# Patient Record
Sex: MALE | Race: WHITE | Employment: OTHER | URBAN - METROPOLITAN AREA
[De-identification: names, ages, dates, MRNs, and addresses within clinical notes are randomized per-mention and may not be internally consistent; named-entity substitution may affect disease eponyms.]

---

## 2017-02-07 ENCOUNTER — OFFICE VISIT (OUTPATIENT)
Dept: AUDIOLOGY | Facility: CLINIC | Age: 67
End: 2017-02-07

## 2017-02-07 DIAGNOSIS — H90.3 SENSORINEURAL HEARING LOSS, BILATERAL: Primary | ICD-10-CM

## 2017-02-07 PROCEDURE — 92593 HEARING AID CHECK, BOTH EARS: CPT | Performed by: AUDIOLOGIST

## 2017-02-07 NOTE — PROGRESS NOTES
HEARING AID FOLLOW-UP    Stu Vasquez  1/9/1950  [id number]        Hearing Aid Information    Hearing Aid Information       Right    Left   Make: Phonak Make: Phonak   Model: Fabiana V70P Model:  Fabiana V 70 P   Serial Number: [id number] Serial Number:

## 2017-05-03 ENCOUNTER — OFFICE VISIT (OUTPATIENT)
Dept: INTERNAL MEDICINE CLINIC | Facility: CLINIC | Age: 67
End: 2017-05-03

## 2017-05-03 VITALS
WEIGHT: 170 LBS | DIASTOLIC BLOOD PRESSURE: 88 MMHG | BODY MASS INDEX: 24 KG/M2 | HEART RATE: 63 BPM | TEMPERATURE: 98 F | SYSTOLIC BLOOD PRESSURE: 138 MMHG | OXYGEN SATURATION: 97 %

## 2017-05-03 DIAGNOSIS — E55.9 VITAMIN D DEFICIENCY: ICD-10-CM

## 2017-05-03 DIAGNOSIS — Z00.00 PHYSICAL EXAM: Primary | ICD-10-CM

## 2017-05-03 DIAGNOSIS — I10 ESSENTIAL HYPERTENSION: ICD-10-CM

## 2017-05-03 PROCEDURE — 85025 COMPLETE CBC W/AUTO DIFF WBC: CPT | Performed by: FAMILY MEDICINE

## 2017-05-03 PROCEDURE — G0438 PPPS, INITIAL VISIT: HCPCS | Performed by: FAMILY MEDICINE

## 2017-05-03 PROCEDURE — 84443 ASSAY THYROID STIM HORMONE: CPT | Performed by: FAMILY MEDICINE

## 2017-05-03 PROCEDURE — 82306 VITAMIN D 25 HYDROXY: CPT | Performed by: FAMILY MEDICINE

## 2017-05-03 PROCEDURE — 80053 COMPREHEN METABOLIC PANEL: CPT | Performed by: FAMILY MEDICINE

## 2017-05-03 PROCEDURE — 80061 LIPID PANEL: CPT | Performed by: FAMILY MEDICINE

## 2017-05-03 PROCEDURE — 82607 VITAMIN B-12: CPT | Performed by: FAMILY MEDICINE

## 2017-05-03 PROCEDURE — G0009 ADMIN PNEUMOCOCCAL VACCINE: HCPCS | Performed by: FAMILY MEDICINE

## 2017-05-03 PROCEDURE — 90670 PCV13 VACCINE IM: CPT | Performed by: FAMILY MEDICINE

## 2017-05-03 RX ORDER — LISINOPRIL 40 MG/1
40 TABLET ORAL DAILY
Qty: 90 TABLET | Refills: 2 | Status: SHIPPED | OUTPATIENT
Start: 2017-05-03 | End: 2017-12-05

## 2017-05-03 RX ORDER — CHLORTHALIDONE 25 MG/1
12.5 TABLET ORAL DAILY
Qty: 90 TABLET | Refills: 2 | Status: SHIPPED | OUTPATIENT
Start: 2017-05-03 | End: 2017-12-05

## 2017-05-03 NOTE — PROGRESS NOTES
HPI:   Gage Serrano is a 79year old male who presents for a MA Supervisit.     Past pcp was Dr Deisy Petres    Patient Active Problem List:     BPH (benign prostatic hyperplasia)     Nocturia     Colon cancer screening     Skin cancer    HTN- well contro you have 3 or more medical conditions?: 0-No    Have you fallen in the last 12 months?: 0-No    Do you accidently lose urine?: 0-No    Do you have difficulty seeing?: 0-No    Do you have any difficulty walking or getting up?: 0-No    Do you have any trippi UNITS Oral Tab Take  by mouth. Disp:  Rfl:    Vitamin B-12 (VITAMIN B12) 1000 MCG Oral Tab Take  by mouth. Disp:  Rfl:    lisinopril (PRINIVIL,ZESTRIL) 40 MG Oral Tab Take  by mouth.  Disp:  Rfl:    Magnesium Citrate Does not apply Powder MAGNESIUM CITRATE developed, well nourished, in no apparent distress  SKIN: no rashes, no suspicious lesions  HEENT: atraumatic, normocephalic, ears and throat are clear  Hearing assessed via: Questionnaire   Has hearing aids- Dr Deborah Soto  EYES: PERRLA, EOMI, conjunctiva are c Occult Blood Annually No results found for: FOB No flowsheet data found.     Glaucoma Screening      Ophthalmology Visit Annually Pt goes    Prostate Cancer Screening      PSA  Annually PSA due on 12/08/2018  Update Health Maintenance if applicable   Immuni Panel [E]; Future  - TSH W Reflex To Free T4 [E]; Future  - Vitamin D, 25-Hydroxy [E]; Future  - Vitamin B12 [E];  Future  - CBC W Differential W Platelet [E]  - Comp Metabolic Panel (14) [E]  - Lipid Panel [E]  - TSH W Reflex To Free T4 [E]  - Vitamin D, 2

## 2017-05-15 ENCOUNTER — NURSE ONLY (OUTPATIENT)
Dept: INTERNAL MEDICINE CLINIC | Facility: CLINIC | Age: 67
End: 2017-05-15

## 2017-05-15 DIAGNOSIS — Z12.5 PROSTATE CANCER SCREENING: ICD-10-CM

## 2017-05-15 DIAGNOSIS — E87.6 HYPOKALEMIA: ICD-10-CM

## 2017-05-15 DIAGNOSIS — R35.1 NOCTURIA: ICD-10-CM

## 2017-05-15 LAB
BILIRUB UR QL: NEGATIVE
CLARITY UR: CLEAR
COLOR UR: YELLOW
GLUCOSE UR-MCNC: NEGATIVE MG/DL
HGB UR QL STRIP.AUTO: NEGATIVE
KETONES UR-MCNC: NEGATIVE MG/DL
LEUKOCYTE ESTERASE UR QL STRIP.AUTO: NEGATIVE
NITRITE UR QL STRIP.AUTO: NEGATIVE
PH UR: 7 [PH] (ref 5–8)
POTASSIUM SERPL-SCNC: 3.7 MMOL/L (ref 3.3–5.1)
PROT UR-MCNC: NEGATIVE MG/DL
PSA SERPL-MCNC: 2.9 NG/ML (ref 0–4)
SP GR UR STRIP: 1.01 (ref 1–1.03)
UROBILINOGEN UR STRIP-ACNC: <2
VIT C UR-MCNC: NEGATIVE MG/DL

## 2017-05-15 PROCEDURE — 84132 ASSAY OF SERUM POTASSIUM: CPT | Performed by: FAMILY MEDICINE

## 2017-05-15 PROCEDURE — 81003 URINALYSIS AUTO W/O SCOPE: CPT | Performed by: UROLOGY

## 2017-05-17 ENCOUNTER — LAB REQUISITION (OUTPATIENT)
Dept: LAB | Facility: HOSPITAL | Age: 67
End: 2017-05-17
Payer: MEDICARE

## 2017-05-17 DIAGNOSIS — D48.5 NEOPLASM OF UNCERTAIN BEHAVIOR OF SKIN: ICD-10-CM

## 2017-05-17 DIAGNOSIS — R23.9 SKIN CHANGE: ICD-10-CM

## 2017-05-17 PROCEDURE — 88305 TISSUE EXAM BY PATHOLOGIST: CPT | Performed by: PLASTIC SURGERY

## 2017-11-02 ENCOUNTER — OFFICE VISIT (OUTPATIENT)
Dept: AUDIOLOGY | Facility: CLINIC | Age: 67
End: 2017-11-02

## 2017-11-02 DIAGNOSIS — H90.3 SENSORINEURAL HEARING LOSS, BILATERAL: Primary | ICD-10-CM

## 2017-11-02 PROCEDURE — 92593 HEARING AID CHECK, BOTH EARS: CPT | Performed by: AUDIOLOGIST

## 2017-11-02 PROCEDURE — V5266 BATTERY FOR HEARING DEVICE: HCPCS | Performed by: AUDIOLOGIST

## 2017-11-02 NOTE — PROGRESS NOTES
HEARING AID FOLLOW-UP    Bard Norton  1/9/1950  [id number]    Patient is here for annual hearing aid check.     He wears [employer]ista V70P hearing aids coupled to a slim tube and dome on the left and coupled to a custom earmold (helix lock) on the

## 2017-12-05 ENCOUNTER — OFFICE VISIT (OUTPATIENT)
Dept: INTERNAL MEDICINE CLINIC | Facility: CLINIC | Age: 67
End: 2017-12-05

## 2017-12-05 VITALS
HEART RATE: 80 BPM | SYSTOLIC BLOOD PRESSURE: 138 MMHG | DIASTOLIC BLOOD PRESSURE: 72 MMHG | TEMPERATURE: 98 F | BODY MASS INDEX: 24.21 KG/M2 | OXYGEN SATURATION: 98 % | WEIGHT: 171 LBS | HEIGHT: 70.5 IN

## 2017-12-05 DIAGNOSIS — Z00.00 HEALTHCARE MAINTENANCE: Primary | ICD-10-CM

## 2017-12-05 DIAGNOSIS — I10 ESSENTIAL HYPERTENSION: ICD-10-CM

## 2017-12-05 DIAGNOSIS — Z87.891 FORMER SMOKER: ICD-10-CM

## 2017-12-05 DIAGNOSIS — G89.29 CHRONIC BILATERAL LOW BACK PAIN WITHOUT SCIATICA: ICD-10-CM

## 2017-12-05 DIAGNOSIS — M54.50 CHRONIC BILATERAL LOW BACK PAIN WITHOUT SCIATICA: ICD-10-CM

## 2017-12-05 PROCEDURE — 99213 OFFICE O/P EST LOW 20 MIN: CPT | Performed by: FAMILY MEDICINE

## 2017-12-05 RX ORDER — KETOCONAZOLE 20 MG/ML
SHAMPOO TOPICAL
Refills: 4 | COMMUNITY
Start: 2017-11-06 | End: 2021-10-25

## 2017-12-05 RX ORDER — CHLORTHALIDONE 25 MG/1
12.5 TABLET ORAL DAILY
Qty: 90 TABLET | Refills: 2 | Status: SHIPPED | OUTPATIENT
Start: 2017-12-05 | End: 2018-12-18

## 2017-12-05 RX ORDER — LISINOPRIL 40 MG/1
40 TABLET ORAL DAILY
Qty: 90 TABLET | Refills: 2 | Status: SHIPPED | OUTPATIENT
Start: 2017-12-05 | End: 2018-12-26

## 2017-12-05 NOTE — PROGRESS NOTES
HPI:    Patient ID: Bulmaro Matthews is a 79year old male her for HTN f/u. HPI:  -doing well since the last visit here.   -compliant with hypertensive medication. No side effects of medication.   -does not monitor home BP  -No chest pain. No dyspnea. Omega-3 Fatty Acids (FISH OIL) 500 MG Oral Cap Take by mouth. Disp:  Rfl:    Glucosamine-Chondroitin 500-400 MG Oral Cap Take by mouth. Disp:  Rfl:    Cinnamon 500 MG Oral Tab Take by mouth.  Disp:  Rfl:    Lactobacillus (PROBIOTIC ACIDOPHILUS OR) Take by bilateral low back pain without sciatica  -gradually self-improving  -no signs of bone/disc etiology  -advised to restart home PT exercises which help significantly in the past  -NSAIDs 400-600mg BID x7d  -apply a heating pad to affected area to help relie

## 2017-12-20 ENCOUNTER — TELEPHONE (OUTPATIENT)
Dept: SURGERY | Facility: CLINIC | Age: 67
End: 2017-12-20

## 2017-12-20 ENCOUNTER — APPOINTMENT (OUTPATIENT)
Dept: LAB | Facility: HOSPITAL | Age: 67
End: 2017-12-20
Attending: UROLOGY
Payer: MEDICARE

## 2017-12-20 DIAGNOSIS — R35.1 NOCTURIA: ICD-10-CM

## 2017-12-20 DIAGNOSIS — Z12.5 SPECIAL SCREENING FOR MALIGNANT NEOPLASM OF PROSTATE: ICD-10-CM

## 2017-12-20 DIAGNOSIS — Z12.5 SPECIAL SCREENING FOR MALIGNANT NEOPLASM OF PROSTATE: Primary | ICD-10-CM

## 2017-12-20 PROCEDURE — 81003 URINALYSIS AUTO W/O SCOPE: CPT

## 2017-12-20 PROCEDURE — 36415 COLL VENOUS BLD VENIPUNCTURE: CPT

## 2017-12-20 NOTE — TELEPHONE ENCOUNTER
Spoke with Nia Hall who called asking for orders to be placed for pt. I reviewed RIVERA's LOV note of 12/21/16 and his AVS gives instructions for pt to return in 1 yr and complete a PSA screen and a UA. I told Nia Hall that I will place those orders now.

## 2017-12-22 ENCOUNTER — OFFICE VISIT (OUTPATIENT)
Dept: SURGERY | Facility: CLINIC | Age: 67
End: 2017-12-22

## 2017-12-22 VITALS
HEIGHT: 70.5 IN | DIASTOLIC BLOOD PRESSURE: 82 MMHG | HEART RATE: 72 BPM | WEIGHT: 170 LBS | TEMPERATURE: 98 F | BODY MASS INDEX: 24.07 KG/M2 | SYSTOLIC BLOOD PRESSURE: 120 MMHG | RESPIRATION RATE: 16 BRPM

## 2017-12-22 DIAGNOSIS — Z12.5 PROSTATE CANCER SCREENING: ICD-10-CM

## 2017-12-22 DIAGNOSIS — N40.1 BENIGN NON-NODULAR PROSTATIC HYPERPLASIA WITH LOWER URINARY TRACT SYMPTOMS: Primary | ICD-10-CM

## 2017-12-22 DIAGNOSIS — R35.1 NOCTURIA: ICD-10-CM

## 2017-12-22 PROCEDURE — G0463 HOSPITAL OUTPT CLINIC VISIT: HCPCS | Performed by: UROLOGY

## 2017-12-22 PROCEDURE — 99214 OFFICE O/P EST MOD 30 MIN: CPT | Performed by: UROLOGY

## 2017-12-22 NOTE — PATIENT INSTRUCTIONS
1.    In 3 months. Please notify us if urination problem were to noticeably worsen before then--let nurses know under such circumstances. BPH (Enlarged Prostate)  The prostate is a gland at the base of the bladder.  As some men get older, the prostate may screening  BPH does not increase the risk of prostate cancer. But because prostate cancer is a common cancer in men, screening is sometimes recommended. This may help detect the cancer in its early stages when treatment is most effective.  Factors that can

## 2017-12-22 NOTE — PROGRESS NOTES
HPI:    Patient ID: Johnna Chavarria is a 79year old male. HPI     1.  Voiding Dysfunction  Patient has current AUA score of 14, moderate voiding dysfunction category, worse compared to previous score of 10, moderate voiding dysfunction category, on 12 change. Neurological: Negative for speech difficulty. Psychiatric/Behavioral: The patient is not nervous/anxious.         HISTORY:  Past Medical History:   Diagnosis Date   • Benign prostatic hypertrophy    • Diverticulosis of large intestine    • Lump Allergies:  Merthiolate Glyceri*    Rash   PHYSICAL EXAM:   Physical Exam   Constitutional: He is oriented to person, place, and time. He appears well-developed and well-nourished. No distress. HENT:   Head: Normocephalic and atraumatic.    Eyes: EOM undecided as to when he would like to have iot performed (because of family situation) and would like to follow up in 3 months for re-evaluation.    (R35.1) Nocturia  Patient has current AUA score of 14, moderate voiding dysfunction category.  Patient feels

## 2018-01-03 ENCOUNTER — AUDIOLOGY DOCUMENTATION (OUTPATIENT)
Dept: AUDIOLOGY | Facility: CLINIC | Age: 68
End: 2018-01-03

## 2018-01-03 NOTE — TELEPHONE ENCOUNTER
Patient presented at       Hearing Aid Information       Right    Left   Make: Phonak Make: Phonak   Model: Bolero V70P Model:  Bolero V 70 P   Serial Number: [id number] Serial Number: [id number]   Date of Purchase: 10/04/16 Date of Purchase: 10/04/

## 2018-01-17 ENCOUNTER — HOSPITAL ENCOUNTER (OUTPATIENT)
Dept: ULTRASOUND IMAGING | Facility: HOSPITAL | Age: 68
Discharge: HOME | End: 2018-01-17
Attending: FAMILY MEDICINE
Payer: MEDICARE

## 2018-01-17 DIAGNOSIS — Z87.891 FORMER SMOKER: ICD-10-CM

## 2018-01-17 PROCEDURE — 76706 US ABDL AORTA SCREEN AAA: CPT | Performed by: FAMILY MEDICINE

## 2018-02-09 ENCOUNTER — TELEPHONE (OUTPATIENT)
Dept: SURGERY | Facility: CLINIC | Age: 68
End: 2018-02-09

## 2018-02-09 NOTE — TELEPHONE ENCOUNTER
YUK gave written instructions to move this pt to 8am on tues 2/13. I called pt and asked if he could come in at 8am and he agreed and I changed the appt on the schd.

## 2018-02-13 ENCOUNTER — OFFICE VISIT (OUTPATIENT)
Dept: SURGERY | Facility: CLINIC | Age: 68
End: 2018-02-13

## 2018-02-13 VITALS
HEART RATE: 61 BPM | BODY MASS INDEX: 23.62 KG/M2 | SYSTOLIC BLOOD PRESSURE: 136 MMHG | WEIGHT: 165 LBS | HEIGHT: 70 IN | TEMPERATURE: 98 F | RESPIRATION RATE: 16 BRPM | DIASTOLIC BLOOD PRESSURE: 80 MMHG

## 2018-02-13 DIAGNOSIS — R35.1 NOCTURIA: ICD-10-CM

## 2018-02-13 DIAGNOSIS — N40.1 BENIGN NON-NODULAR PROSTATIC HYPERPLASIA WITH LOWER URINARY TRACT SYMPTOMS: Primary | ICD-10-CM

## 2018-02-13 DIAGNOSIS — Z12.5 PROSTATE CANCER SCREENING: ICD-10-CM

## 2018-02-13 PROCEDURE — G0463 HOSPITAL OUTPT CLINIC VISIT: HCPCS | Performed by: UROLOGY

## 2018-02-13 PROCEDURE — 99213 OFFICE O/P EST LOW 20 MIN: CPT | Performed by: UROLOGY

## 2018-02-13 RX ORDER — DIAZEPAM 10 MG/1
TABLET ORAL
Qty: 1 TABLET | Refills: 0 | Status: SHIPPED | OUTPATIENT
Start: 2018-02-13 | End: 2018-03-27 | Stop reason: ALTCHOICE

## 2018-02-13 NOTE — PROGRESS NOTES
HPI:    Patient ID: Pj Boyd is a 76year old male. HPI    1.  Voiding Dysfunction  Patient has current AUA score of 11, moderate voiding dysfunction category, better compared to previous score of 14, moderate voiding dysfunction category, on 12 Gastrointestinal: Negative for abdominal pain and constipation. Genitourinary: Negative for dysuria, flank pain and hematuria (gross).         Patient complains of sensation of not emptying bladder, urinary frequency less then 2 hours, intermittent stre Take by mouth. Disp:  Rfl:    Lactobacillus (PROBIOTIC ACIDOPHILUS OR) Take by mouth. Disp:  Rfl:    aspirin (ASPIR-81) 81 MG Oral Tab EC Take  by mouth. Disp:  Rfl:    Cholecalciferol (D 5000) 5000 UNITS Oral Tab Take  by mouth.  Disp:  Rfl:    Vitamin B-1 hyperplasia with lower urinary tract symptoms  (primary encounter diagnosis)  Prostate exam not performed today, however on 12/22/2017 per chart review, Prostate 50 g; 4+ enlarged, base able to be palpated, no palpable nodules or indurations.  I fully expla anatomy of the prostate and also of the bladder in preparation for likely greenlight laser ablation of the prostate. 3.  Please stop fish oil and baby aspirin (81 mg) at least 7 days before procedure    4.   Diazepam/Valium 10 mg tablet upon arrival at o

## 2018-02-13 NOTE — PATIENT INSTRUCTIONS
1.  Transrectal ultrasound of the prostate--no biopsy; my nurses will help/advise you in scheduling and at the hospital; purposes to measure more precisely volume of the enlarged prostate.       2.  Cystoscopy, possible biopsy--primary is to investigate int

## 2018-02-16 ENCOUNTER — TELEPHONE (OUTPATIENT)
Dept: SURGERY | Facility: CLINIC | Age: 68
End: 2018-02-16

## 2018-02-21 ENCOUNTER — HOSPITAL ENCOUNTER (OUTPATIENT)
Dept: ULTRASOUND IMAGING | Facility: HOSPITAL | Age: 68
Discharge: HOME | End: 2018-02-21
Attending: UROLOGY
Payer: MEDICARE

## 2018-02-21 DIAGNOSIS — R35.1 NOCTURIA: ICD-10-CM

## 2018-02-21 DIAGNOSIS — N40.1 BENIGN NON-NODULAR PROSTATIC HYPERPLASIA WITH LOWER URINARY TRACT SYMPTOMS: ICD-10-CM

## 2018-02-21 PROCEDURE — 76872 US TRANSRECTAL: CPT | Performed by: UROLOGY

## 2018-03-05 NOTE — TELEPHONE ENCOUNTER
Referral not needed Outpatient scheduling was calling to have the DX to be changed to get test covered by insurance.  Printed order and given to [employer]

## 2018-03-19 ENCOUNTER — OFFICE VISIT (OUTPATIENT)
Dept: SURGERY | Facility: CLINIC | Age: 68
End: 2018-03-19

## 2018-03-19 VITALS
DIASTOLIC BLOOD PRESSURE: 78 MMHG | TEMPERATURE: 98 F | WEIGHT: 165 LBS | HEART RATE: 72 BPM | RESPIRATION RATE: 16 BRPM | HEIGHT: 70.5 IN | BODY MASS INDEX: 23.36 KG/M2 | SYSTOLIC BLOOD PRESSURE: 122 MMHG

## 2018-03-19 DIAGNOSIS — R35.1 NOCTURIA: ICD-10-CM

## 2018-03-19 DIAGNOSIS — N40.1 BENIGN NON-NODULAR PROSTATIC HYPERPLASIA WITH LOWER URINARY TRACT SYMPTOMS: Primary | ICD-10-CM

## 2018-03-19 DIAGNOSIS — N21.0 BLADDER STONES: ICD-10-CM

## 2018-03-19 PROCEDURE — 99213 OFFICE O/P EST LOW 20 MIN: CPT | Performed by: UROLOGY

## 2018-03-19 PROCEDURE — 52000 CYSTOURETHROSCOPY: CPT | Performed by: UROLOGY

## 2018-03-19 NOTE — PROGRESS NOTES
PREOPERATIVE DIAGNOSIS:     BPH     POSTOP DIAGNOSIS:               The same    PROCEDURE:              Cystoscopy              ANESTHESIA:     2% Xylocaine jelly local; Diazepam/Valium 10 mg;  also see above    FINDINGS:  Strictures of  the urethra:   Min Patient has current AUA score of 11, moderate voiding dysfunction category, better compared to previous score of 14, moderate voiding dysfunction category, on 12/22/2017 per chart review.  Patient complains of sensation of not emptying bladder, urinary freq 12/20/2017 PSA = 3.1; UA = Microscopic not indicated   05/15/2017 PSA = 2.9; UA = Microscopic not indicated   12/8/2016 PSA = 2.6; UA RBC < 1  12/17/2015 PSA = 2.8  12/8/2014 PSA = 2.5  12/5/2013 PSA = 2.3    Imaging  02/21/2018 US Prostate = 6.74 x 4.69 x Prostate exam not performed today, however on 12/22/2017 per chart review, Prostate 50 g; 4+ enlarged, base able to be palpated, no palpable nodules or indurations.  On today's cystoscopy the length of the prostatic urethra was 3.5 cm; the intravesical medi 1.   If your urine is bloody, please drink enough liquids to dilute out the blood. If the urine doesn't show any signs of blood, you can drink your usual amount of liquids.  Once there are no signs of blood, you can likely restart daily aspirin or other ant

## 2018-03-19 NOTE — PATIENT INSTRUCTIONS
1.   If your urine is bloody, please drink enough liquids to dilute out the blood. If the urine doesn't show any signs of blood, you can drink your usual amount of liquids.  Once there are no signs of blood, you can likely restart daily aspirin or other ant If symptoms are mild, no treatment may be needed right now. If symptoms are more severe, treatment is likely needed. The goal of treatment is to improve urine flow and reduce symptoms. Treatments can include medicine and procedures.  Your healthcare provide · Increasing pressure or pain in your bladder (lower abdomen)  · Blood in the urine  · Increasing low back pain, not related to injury  · Symptoms of urinary infection (increased urge to urinate, burning when passing urine, foul-smelling urine)  Date Last · Cystoscopy. This test uses a flexible tube with a camera (called a scope). The scope is passed up the urethra to look inside your urinary tract. · Urine flow study. This test uses a special device to see how fast urine leaves your body.   · Prostate ultr BPH and prostate cancer share some symptoms. That’s why it’s important to talk with your provider about your symptoms. Men with BPH aren’t more likely to develop this cancer. But they may have higher levels of the prostate-specific antigen (PSA).  A higher

## 2018-03-20 ENCOUNTER — TELEPHONE (OUTPATIENT)
Dept: SURGERY | Facility: CLINIC | Age: 68
End: 2018-03-20

## 2018-03-20 NOTE — TELEPHONE ENCOUNTER
Patient seen in office, scheduled for cysto, green light laser, Tuesday 04/03/18 @ 9:30, [facility]/outpatient, went over labs/pre-op with patient verbalized understanding

## 2018-03-26 ENCOUNTER — OFFICE VISIT (OUTPATIENT)
Dept: INTERNAL MEDICINE CLINIC | Facility: CLINIC | Age: 68
End: 2018-03-26

## 2018-03-26 VITALS
HEART RATE: 74 BPM | DIASTOLIC BLOOD PRESSURE: 84 MMHG | SYSTOLIC BLOOD PRESSURE: 136 MMHG | HEIGHT: 70.5 IN | TEMPERATURE: 98 F | OXYGEN SATURATION: 98 % | WEIGHT: 170 LBS | BODY MASS INDEX: 24.07 KG/M2

## 2018-03-26 DIAGNOSIS — H60.501 ACUTE OTITIS EXTERNA OF RIGHT EAR, UNSPECIFIED TYPE: Primary | ICD-10-CM

## 2018-03-26 PROCEDURE — 99213 OFFICE O/P EST LOW 20 MIN: CPT | Performed by: FAMILY MEDICINE

## 2018-03-26 RX ORDER — NEOMYCIN SULFATE, POLYMYXIN B SULFATE AND HYDROCORTISONE 10; 3.5; 1 MG/ML; MG/ML; [USP'U]/ML
SUSPENSION/ DROPS AURICULAR (OTIC)
Qty: 1 BOTTLE | Refills: 0 | Status: SHIPPED | OUTPATIENT
Start: 2018-03-26 | End: 2018-04-18 | Stop reason: ALTCHOICE

## 2018-03-26 NOTE — PATIENT INSTRUCTIONS
External Ear Infection (Adult)    External otitis (also called “swimmer’s ear”) is an infection in the ear canal. It is often caused by bacteria or fungus. It can occur a few days after water gets trapped in the ear canal (from swimming or bathing).  It c Call your healthcare provider right away if any of these occur:  · Ear pain becomes worse or doesn’t improve after 3 days of treatment  · Redness or swelling of the outer ear occurs or gets worse  · Headache  · Painful or stiff neck  · Drowsiness or confus

## 2018-03-26 NOTE — PROGRESS NOTES
HPI:     Mumtaz Sahni is a 76year old male who presents with a chief complaint of right ear pain. Onset of symptoms was gradual starting 1 week ago.    Fevers:no  History of frequent ear infections: no    R ear pain   Had a little URI first  No fev right ear  Dispense: 1 Bottle; Refill: 0      There are no diagnoses linked to this encounter. Pt to call if any questions or concerns.   Follow up if not improving

## 2018-04-03 ENCOUNTER — ANESTHESIA EVENT (OUTPATIENT)
Dept: SURGERY | Facility: HOSPITAL | Age: 68
End: 2018-04-03
Payer: MEDICARE

## 2018-04-03 ENCOUNTER — ANESTHESIA (OUTPATIENT)
Dept: SURGERY | Facility: HOSPITAL | Age: 68
End: 2018-04-03
Payer: MEDICARE

## 2018-04-03 ENCOUNTER — SURGERY (OUTPATIENT)
Age: 68
End: 2018-04-03

## 2018-04-03 ENCOUNTER — HOSPITAL ENCOUNTER (OUTPATIENT)
Facility: HOSPITAL | Age: 68
Setting detail: HOSPITAL OUTPATIENT SURGERY
Discharge: HOME | End: 2018-04-03
Attending: UROLOGY | Admitting: UROLOGY
Payer: MEDICARE

## 2018-04-03 ENCOUNTER — TELEPHONE (OUTPATIENT)
Dept: SURGERY | Facility: CLINIC | Age: 68
End: 2018-04-03

## 2018-04-03 VITALS
OXYGEN SATURATION: 100 % | TEMPERATURE: 98 F | HEART RATE: 64 BPM | HEIGHT: 70.5 IN | BODY MASS INDEX: 23.36 KG/M2 | DIASTOLIC BLOOD PRESSURE: 70 MMHG | WEIGHT: 165 LBS | RESPIRATION RATE: 12 BRPM | SYSTOLIC BLOOD PRESSURE: 124 MMHG

## 2018-04-03 DIAGNOSIS — N35.912 BULBOUS URETHRAL STRICTURE: ICD-10-CM

## 2018-04-03 DIAGNOSIS — N40.1 BENIGN NON-NODULAR PROSTATIC HYPERPLASIA WITH LOWER URINARY TRACT SYMPTOMS: Primary | ICD-10-CM

## 2018-04-03 PROCEDURE — 0V508ZZ DESTRUCTION OF PROSTATE, VIA NATURAL OR ARTIFICIAL OPENING ENDOSCOPIC: ICD-10-PCS | Performed by: UROLOGY

## 2018-04-03 PROCEDURE — 52648 LASER SURGERY OF PROSTATE: CPT | Performed by: UROLOGY

## 2018-04-03 PROCEDURE — 0T7D8ZZ DILATION OF URETHRA, VIA NATURAL OR ARTIFICIAL OPENING ENDOSCOPIC: ICD-10-PCS | Performed by: UROLOGY

## 2018-04-03 RX ORDER — HALOPERIDOL 5 MG/ML
0.25 INJECTION INTRAMUSCULAR ONCE AS NEEDED
Status: DISCONTINUED | OUTPATIENT
Start: 2018-04-03 | End: 2018-04-03

## 2018-04-03 RX ORDER — NALOXONE HYDROCHLORIDE 0.4 MG/ML
80 INJECTION, SOLUTION INTRAMUSCULAR; INTRAVENOUS; SUBCUTANEOUS AS NEEDED
Status: DISCONTINUED | OUTPATIENT
Start: 2018-04-03 | End: 2018-04-03

## 2018-04-03 RX ORDER — HYDROMORPHONE HYDROCHLORIDE 1 MG/ML
0.4 INJECTION, SOLUTION INTRAMUSCULAR; INTRAVENOUS; SUBCUTANEOUS EVERY 5 MIN PRN
Status: DISCONTINUED | OUTPATIENT
Start: 2018-04-03 | End: 2018-04-03

## 2018-04-03 RX ORDER — CIPROFLOXACIN 500 MG/1
500 TABLET, FILM COATED ORAL 2 TIMES DAILY
Qty: 10 TABLET | Refills: 0 | Status: SHIPPED | OUTPATIENT
Start: 2018-04-03 | End: 2018-04-08

## 2018-04-03 RX ORDER — ACETAMINOPHEN 500 MG
1000 TABLET ORAL ONCE
Status: COMPLETED | OUTPATIENT
Start: 2018-04-03 | End: 2018-04-03

## 2018-04-03 RX ORDER — HYDROMORPHONE HYDROCHLORIDE 1 MG/ML
0.2 INJECTION, SOLUTION INTRAMUSCULAR; INTRAVENOUS; SUBCUTANEOUS EVERY 5 MIN PRN
Status: DISCONTINUED | OUTPATIENT
Start: 2018-04-03 | End: 2018-04-03

## 2018-04-03 RX ORDER — MORPHINE SULFATE 2 MG/ML
2 INJECTION, SOLUTION INTRAMUSCULAR; INTRAVENOUS EVERY 10 MIN PRN
Status: DISCONTINUED | OUTPATIENT
Start: 2018-04-03 | End: 2018-04-03

## 2018-04-03 RX ORDER — DEXAMETHASONE SODIUM PHOSPHATE 4 MG/ML
VIAL (ML) INJECTION AS NEEDED
Status: DISCONTINUED | OUTPATIENT
Start: 2018-04-03 | End: 2018-04-03 | Stop reason: SURG

## 2018-04-03 RX ORDER — SODIUM CHLORIDE, SODIUM LACTATE, POTASSIUM CHLORIDE, CALCIUM CHLORIDE 600; 310; 30; 20 MG/100ML; MG/100ML; MG/100ML; MG/100ML
INJECTION, SOLUTION INTRAVENOUS CONTINUOUS
Status: DISCONTINUED | OUTPATIENT
Start: 2018-04-03 | End: 2018-04-03

## 2018-04-03 RX ORDER — LIDOCAINE HYDROCHLORIDE 20 MG/ML
JELLY TOPICAL AS NEEDED
Status: DISCONTINUED | OUTPATIENT
Start: 2018-04-03 | End: 2018-04-03 | Stop reason: HOSPADM

## 2018-04-03 RX ORDER — FAMOTIDINE 20 MG/1
20 TABLET ORAL ONCE
Status: DISCONTINUED | OUTPATIENT
Start: 2018-04-03 | End: 2018-04-03 | Stop reason: HOSPADM

## 2018-04-03 RX ORDER — MIDAZOLAM HYDROCHLORIDE 1 MG/ML
INJECTION INTRAMUSCULAR; INTRAVENOUS AS NEEDED
Status: DISCONTINUED | OUTPATIENT
Start: 2018-04-03 | End: 2018-04-03 | Stop reason: SURG

## 2018-04-03 RX ORDER — CIPROFLOXACIN 500 MG/1
500 TABLET, FILM COATED ORAL 2 TIMES DAILY
Qty: 10 TABLET | Refills: 0 | Status: ON HOLD | OUTPATIENT
Start: 2018-04-03 | End: 2018-04-08

## 2018-04-03 RX ORDER — ONDANSETRON 2 MG/ML
INJECTION INTRAMUSCULAR; INTRAVENOUS AS NEEDED
Status: DISCONTINUED | OUTPATIENT
Start: 2018-04-03 | End: 2018-04-03 | Stop reason: SURG

## 2018-04-03 RX ORDER — HYDROCODONE BITARTRATE AND ACETAMINOPHEN 5; 325 MG/1; MG/1
1 TABLET ORAL AS NEEDED
Status: DISCONTINUED | OUTPATIENT
Start: 2018-04-03 | End: 2018-04-03

## 2018-04-03 RX ORDER — HYDROCODONE BITARTRATE AND ACETAMINOPHEN 5; 325 MG/1; MG/1
2 TABLET ORAL AS NEEDED
Status: DISCONTINUED | OUTPATIENT
Start: 2018-04-03 | End: 2018-04-03

## 2018-04-03 RX ORDER — MORPHINE SULFATE 4 MG/ML
4 INJECTION, SOLUTION INTRAMUSCULAR; INTRAVENOUS EVERY 10 MIN PRN
Status: DISCONTINUED | OUTPATIENT
Start: 2018-04-03 | End: 2018-04-03

## 2018-04-03 RX ORDER — LIDOCAINE HYDROCHLORIDE 10 MG/ML
INJECTION, SOLUTION EPIDURAL; INFILTRATION; INTRACAUDAL; PERINEURAL AS NEEDED
Status: DISCONTINUED | OUTPATIENT
Start: 2018-04-03 | End: 2018-04-03 | Stop reason: SURG

## 2018-04-03 RX ORDER — MORPHINE SULFATE 10 MG/ML
6 INJECTION, SOLUTION INTRAMUSCULAR; INTRAVENOUS EVERY 10 MIN PRN
Status: DISCONTINUED | OUTPATIENT
Start: 2018-04-03 | End: 2018-04-03

## 2018-04-03 RX ORDER — LEVOFLOXACIN 500 MG/1
500 TABLET, FILM COATED ORAL ONCE
Status: COMPLETED | OUTPATIENT
Start: 2018-04-03 | End: 2018-04-03

## 2018-04-03 RX ORDER — HYDROCODONE BITARTRATE AND ACETAMINOPHEN 5; 325 MG/1; MG/1
1 TABLET ORAL EVERY 4 HOURS PRN
Status: CANCELLED | OUTPATIENT
Start: 2018-04-03

## 2018-04-03 RX ORDER — METOCLOPRAMIDE 10 MG/1
10 TABLET ORAL ONCE
Status: DISCONTINUED | OUTPATIENT
Start: 2018-04-03 | End: 2018-04-03 | Stop reason: HOSPADM

## 2018-04-03 RX ORDER — ACETAMINOPHEN 325 MG/1
650 TABLET ORAL EVERY 4 HOURS PRN
Status: CANCELLED | OUTPATIENT
Start: 2018-04-03

## 2018-04-03 RX ORDER — ONDANSETRON 2 MG/ML
4 INJECTION INTRAMUSCULAR; INTRAVENOUS ONCE AS NEEDED
Status: DISCONTINUED | OUTPATIENT
Start: 2018-04-03 | End: 2018-04-03

## 2018-04-03 RX ORDER — HYDROCODONE BITARTRATE AND ACETAMINOPHEN 5; 325 MG/1; MG/1
2 TABLET ORAL EVERY 4 HOURS PRN
Status: CANCELLED | OUTPATIENT
Start: 2018-04-03

## 2018-04-03 RX ORDER — HYDROMORPHONE HYDROCHLORIDE 1 MG/ML
0.6 INJECTION, SOLUTION INTRAMUSCULAR; INTRAVENOUS; SUBCUTANEOUS EVERY 5 MIN PRN
Status: DISCONTINUED | OUTPATIENT
Start: 2018-04-03 | End: 2018-04-03

## 2018-04-03 RX ORDER — HYDROCODONE BITARTRATE AND ACETAMINOPHEN 5; 325 MG/1; MG/1
1 TABLET ORAL EVERY 6 HOURS PRN
Qty: 10 TABLET | Refills: 0 | Status: SHIPPED | OUTPATIENT
Start: 2018-04-03 | End: 2018-04-13

## 2018-04-03 RX ADMIN — DEXAMETHASONE SODIUM PHOSPHATE 4 MG: 4 MG/ML VIAL (ML) INJECTION at 09:50:00

## 2018-04-03 RX ADMIN — SODIUM CHLORIDE, SODIUM LACTATE, POTASSIUM CHLORIDE, CALCIUM CHLORIDE: 600; 310; 30; 20 INJECTION, SOLUTION INTRAVENOUS at 11:53:00

## 2018-04-03 RX ADMIN — ONDANSETRON 4 MG: 2 INJECTION INTRAMUSCULAR; INTRAVENOUS at 11:44:00

## 2018-04-03 RX ADMIN — MIDAZOLAM HYDROCHLORIDE 2 MG: 1 INJECTION INTRAMUSCULAR; INTRAVENOUS at 09:36:00

## 2018-04-03 RX ADMIN — LIDOCAINE HYDROCHLORIDE 50 MG: 10 INJECTION, SOLUTION EPIDURAL; INFILTRATION; INTRACAUDAL; PERINEURAL at 09:41:00

## 2018-04-03 NOTE — TELEPHONE ENCOUNTER
Dear Dr. Haley Miner,      4/3/18 Tuesday cystoscopy with greenlight laser ablation of very large BPH; EM, same-day surgery, general anesthesia. Case went well. Very large BPH; by ultrasound, prostate 5-6 times larger than normal.    Patient went home with Robles catheter. He will call my nurses in the morning; if urine color acceptable, he will come to office to have Robles catheter removed.     Many thanks,    Klaudia Felton M.D.  Kaylee Florez )

## 2018-04-03 NOTE — H&P
Elzbieta Chavez MD   UROLOGY      []Manual[]Template  []Copied    PREOPERATIVE HISTORY AND PHYSICAL        1.  Voiding Dysfunction--Per Chart review 02/13/2018   Patient has current AUA score of 11, moderate voiding dysfunction category, better compared 12/22/2017 office visit with me; Prostate 50 g; 4+ enlarged, base able to be palpated, no palpable nodules or indurations; has stated great interest in green light but is still undecided as to when he would like to have it performed (because of family situ Other findings                 :tiny 1-2mm bladder stones; small clot; no tumors or CIS of the bladder     INDICATIONS:    02/21/2018 US Prostate = size of 110.32 ml        DESCRIPTION OF PROCEDURE :  The patient was asked to void and empty the bladder and Prostate exam not performed today, however on 12/22/2017 per chart review, Prostate 50 g; 4+ enlarged, base able to be palpated, no palpable nodules or indurations.  On today's cystoscopy the length of the prostatic urethra was 3.5 cm; the intravesical medi 1.   If your urine is bloody, please drink enough liquids to dilute out the blood. If the urine doesn't show any signs of blood, you can drink your usual amount of liquids.  Once there are no signs of blood, you can likely restart daily aspirin or other ant

## 2018-04-03 NOTE — TELEPHONE ENCOUNTER
Nurses, please call patient 8/815 a.m. in the morning; if color of urine is acceptable, have him come to the office to have Sahni catheter removed; balloon has 30 cc.    4/3/18 Tuesday cystoscopy with greenlight laser ablation of very large BPH; patient went home with Sahni catheter. The following her discharge instructions given to the patient =    1. home with leg bag during the day, and large Sahni bag at night;   switch from one bag  to the other, using clean technique, washing hands, using alcohol wipes on all connector pieces. 2. secure   leg bag to thigh in such a fashion,  so that there is plenty of slack in the sahni tube,  and so that it is not pulling on the penis and the prostate when you walk. 3. Hydrocodone narcotic 5 mg/325 mg acetaminophen 1 tablet every 4-6 hours for pain; this medication can constipate rapidly so if you take it, take some over-the-counter milk of magnesia with it, either 1 teaspoon or 1 tablespoon if urine is bloody    4. For mild or mild to medium pain, take Tylenol or acetaminophen    5. Antibiotic -----on 4/4/18, start ciprofloxacin 500 mg twice daily for 5 days. 6. push enough liquids including eating soups, stews, anything with liquids to increase urine output; if color of urine is not bloody, you can drink normal amounts of liquids. 7. Call office nurses  in the morning after 8 AM and report on color of urine; if color of urine is good, nurses will have you come in to the office to have Sahni catheter removed     8. A F T E R   the Sahni catheter is removed by nurses, you will likely have some burning pain with urination. If it is slight and relatively tolerable, observe; if it is mild, consider taking over-the-counter Tylenol. If it is mild to moderate or worse,you can take either over-the-counter \"Azo\" (phenazopyridine) 95 mg tablet 3 times a day--this can be but without a prescription.   Prescription phenazopyridine/Pyridium is available at a 200 mg dose 3 times a day but that requires a prescription but it is the same medication. These medications will turn your urine a bright orange red and will permanently stain white underwear    9. avoid aspirin or NSAIDs (medications such as Advil, Motrin, Aleve, ibuprofen) for 10 days after procedure or longer if urine is bloody or pink ; if you  were instructed by your other physicians to take a daily aspirin or some type of blood thinning medication, check with Dr. Miriam Casiano  as to when you should restart it. 10.   No heavy lifting or strenuous physical activity for 2.5--3 weeks. 11.  Continue your tamsulosin (or alfuzosin--medication extremely similar to tamsulosin) for another 10-21 days, depending on how much more you have left at home, and you do not have to take it anymore afterwards     12. Please avoid constipation, which leads to straining and forcing of bowel movements--this can cause bleeding or make it worse. To prevent this, I recommend taking over-the-counter generic sugar-free Metamucil, one large tablespoon and 10-12 ounces of cold water every morning upon awakening and also an hour before dinner; do not take at bedtime because that can lead to waking up more often at night to urinate      13. WHEN YOU SEE THE NURSE THE DAY AFTER SURGERY, PLEASE SCHEDULE OFFICE APPOINTMENT TO SEE DR. QUINTERO APPROXIMATELY 4 WEEKS AFTER THE GREENLIGHT LASER SURGERY TO REEVALUATE YOUR POSTOPERATIVE HEALING. 14.  Although your urination problem should improve every month, it can take about 6 months until your bladder readjusts to the surgery and before it becomes less overactive.     Many thanks, Dr. Jovanny Nieves

## 2018-04-03 NOTE — INTERVAL H&P NOTE
Pre-op Diagnosis: Benign non-nodular prostatic hyperplasia with lower urinary tract symptoms [N40.1]    The above referenced H&P was reviewed by Debbie Pro MD on 4/3/2018, the patient was examined and no significant changes have occurred in the rosalva

## 2018-04-03 NOTE — OPERATIVE REPORT
[facility]    PATIENT'S NAME: Aurelio Yoli   ATTENDING PHYSICIAN: Jani Rey MD   OPERATING PHYSICIAN: Jani Rey MD   PATIENT ACCOUNT#:   [id number]    LOCATION:  [facility] PACU [address] [place] [address]  MEDICAL RECORD #:   [id number] GreenLight MoXy fiber. Ablation of BPH tissue was started at the bladder neck at a power setting of 80 joules. Obstructing BPH tissue was ablated at the bladder neck circumferentially.   As I moved away from the bladder neck, the power was increased to 14

## 2018-04-03 NOTE — BRIEF OP NOTE
[facility] POST ANESTHESIA CARE UNIT  Brief Op Note       Patients Name: Mumtaz Sahni  Attending Physician: Angeli Hubbard MD  Operating Physician: Edita Smyth MD  CSN: [id number]     Location:  OR  MRN: [id number]    Date of Birth: [birth year]

## 2018-04-03 NOTE — ANESTHESIA POSTPROCEDURE EVALUATION
Patient: Pj Boyd    Procedure Summary     Date:  04/03/18 Room / Location:  [facility] OR  / [facility] OR    Anesthesia Start:  2624 Anesthesia Stop:      Procedures:       CYSTOSCOPY (N/A )      GREEN LIGHT LASER OF THE PROSTATE (N/A ) Diagnosis:

## 2018-04-03 NOTE — ANESTHESIA PREPROCEDURE EVALUATION
Anesthesia PreOp Note    HPI:     Issa Tom is a 76year old male who presents for preoperative consultation requested by: Leila Perez MD    Date of Surgery: 4/3/2018    Procedure(s):  CYSTOSCOPY  GREEN LIGHT LASER OF THE PROSTATE  Indicatio Lactobacillus (PROBIOTIC ACIDOPHILUS OR) Take by mouth daily. Disp:  Rfl:  3/24/2018   aspirin (ASPIR-81) 81 MG Oral Tab EC Take 81 mg by mouth daily.    Disp:  Rfl:  3/24/2018   Cholecalciferol (D 5000) 5000 UNITS Oral Tab Take 2 tablets by mouth daily pulse is 64.  His respiration is 18 and oxygen saturation is 97%.    03/27/18  1656 04/03/18  0758   BP:  119/73   BP Location:  Right arm   Pulse:  64   Resp:  18   Temp:  97.9 °F (36.6 °C)   TempSrc:  Oral   SpO2:  97%   Weight: 74.8 kg (165 lb) 74.8 kg (

## 2018-04-04 ENCOUNTER — NURSE ONLY (OUTPATIENT)
Dept: SURGERY | Facility: CLINIC | Age: 68
End: 2018-04-04

## 2018-04-04 ENCOUNTER — TELEPHONE (OUTPATIENT)
Dept: SURGERY | Facility: CLINIC | Age: 68
End: 2018-04-04

## 2018-04-04 DIAGNOSIS — R33.9 URINARY RETENTION: Primary | ICD-10-CM

## 2018-04-04 PROCEDURE — 51700 IRRIGATION OF BLADDER: CPT | Performed by: UROLOGY

## 2018-04-04 NOTE — PROGRESS NOTES
I called pt into the exam room and told him that I was asked to complete a voiding trial decath because he informed this morning that the urine color was a light tea color. I introduced my self and verified pt's name and .  I assisted the pt onto the exa

## 2018-04-04 NOTE — TELEPHONE ENCOUNTER
Spoke to patient. Patient states urine in sahni catheter bag is \"tea\" colored, clear, no clots. Instructed patient to present to office for Nurse Visit this morning. Patient agreed, states can come in 30 min.

## 2018-04-04 NOTE — TELEPHONE ENCOUNTER
Spoke with pt and determined that when he lifted his leg up to empty the leg bag because he cant bend over to detach the leg strap he noted that about 5-10 drops of blood came out from the end of the penis at the site where the cath enters.  States that [employer]

## 2018-04-04 NOTE — TELEPHONE ENCOUNTER
I spoke with PVK to inform of pt's concerns about the bleeding and he gave verbal orders to tell pt not to worry that the bleeding is common and expected and it may be coming from below the level of the prostate or may have been caused by a bladder spasm i

## 2018-04-04 NOTE — TELEPHONE ENCOUNTER
Pt called stating pt had surgery 4-3-18. Pt has question on recovery. Call transferred to the nurse.

## 2018-04-05 ENCOUNTER — NURSE ONLY (OUTPATIENT)
Dept: SURGERY | Facility: CLINIC | Age: 68
End: 2018-04-05

## 2018-04-05 ENCOUNTER — TELEPHONE (OUTPATIENT)
Dept: SURGERY | Facility: CLINIC | Age: 68
End: 2018-04-05

## 2018-04-05 ENCOUNTER — APPOINTMENT (OUTPATIENT)
Dept: CT IMAGING | Facility: HOSPITAL | Age: 68
DRG: 641 | End: 2018-04-05
Attending: EMERGENCY MEDICINE
Payer: MEDICARE

## 2018-04-05 ENCOUNTER — HOSPITAL ENCOUNTER (INPATIENT)
Facility: HOSPITAL | Age: 68
LOS: 3 days | Discharge: HOME | DRG: 641 | End: 2018-04-08
Attending: EMERGENCY MEDICINE | Admitting: HOSPITALIST
Payer: MEDICARE

## 2018-04-05 ENCOUNTER — APPOINTMENT (OUTPATIENT)
Dept: ULTRASOUND IMAGING | Facility: HOSPITAL | Age: 68
DRG: 641 | End: 2018-04-05
Attending: HOSPITALIST
Payer: MEDICARE

## 2018-04-05 DIAGNOSIS — D72.829 LEUKOCYTOSIS, UNSPECIFIED TYPE: ICD-10-CM

## 2018-04-05 DIAGNOSIS — R55 SYNCOPE AND COLLAPSE: ICD-10-CM

## 2018-04-05 DIAGNOSIS — E87.1 HYPONATREMIA: Primary | ICD-10-CM

## 2018-04-05 DIAGNOSIS — R33.9 URINARY RETENTION: Primary | ICD-10-CM

## 2018-04-05 PROCEDURE — G0463 HOSPITAL OUTPT CLINIC VISIT: HCPCS | Performed by: UROLOGY

## 2018-04-05 PROCEDURE — 99223 1ST HOSP IP/OBS HIGH 75: CPT | Performed by: HOSPITALIST

## 2018-04-05 PROCEDURE — 72125 CT NECK SPINE W/O DYE: CPT | Performed by: EMERGENCY MEDICINE

## 2018-04-05 PROCEDURE — 51700 IRRIGATION OF BLADDER: CPT | Performed by: UROLOGY

## 2018-04-05 PROCEDURE — 70450 CT HEAD/BRAIN W/O DYE: CPT | Performed by: EMERGENCY MEDICINE

## 2018-04-05 PROCEDURE — 93880 EXTRACRANIAL BILAT STUDY: CPT | Performed by: HOSPITALIST

## 2018-04-05 RX ORDER — ONDANSETRON 2 MG/ML
4 INJECTION INTRAMUSCULAR; INTRAVENOUS EVERY 6 HOURS PRN
Status: DISCONTINUED | OUTPATIENT
Start: 2018-04-05 | End: 2018-04-08

## 2018-04-05 RX ORDER — ACETAMINOPHEN 325 MG/1
650 TABLET ORAL EVERY 6 HOURS PRN
Status: DISCONTINUED | OUTPATIENT
Start: 2018-04-05 | End: 2018-04-08

## 2018-04-05 RX ORDER — SODIUM CHLORIDE 0.9 % (FLUSH) 0.9 %
3 SYRINGE (ML) INJECTION AS NEEDED
Status: DISCONTINUED | OUTPATIENT
Start: 2018-04-05 | End: 2018-04-08

## 2018-04-05 RX ORDER — SODIUM CHLORIDE 9 MG/ML
INJECTION, SOLUTION INTRAVENOUS CONTINUOUS
Status: DISCONTINUED | OUTPATIENT
Start: 2018-04-05 | End: 2018-04-08

## 2018-04-05 RX ORDER — LISINOPRIL 40 MG/1
40 TABLET ORAL DAILY
Status: DISCONTINUED | OUTPATIENT
Start: 2018-04-06 | End: 2018-04-06

## 2018-04-05 RX ORDER — HYDROCODONE BITARTRATE AND ACETAMINOPHEN 5; 325 MG/1; MG/1
1 TABLET ORAL EVERY 6 HOURS PRN
Status: DISCONTINUED | OUTPATIENT
Start: 2018-04-05 | End: 2018-04-08

## 2018-04-05 RX ORDER — CIPROFLOXACIN 500 MG/1
500 TABLET, FILM COATED ORAL 2 TIMES DAILY
Status: DISCONTINUED | OUTPATIENT
Start: 2018-04-05 | End: 2018-04-08

## 2018-04-05 NOTE — ED PROVIDER NOTES
Patient Seen in: [facility] AND [facility] Emergency Department    History   Patient presents with:  Syncope (cardiovascular, neurologic)    Stated Complaint: syncope     HPI    69-year-old male with history of BPH, hypertension, status post urinary catheter Rem HENT: Negative for congestion, ear pain and sore throat. Eyes: Negative for pain, discharge and redness. Respiratory: Negative for cough, shortness of breath and wheezing. Cardiovascular: Negative for chest pain.    Gastrointestinal: Negative for in all extremities, normal finger to nose b/l, normal gait, no facial asymmetry, normal speech     Skin: Skin is warm and dry. No rash noted. He is not diaphoretic. Psychiatric: He has a normal mood and affect. Nursing note and vitals reviewed. Lymphocyte % 9 %   Monocyte % 7 %   Eosinophil % 0 %   Basophil % 0 %   Neutrophil Absolute 14.6 (H) 1.8 - 7.7 K/UL   Lymphocyte Absolute 1.6 1.0 - 4.0 K/UL   Monocyte Absolute 1.2 (H) 0.0 - 1.0 K/UL   Eosinophil Absolute 0.0 0.0 - 0.7 K/UL   Basophil Ab pertinent discharge summaries, testing, and procedures, and reviewed those reports. Complicating Factors: The patient already has has BPH (benign prostatic hyperplasia);  Nocturia; Colon cancer screening; Skin cancer; BPH with obstruction/lower urinary t

## 2018-04-05 NOTE — TELEPHONE ENCOUNTER
Spoke with pt's spouse and determined that pt is urinating well but has been c/o nausea since coming home from our office this AM. States that he has not vomited.  Then she informed that he went to the [place] earlier and fainted while on the toilet and fell and

## 2018-04-05 NOTE — TELEPHONE ENCOUNTER
Pt phones with update. States his urine is clear yellow.  Placed on nurse visit schedule for this am.

## 2018-04-05 NOTE — H&P
[facility]    PATIENT'S NAME: Brian Mariano   ATTENDING PHYSICIAN: Fang Sanchez MD   PATIENT ACCOUNT#:   [de-identified]    LOCATION:  [place] [address]  MEDICAL RECORD #:   [id number]       YOB: 1950  ADMISSION DATE:       04/0 tobacco.  Occasional alcohol. No drug use. Lives with his family. Usually independent with basic activities of daily living.      REVIEW OF SYSTEMS:  The patient said that, after his GreenLight prostate vaporization procedure 2 days ago, he has been garfield echocardiogram with Doppler and carotid ultrasound. Fall precautions. Urinalysis and culture, if necessary. Further recommendations to follow.     Dictated By Dorita Olea MD  d: 04/05/2018 16:17:58  t: 04/05/2018 16:41:58  Job [id number]  FB/C0

## 2018-04-05 NOTE — PROGRESS NOTES
Pt presents for nurse visit for voiding trial decath. Pt brought back into exam room, whereby he was properly identified by spelling of last name and  Current sahni catheter draining clear yellow urine into leg bag.  Sahni catheter balloon deflated of

## 2018-04-05 NOTE — ED INITIAL ASSESSMENT (HPI)
Patient presents after having a witnessed syncopal event x2 at home. Patient hit his head and complaining of neck pain.

## 2018-04-05 NOTE — TELEPHONE ENCOUNTER
Pt was seen today for RN visit, pts spouse states pt is urinating fine, pt has urinated twice and each time the pt feels very nauseous and falls. Pts spouse states after putting pt in a sitting position twice he has fainted. VENANCIO unable to reach RN.  Pls sheela

## 2018-04-06 ENCOUNTER — APPOINTMENT (OUTPATIENT)
Dept: CV DIAGNOSTICS | Facility: HOSPITAL | Age: 68
DRG: 641 | End: 2018-04-06
Attending: HOSPITALIST
Payer: MEDICARE

## 2018-04-06 PROCEDURE — 99232 SBSQ HOSP IP/OBS MODERATE 35: CPT | Performed by: HOSPITALIST

## 2018-04-06 PROCEDURE — 93306 TTE W/DOPPLER COMPLETE: CPT | Performed by: HOSPITALIST

## 2018-04-06 RX ORDER — POTASSIUM CHLORIDE 20 MEQ/1
40 TABLET, EXTENDED RELEASE ORAL EVERY 4 HOURS
Status: COMPLETED | OUTPATIENT
Start: 2018-04-06 | End: 2018-04-06

## 2018-04-06 RX ORDER — PHENAZOPYRIDINE HYDROCHLORIDE 100 MG/1
100 TABLET, FILM COATED ORAL 3 TIMES DAILY PRN
Status: DISCONTINUED | OUTPATIENT
Start: 2018-04-06 | End: 2018-04-08

## 2018-04-06 RX ORDER — LISINOPRIL 40 MG/1
40 TABLET ORAL NIGHTLY
Status: DISCONTINUED | OUTPATIENT
Start: 2018-04-06 | End: 2018-04-08

## 2018-04-06 NOTE — PROGRESS NOTES
[facility]    Progress Note    Gabriela Burgos Patient Status:  Inpatient    1950 MRN [id number]   Location [facility] 3W/SW Attending Armani Esquivel MD   Hosp Day # 1 PCP Kami Negrete MD       Subjective:   Caren Mahoney free water intake  Cont 0.9 NS at current rate  Monitor BMP    Hypokalemia  Improved  Monitor     BPH  s/p GreenLight laser vaporization of prostate  Cont abx  Cont pain contorl  Cont PRN pyridium for dysuria    DVT PPx: SCDs  Full Code        Results: by MD Michael Morales.  Jennifer Moore MD  4/6/2018

## 2018-04-06 NOTE — PLAN OF CARE
Problem: GASTROINTESTINAL - ADULT  Goal: Minimal or absence of nausea and vomiting  INTERVENTIONS:  - Maintain adequate hydration with IV or PO as ordered and tolerated  - Nasogastric tube to low intermittent suction as ordered  - Evaluate effectiveness of frequently for physical needs  - Identify cognitive and physical deficits and behaviors that affect risk of falls.   - [place] fall precautions as indicated by assessment.  - Educate pt/family on patient safety including physical limitations  - Instruct p

## 2018-04-06 NOTE — PLAN OF CARE
GASTROINTESTINAL - ADULT    • Minimal or absence of nausea and vomiting Progressing        GENITOURINARY - ADULT    • Absence of urinary retention Progressing        SAFETY ADULT - FALL    • Free from fall injury Progressing          Impaired Cognition

## 2018-04-06 NOTE — PHYSICAL THERAPY NOTE
PHYSICAL THERAPY Quick EVALUATION - INPATIENT     Room Number: 332/332-A  Evaluation Date: 4/6/2018  Type of Evaluation: Initial   Physician Order: PT Eval and Treat    Presenting Problem:  (Hyponatremia, Syncope, collapse, fall)  Reason for Therapy: [facility] services. Please re-order if a new functional limitation presents during this admission.     PHYSICAL THERAPY MEDICAL/SOCIAL HISTORY     HISTORY OF PRESENT ILLNESS per MD notes in EMR:  The patient is a 78-year-old  male who had laser Green drives and still goes for [employer] classes    SUBJECTIVE  Pt stated \"I feel ok\"    PHYSICAL THERAPY EXAMINATION     OBJECTIVE     Fall Risk: Standard fall risk    WEIGHT BEARING RESTRICTION                PAIN ASSESSMENT  Ratin  Location: denies pain and stair trng. Patient End of Session: In bed; With [facility] staff;Needs met;Call light within reach;RN aware of session/findings; All patient questions and concerns addressed; Alarm set    Patient was able to achieve the following . ..    Patient able to transfe

## 2018-04-07 PROCEDURE — 99233 SBSQ HOSP IP/OBS HIGH 50: CPT | Performed by: HOSPITALIST

## 2018-04-07 RX ORDER — POTASSIUM CHLORIDE 20 MEQ/1
40 TABLET, EXTENDED RELEASE ORAL ONCE
Status: COMPLETED | OUTPATIENT
Start: 2018-04-07 | End: 2018-04-07

## 2018-04-07 RX ORDER — ARIPIPRAZOLE 15 MG/1
40 TABLET ORAL ONCE
Status: DISCONTINUED | OUTPATIENT
Start: 2018-04-07 | End: 2018-04-08

## 2018-04-07 RX ORDER — 0.9 % SODIUM CHLORIDE 0.9 %
VIAL (ML) INJECTION
Status: COMPLETED
Start: 2018-04-07 | End: 2018-04-07

## 2018-04-07 RX ORDER — ARIPIPRAZOLE 15 MG/1
40 TABLET ORAL ONCE
Status: COMPLETED | OUTPATIENT
Start: 2018-04-07 | End: 2018-04-07

## 2018-04-07 RX ORDER — ARIPIPRAZOLE 15 MG/1
20 TABLET ORAL DAILY
Status: DISCONTINUED | OUTPATIENT
Start: 2018-04-07 | End: 2018-04-07

## 2018-04-07 RX ORDER — ARIPIPRAZOLE 15 MG/1
40 TABLET ORAL EVERY 4 HOURS
Status: DISCONTINUED | OUTPATIENT
Start: 2018-04-07 | End: 2018-04-07

## 2018-04-07 NOTE — PLAN OF CARE
GASTROINTESTINAL - ADULT    • Minimal or absence of nausea and vomiting Progressing        GENITOURINARY - ADULT    • Absence of urinary retention Progressing        Impaired Cognition    • Patient will exhibit improved attention, thought processing and/or

## 2018-04-07 NOTE — PROGRESS NOTES
[facility]    Progress Note    Rachel Brumfield Patient Status:  Inpatient    1950 MRN [id number]   Location [facility] 3W/SW Attending Faviola Carranza MD   Hosp Day # 2 PCP Boy Muniz MD       Subjective:   Ilene Mejia tablet, Oral, Q6H PRN    Assessment and Plan:     Hyponatremia  IMPROVING  134 this AM  hypoosmolar, m/l dilutional due to increased free water intake  Will reduce 0.9 NS rate  Monitor BMP    Hypokalemia  worsening  To receive PO and IV replacement  Mag ok on 4/05/2018 at 22:22     Approved by (CST): Steven Pickett MD on 4/05/2018 at 22:25          Ekg 12-lead    Result Date: 4/5/2018  ECG Report  Interpretation  -------------------------- Sinus Rhythm WITHIN NORMAL LIMITS No previous ECGs available Electr

## 2018-04-08 VITALS
HEIGHT: 73 IN | BODY MASS INDEX: 21.71 KG/M2 | WEIGHT: 163.81 LBS | OXYGEN SATURATION: 97 % | RESPIRATION RATE: 18 BRPM | SYSTOLIC BLOOD PRESSURE: 154 MMHG | HEART RATE: 65 BPM | TEMPERATURE: 98 F | DIASTOLIC BLOOD PRESSURE: 84 MMHG

## 2018-04-08 PROCEDURE — 99239 HOSP IP/OBS DSCHRG MGMT >30: CPT | Performed by: HOSPITALIST

## 2018-04-08 RX ORDER — ARIPIPRAZOLE 15 MG/1
40 TABLET ORAL EVERY 4 HOURS
Status: DISCONTINUED | OUTPATIENT
Start: 2018-04-08 | End: 2018-04-08

## 2018-04-08 RX ORDER — POTASSIUM CHLORIDE 20 MEQ/1
20 TABLET, EXTENDED RELEASE ORAL 2 TIMES DAILY
Qty: 6 TABLET | Refills: 0 | Status: SHIPPED | OUTPATIENT
Start: 2018-04-08 | End: 2018-04-11

## 2018-04-08 RX ORDER — PHENAZOPYRIDINE HYDROCHLORIDE 100 MG/1
100 TABLET, FILM COATED ORAL 3 TIMES DAILY PRN
Qty: 20 TABLET | Refills: 0 | Status: SHIPPED | OUTPATIENT
Start: 2018-04-08 | End: 2018-04-18 | Stop reason: ALTCHOICE

## 2018-04-08 RX ORDER — MAGNESIUM OXIDE 400 MG (241.3 MG MAGNESIUM) TABLET
400 TABLET ONCE
Status: COMPLETED | OUTPATIENT
Start: 2018-04-08 | End: 2018-04-08

## 2018-04-08 RX ORDER — POTASSIUM CHLORIDE 20 MEQ/1
40 TABLET, EXTENDED RELEASE ORAL ONCE
Status: COMPLETED | OUTPATIENT
Start: 2018-04-08 | End: 2018-04-08

## 2018-04-08 NOTE — PLAN OF CARE
Problem: GASTROINTESTINAL - ADULT  Goal: Minimal or absence of nausea and vomiting  INTERVENTIONS:  - Maintain adequate hydration with IV or PO as ordered and tolerated  - Nasogastric tube to low intermittent suction as ordered  - Evaluate effectiveness of fall injury  INTERVENTIONS:  - Assess pt frequently for physical needs  - Identify cognitive and physical deficits and behaviors that affect risk of falls.   - [place] fall precautions as indicated by assessment.  - Educate pt/family on patient safety inc

## 2018-04-08 NOTE — DISCHARGE SUMMARY
[facility]    Discharge Summary    Jarred Gimenez Patient Status:  Inpatient    1950 MRN [id number]   Location [facility] 3W/SW Attending Tawnya Reyna MD   Hosp Day # 3 PCP Alena Brandt MD     Date of Admissi noted.  Integument: No lesions. No erythema. Psychiatric: Appropriate mood and affect.       History of Present Illness:   Per Dr. Karla Hardy  The patient is a 43-year-old  male who had laser GreenLight vaporization of his prostate procedure done 2 (two) times daily.    Stop taking on:  4/11/2018  Quantity:  6 tablet  Refills:  0        CHANGE how you take these medications      Instructions Prescription details   Ciprofloxacin HCl 500 MG Tabs  Commonly known as:  CIPRO  What changed:  Another medicat Where to Get Your Medications      Please  your prescriptions at the location directed by your doctor or nurse    Bring a paper prescription for each of these medications  · Phenazopyridine HCl 100 MG Tabs  · Potassium Chloride ER 20 MEQ Tbcr

## 2018-04-09 ENCOUNTER — PATIENT OUTREACH (OUTPATIENT)
Dept: CASE MANAGEMENT | Age: 68
End: 2018-04-09

## 2018-04-09 ENCOUNTER — TELEPHONE (OUTPATIENT)
Dept: SURGERY | Facility: CLINIC | Age: 68
End: 2018-04-09

## 2018-04-09 DIAGNOSIS — N13.8 BPH WITH OBSTRUCTION/LOWER URINARY TRACT SYMPTOMS: ICD-10-CM

## 2018-04-09 DIAGNOSIS — R55 SYNCOPE AND COLLAPSE: ICD-10-CM

## 2018-04-09 DIAGNOSIS — E87.1 HYPONATREMIA: ICD-10-CM

## 2018-04-09 DIAGNOSIS — D72.829 LEUKOCYTOSIS, UNSPECIFIED TYPE: ICD-10-CM

## 2018-04-09 DIAGNOSIS — N40.1 BPH WITH OBSTRUCTION/LOWER URINARY TRACT SYMPTOMS: ICD-10-CM

## 2018-04-09 NOTE — TELEPHONE ENCOUNTER
Pt called stating pt had green light laser surgery on 4-3-18. Pt's electrolytes went out of balance. Pt was hospitalized on 4-5-18. Discharged 4-8-18. pvk visited pt in the hospital.  Discharging physician recommended pt to see pvk in 2 weeks.   Pt has

## 2018-04-09 NOTE — TELEPHONE ENCOUNTER
Phoned pt and spoke with him.  He states he is feeling much better and is very thankful to  for visiting him in the hospital. He also is suggesting that post op instructions should include guidelines on how much water to drink,  because he feels he \"

## 2018-04-10 ENCOUNTER — TELEPHONE (OUTPATIENT)
Dept: INTERNAL MEDICINE CLINIC | Facility: CLINIC | Age: 68
End: 2018-04-10

## 2018-04-10 NOTE — PROGRESS NOTES
Initial Post Discharge Follow Up   Discharge Date: 4/8/18  Contact Date: 4/9/2018    Consent Verification:  Assessment Completed With: Patient  HIPAA Verified?   Yes    Discharge Dx:    Syncope, hyponatremia, leukocytosis, hypokalemia  HX: BPH S/P Green 6 (six) hours as needed for Pain.  Disp: 10 tablet Rfl: 0   Neomycin-Polymyxin-HC [id number] Otic Suspension 3 drops tid to ear for 10 days- right ear Disp: 1 Bottle Rfl: 0   Ketoconazole 2 % External Shampoo PRANAV AA AND LEAVE ON 5 TO 10 MINUTES AND RINSE O PCP?  (DME, meds, disease concerns, Etc): Yes Patient requested that his preferred pharmacy be changed to the [facility] [address] on the corner of [facility]. Phone: [phone]. [facility] sent a message to PCP's office.     Ethan Stephenson questions or needs, he states he will. [x]  Discharge Summary, Discharge medications reviewed/discussed/and reconciled with patient, and orders reviewed and discussed. Any changes or updates to medications and or orders sent to PCP.

## 2018-04-10 NOTE — TELEPHONE ENCOUNTER
[facility] in [facility] is on the patients pharmacy list clicked the indicator for preferred. Patient may need to state his preference for medications regarding pharmacy when he request the refills from that pharmacy this reduces errors.  Patients should request t

## 2018-04-10 NOTE — TELEPHONE ENCOUNTER
While on TCM call with patient. Patient requested that his chart be updated with the correct pharmacy.  His preferred pharmacy for his insurance is [facility] on [address] [place], Phone [phone]    Triage: Please change this

## 2018-04-16 ENCOUNTER — APPOINTMENT (OUTPATIENT)
Dept: LAB | Facility: HOSPITAL | Age: 68
End: 2018-04-16
Attending: HOSPITALIST
Payer: MEDICARE

## 2018-04-16 DIAGNOSIS — E87.1 HYPONATREMIA: ICD-10-CM

## 2018-04-16 PROCEDURE — 36415 COLL VENOUS BLD VENIPUNCTURE: CPT

## 2018-04-16 PROCEDURE — 80048 BASIC METABOLIC PNL TOTAL CA: CPT

## 2018-04-18 ENCOUNTER — OFFICE VISIT (OUTPATIENT)
Dept: INTERNAL MEDICINE CLINIC | Facility: CLINIC | Age: 68
End: 2018-04-18

## 2018-04-18 VITALS
TEMPERATURE: 98 F | SYSTOLIC BLOOD PRESSURE: 130 MMHG | WEIGHT: 163 LBS | RESPIRATION RATE: 17 BRPM | HEIGHT: 70.5 IN | DIASTOLIC BLOOD PRESSURE: 82 MMHG | HEART RATE: 79 BPM | BODY MASS INDEX: 23.08 KG/M2 | OXYGEN SATURATION: 98 %

## 2018-04-18 DIAGNOSIS — E87.6 HYPOKALEMIA: Primary | ICD-10-CM

## 2018-04-18 DIAGNOSIS — N40.1 BPH WITH OBSTRUCTION/LOWER URINARY TRACT SYMPTOMS: ICD-10-CM

## 2018-04-18 DIAGNOSIS — N13.8 BPH WITH OBSTRUCTION/LOWER URINARY TRACT SYMPTOMS: ICD-10-CM

## 2018-04-18 DIAGNOSIS — R55 SYNCOPE, UNSPECIFIED SYNCOPE TYPE: ICD-10-CM

## 2018-04-18 PROCEDURE — 1111F DSCHRG MED/CURRENT MED MERGE: CPT | Performed by: FAMILY MEDICINE

## 2018-04-18 PROCEDURE — 99495 TRANSJ CARE MGMT MOD F2F 14D: CPT | Performed by: FAMILY MEDICINE

## 2018-04-18 RX ORDER — MAGNESIUM AMINO ACID CHELATE 27 MG
TABLET ORAL
COMMUNITY
Start: 2015-01-01

## 2018-04-18 RX ORDER — POTASSIUM CHLORIDE 1500 MG/1
20 TABLET, FILM COATED, EXTENDED RELEASE ORAL DAILY
Qty: 30 TABLET | Refills: 2 | Status: SHIPPED | OUTPATIENT
Start: 2018-04-18 | End: 2018-05-18

## 2018-04-18 NOTE — PROGRESS NOTES
HPI:    Rachel Brumfield is a 76year old male here today for hospital follow up.    He was discharged from Inpatient hospital, [facility]  to Home   Admission Date: 4/5/18   Discharge Date: 4/8/18  Hospital Discharge Diagnoses (since 3/19/2018)   N Meds:    Current Outpatient Prescriptions on File Prior to Visit:  Ketoconazole 2 % External Shampoo PRANAV AA AND LEAVE ON 5 TO 10 MINUTES AND RINSE ON CHEST QD UTD   chlorthalidone 25 MG Oral Tab Take 0.5 tablets (12.5 mg total) by mouth daily.    lisinopril with exertion  CARDIOVASCULAR: denies chest pain on exertion or palpitations  GI: denies abdominal pain, denies heartburn, denies diarrhea  MUSCULOSKELETAL: denies pain, normal range of motion of extremities  NEURO: denies headaches, denies dizziness, kaila with obstruction/lower urinary tract symptoms  Doing well  No pain  Has f/u appt with urology this week    3.  Syncope, unspecified syncope type  No further episodes since hospital discharge  likely vasovagal from pain  Pt also was hyponatremic from drinkin

## 2018-04-20 ENCOUNTER — OFFICE VISIT (OUTPATIENT)
Dept: SURGERY | Facility: CLINIC | Age: 68
End: 2018-04-20

## 2018-04-20 VITALS
SYSTOLIC BLOOD PRESSURE: 104 MMHG | WEIGHT: 165 LBS | HEIGHT: 70.5 IN | TEMPERATURE: 98 F | DIASTOLIC BLOOD PRESSURE: 70 MMHG | BODY MASS INDEX: 23.36 KG/M2

## 2018-04-20 DIAGNOSIS — R35.1 NOCTURIA: ICD-10-CM

## 2018-04-20 DIAGNOSIS — N40.1 BENIGN NON-NODULAR PROSTATIC HYPERPLASIA WITH LOWER URINARY TRACT SYMPTOMS: Primary | ICD-10-CM

## 2018-04-20 DIAGNOSIS — N21.0 BLADDER STONES: ICD-10-CM

## 2018-04-20 NOTE — PATIENT INSTRUCTIONS
1.  Please tried to take your vitamins and supplements in the morning, rather than in the evening so as to decrease the amount of liquids you are drinking in the evening. We are hoping to improve on your nocturia/ waking up at night to urinate.     2.  If

## 2018-04-20 NOTE — PROGRESS NOTES
HPI:    Patient ID: Alejandro Lerma is a 76year old male. HPI     1. Voiding Dysfunction  S/P Rex Boehringer laser ablation 4/3/18.  Patient has current AUA score of 9, moderate voiding dysfunction category, slightly improved compared to previous score o no nodules; chooses observation for BPH.   12/22/2017 office visit with me; Prostate 50 g; 4+ enlarged, base able to be palpated, no palpable nodules or indurations; has stated great interest in green light but is still undecided as to when he would like t OR)  Disp:  Rfl:    Vitamins-Lipotropics (BALANCED B-100 COMPLEX CR OR)  Disp:  Rfl:    Potassium Chloride ER 20 MEQ Oral Tab CR Take 20 mEq by mouth daily.  Disp: 30 tablet Rfl: 2   Ketoconazole 2 % External Shampoo PRANAV AA AND LEAVE ON 5 TO 10 MINUTES AND Mother       Social History: Smoking status: Former Smoker                                                              Packs/day: 1.00      Years: 10.00        Quit date: 3/27/1980  Smokeless tobacco: Never Used                      Alcohol use:  Yes indurations   Musculoskeletal: Normal range of motion. He exhibits no edema. Neurological: He is alert and oriented to person, place, and time. Skin: Skin is dry. Psychiatric: He has a normal mood and affect.  Thought content normal.   Nursing note an stop overtime; I did recommend he stop oil and  Aspirin and glucosamine–chondroitin until he does not see any visible blood for one week. Patient feels that his weak stream has improved slightly.  I explained that his symptoms should still improve slowly ov prepared under the direction and in the presence of Violet Telles MD.   Electronically Signed: Hortencia Montes, 4/20/2018, 9:53 AM.    IViolet MD,  personally performed the services described in this documentation.  All medical record en

## 2018-06-08 ENCOUNTER — APPOINTMENT (OUTPATIENT)
Dept: LAB | Facility: HOSPITAL | Age: 68
End: 2018-06-08
Attending: FAMILY MEDICINE
Payer: MEDICARE

## 2018-06-08 DIAGNOSIS — E87.6 HYPOKALEMIA: ICD-10-CM

## 2018-06-08 PROCEDURE — 36415 COLL VENOUS BLD VENIPUNCTURE: CPT

## 2018-06-08 PROCEDURE — 80048 BASIC METABOLIC PNL TOTAL CA: CPT

## 2018-06-28 ENCOUNTER — AUDIOLOGY DOCUMENTATION (OUTPATIENT)
Dept: AUDIOLOGY | Facility: CLINIC | Age: 68
End: 2018-06-28

## 2018-06-28 NOTE — TELEPHONE ENCOUNTER
Patient dropped off left hearing aid with complaint that it was not working. Found wax in tube and in dome. Replaced dome and listening check is good. Called for  at . Quoted $35. Charges entered.

## 2018-07-18 ENCOUNTER — OFFICE VISIT (OUTPATIENT)
Dept: INTERNAL MEDICINE CLINIC | Facility: CLINIC | Age: 68
End: 2018-07-18
Payer: MEDICARE

## 2018-07-18 VITALS
HEIGHT: 70.5 IN | RESPIRATION RATE: 12 BRPM | TEMPERATURE: 98 F | DIASTOLIC BLOOD PRESSURE: 64 MMHG | SYSTOLIC BLOOD PRESSURE: 112 MMHG | BODY MASS INDEX: 23.5 KG/M2 | OXYGEN SATURATION: 99 % | HEART RATE: 62 BPM | WEIGHT: 166 LBS

## 2018-07-18 DIAGNOSIS — N40.0 BENIGN PROSTATIC HYPERPLASIA WITHOUT LOWER URINARY TRACT SYMPTOMS: ICD-10-CM

## 2018-07-18 DIAGNOSIS — I10 ESSENTIAL HYPERTENSION: ICD-10-CM

## 2018-07-18 DIAGNOSIS — Z00.00 ENCOUNTER FOR MEDICARE ANNUAL WELLNESS EXAM: Primary | ICD-10-CM

## 2018-07-18 LAB
ALBUMIN SERPL BCP-MCNC: 3.9 G/DL (ref 3.5–4.8)
ALBUMIN/GLOB SERPL: 1.4 {RATIO} (ref 1–2)
ALP SERPL-CCNC: 64 U/L (ref 32–100)
ALT SERPL-CCNC: 18 U/L (ref 17–63)
ANION GAP SERPL CALC-SCNC: 8 MMOL/L (ref 0–18)
AST SERPL-CCNC: 26 U/L (ref 15–41)
BILIRUB SERPL-MCNC: 1 MG/DL (ref 0.3–1.2)
BUN SERPL-MCNC: 14 MG/DL (ref 8–20)
BUN/CREAT SERPL: 16.5 (ref 10–20)
CALCIUM SERPL-MCNC: 8.9 MG/DL (ref 8.5–10.5)
CHLORIDE SERPL-SCNC: 100 MMOL/L (ref 95–110)
CHOLEST SERPL-MCNC: 167 MG/DL (ref 110–200)
CO2 SERPL-SCNC: 26 MMOL/L (ref 22–32)
CREAT SERPL-MCNC: 0.85 MG/DL (ref 0.5–1.5)
GLOBULIN PLAS-MCNC: 2.7 G/DL (ref 2.5–3.7)
GLUCOSE SERPL-MCNC: 88 MG/DL (ref 70–99)
HDLC SERPL-MCNC: 56 MG/DL
LDLC SERPL CALC-MCNC: 92 MG/DL (ref 0–99)
NONHDLC SERPL-MCNC: 111 MG/DL
OSMOLALITY UR CALC.SUM OF ELEC: 278 MOSM/KG (ref 275–295)
PATIENT FASTING: YES
POTASSIUM SERPL-SCNC: 3.6 MMOL/L (ref 3.3–5.1)
PROT SERPL-MCNC: 6.6 G/DL (ref 5.9–8.4)
SODIUM SERPL-SCNC: 134 MMOL/L (ref 136–144)
TRIGL SERPL-MCNC: 94 MG/DL (ref 1–149)

## 2018-07-18 PROCEDURE — G0009 ADMIN PNEUMOCOCCAL VACCINE: HCPCS | Performed by: FAMILY MEDICINE

## 2018-07-18 PROCEDURE — 80053 COMPREHEN METABOLIC PANEL: CPT | Performed by: FAMILY MEDICINE

## 2018-07-18 PROCEDURE — 80061 LIPID PANEL: CPT | Performed by: FAMILY MEDICINE

## 2018-07-18 PROCEDURE — 90732 PPSV23 VACC 2 YRS+ SUBQ/IM: CPT | Performed by: FAMILY MEDICINE

## 2018-07-18 PROCEDURE — G0439 PPPS, SUBSEQ VISIT: HCPCS | Performed by: FAMILY MEDICINE

## 2018-07-18 PROCEDURE — 36415 COLL VENOUS BLD VENIPUNCTURE: CPT | Performed by: FAMILY MEDICINE

## 2018-07-18 NOTE — PROGRESS NOTES
HPI:   Juan Ron is a 76year old male who presents for a Medicare Annual Wellness visit.     Patient Active Problem List:     BPH (benign prostatic hyperplasia)     Nocturia     Colon cancer screening     Skin cancer     BPH with obstruction/lowe difficulty seeing?: 1-Yes    Do you have any difficulty walking or getting up?: 0-No    Do you have any tripping hazards?: 0-No    Are you on multiple medications?: 1-Yes    Does pain affect your day to day activities?: 0-No     Have you had any memory iss (40 mg total) by mouth daily. (Patient taking differently: Take 40 mg by mouth nightly.  ) Disp: 90 tablet Rfl: 2   Omega-3 Fatty Acids (FISH OIL) 500 MG Oral Cap Take by mouth daily.    Disp:  Rfl:    Glucosamine-Chondroitin 500-400 MG Oral Cap Take by marce vision  HEENT: denies nasal congestion, sinus pain or ST  LUNGS: denies shortness of breath with exertion  CARDIOVASCULAR: denies chest pain on exertion  GI: denies abdominal pain, denies heartburn  :  per night nocturia, no complaint of urinary incontin exam  Pneumonia shot   - COMP METABOLIC PANEL (14); Future  - LIPID PANEL; Future  - COMP METABOLIC PANEL (14)  - LIPID PANEL    2. Essential hypertension  Controlled, CPM    3.  Benign prostatic hyperplasia without lower urinary tract symptoms  Cont f/u wi Update Immunization Activity if applicable    Hepatitis B No orders found for this or any previous visit. Update Immunization Activity if applicable    Tetanus No orders found for this or any previous visit.  Update Immunization Activity if applicable

## 2018-07-18 NOTE — PROGRESS NOTES
LIPID and CMP labs drawn per Dr Conway April orders.  Pt tolerated lab draw well     PNUEMOVAX 23 0.5ml administered to RD IM, VIS given to pt, pt tolerated vaccines well

## 2018-07-18 NOTE — PATIENT INSTRUCTIONS
Prevention Guidelines, Men Ages 72 and Older  Screening tests and vaccines are an important part of managing your health. Health counseling is essential, too. Below are guidelines for these, for men ages 72 and older.  Talk with your healthcare provider t Prostate cancer All men in this age group, talk to healthcare provider about risks and benefits of digital rectal exam (TAYA) and prostate-specific antigen (PSA) screening1 At routine exams   Syphilis Men at increased risk for infection – talk with your hea Fall prevention (exercise, vitamin D supplements) All men in this age group At routine exams   Sexually transmitted infection Men at increased risk for infection – talk with your healthcare provider At routine exams   Use of daily aspirin Men ages 39 to 78

## 2018-08-13 ENCOUNTER — APPOINTMENT (OUTPATIENT)
Dept: LAB | Facility: HOSPITAL | Age: 68
End: 2018-08-13
Attending: FAMILY MEDICINE
Payer: MEDICARE

## 2018-08-13 DIAGNOSIS — R35.1 NOCTURIA: ICD-10-CM

## 2018-08-13 DIAGNOSIS — E87.1 HYPONATREMIA: ICD-10-CM

## 2018-08-13 LAB
ANION GAP SERPL CALC-SCNC: 8 MMOL/L (ref 0–18)
BILIRUB UR QL: NEGATIVE
BUN SERPL-MCNC: 16 MG/DL (ref 8–20)
BUN/CREAT SERPL: 17.8 (ref 10–20)
CALCIUM SERPL-MCNC: 8.9 MG/DL (ref 8.5–10.5)
CHLORIDE SERPL-SCNC: 98 MMOL/L (ref 95–110)
CLARITY UR: CLEAR
CO2 SERPL-SCNC: 29 MMOL/L (ref 22–32)
COLOR UR: YELLOW
CREAT SERPL-MCNC: 0.9 MG/DL (ref 0.5–1.5)
GLUCOSE SERPL-MCNC: 86 MG/DL (ref 70–99)
GLUCOSE UR-MCNC: NEGATIVE MG/DL
KETONES UR-MCNC: NEGATIVE MG/DL
NITRITE UR QL STRIP.AUTO: NEGATIVE
OSMOLALITY UR CALC.SUM OF ELEC: 280 MOSM/KG (ref 275–295)
PH UR: 7 [PH] (ref 5–8)
POTASSIUM SERPL-SCNC: 3.6 MMOL/L (ref 3.3–5.1)
PROT UR-MCNC: NEGATIVE MG/DL
RBC #/AREA URNS AUTO: 2 /HPF
SODIUM SERPL-SCNC: 135 MMOL/L (ref 136–144)
SP GR UR STRIP: 1.01 (ref 1–1.03)
UROBILINOGEN UR STRIP-ACNC: <2
VIT C UR-MCNC: NEGATIVE MG/DL
WBC #/AREA URNS AUTO: 9 /HPF

## 2018-08-13 PROCEDURE — 87086 URINE CULTURE/COLONY COUNT: CPT | Performed by: NURSE PRACTITIONER

## 2018-08-13 PROCEDURE — 80048 BASIC METABOLIC PNL TOTAL CA: CPT

## 2018-08-13 PROCEDURE — 81001 URINALYSIS AUTO W/SCOPE: CPT

## 2018-08-13 PROCEDURE — 36415 COLL VENOUS BLD VENIPUNCTURE: CPT

## 2018-08-14 DIAGNOSIS — R35.1 NOCTURIA: ICD-10-CM

## 2018-08-20 ENCOUNTER — OFFICE VISIT (OUTPATIENT)
Dept: SURGERY | Facility: CLINIC | Age: 68
End: 2018-08-20
Payer: MEDICARE

## 2018-08-20 VITALS
SYSTOLIC BLOOD PRESSURE: 118 MMHG | DIASTOLIC BLOOD PRESSURE: 80 MMHG | TEMPERATURE: 98 F | HEART RATE: 61 BPM | WEIGHT: 165 LBS | BODY MASS INDEX: 23.62 KG/M2 | HEIGHT: 70 IN | RESPIRATION RATE: 16 BRPM

## 2018-08-20 DIAGNOSIS — Z12.5 PROSTATE CANCER SCREENING: ICD-10-CM

## 2018-08-20 DIAGNOSIS — R35.1 NOCTURIA: ICD-10-CM

## 2018-08-20 DIAGNOSIS — N40.1 BENIGN NON-NODULAR PROSTATIC HYPERPLASIA WITH LOWER URINARY TRACT SYMPTOMS: Primary | ICD-10-CM

## 2018-08-20 PROCEDURE — 99213 OFFICE O/P EST LOW 20 MIN: CPT | Performed by: UROLOGY

## 2018-08-20 PROCEDURE — G0463 HOSPITAL OUTPT CLINIC VISIT: HCPCS | Performed by: UROLOGY

## 2018-08-20 NOTE — PATIENT INSTRUCTIONS
1.  Please notify my office nurses immediately if your stream were to become noticeably weaker--if that were to happen, we would then be suspicious of a possible stricture of the urethra. 2.  Visit with me beginning of January 2019.   Blood draw for PSA

## 2018-11-20 ENCOUNTER — OFFICE VISIT (OUTPATIENT)
Dept: AUDIOLOGY | Facility: CLINIC | Age: 68
End: 2018-11-20
Payer: MEDICARE

## 2018-11-20 DIAGNOSIS — H90.3 SENSORINEURAL HEARING LOSS, BILATERAL: Primary | ICD-10-CM

## 2018-11-20 PROCEDURE — V5266 BATTERY FOR HEARING DEVICE: HCPCS | Performed by: AUDIOLOGIST

## 2018-11-20 PROCEDURE — 92593 HEARING AID CHECK, BOTH EARS: CPT | Performed by: AUDIOLOGIST

## 2018-11-20 NOTE — PROGRESS NOTES
HEARING AID FOLLOW-UP    Johnna Chavarria  1/9/1950  [id number]    Patient is here for an annual  Hearing aid check.      Hearing aid information:   Ria Clark V70P  Right #[id number]  Coupled to custom earmold with standard tubing  Left # [id number] co

## 2018-12-18 DIAGNOSIS — I10 ESSENTIAL HYPERTENSION: ICD-10-CM

## 2018-12-18 RX ORDER — CHLORTHALIDONE 25 MG/1
12.5 TABLET ORAL DAILY
Qty: 90 TABLET | Refills: 2 | Status: SHIPPED | OUTPATIENT
Start: 2018-12-18 | End: 2020-03-03

## 2019-01-09 ENCOUNTER — OFFICE VISIT (OUTPATIENT)
Dept: INTERNAL MEDICINE CLINIC | Facility: CLINIC | Age: 69
End: 2019-01-09
Payer: MEDICARE

## 2019-01-09 VITALS
TEMPERATURE: 98 F | OXYGEN SATURATION: 99 % | BODY MASS INDEX: 24.05 KG/M2 | DIASTOLIC BLOOD PRESSURE: 84 MMHG | RESPIRATION RATE: 17 BRPM | WEIGHT: 168 LBS | HEIGHT: 70 IN | SYSTOLIC BLOOD PRESSURE: 126 MMHG | HEART RATE: 78 BPM

## 2019-01-09 DIAGNOSIS — J01.00 ACUTE NON-RECURRENT MAXILLARY SINUSITIS: ICD-10-CM

## 2019-01-09 DIAGNOSIS — J06.9 UPPER RESPIRATORY TRACT INFECTION, UNSPECIFIED TYPE: Primary | ICD-10-CM

## 2019-01-09 PROCEDURE — 99213 OFFICE O/P EST LOW 20 MIN: CPT | Performed by: FAMILY MEDICINE

## 2019-01-09 RX ORDER — AMOXICILLIN AND CLAVULANATE POTASSIUM 875; 125 MG/1; MG/1
1 TABLET, FILM COATED ORAL 2 TIMES DAILY
Qty: 20 TABLET | Refills: 0 | Status: SHIPPED | OUTPATIENT
Start: 2019-01-09 | End: 2019-01-19

## 2019-01-09 RX ORDER — FLUTICASONE PROPIONATE 50 MCG
2 SPRAY, SUSPENSION (ML) NASAL DAILY
Qty: 1 BOTTLE | Refills: 1 | Status: SHIPPED | OUTPATIENT
Start: 2019-01-09 | End: 2020-01-04

## 2019-01-09 NOTE — PROGRESS NOTES
HPI:   Karley Boo is a 71year old male who presents for sinus pain and congestion for    2  weeks.     No sore throat  Coughing +yellow mucus  Constant drainage  Sinus pressure  Tired, taking naps        Current Outpatient Medications:  lisinopril 4 History:   Procedure Laterality Date   • APPENDECTOMY     • COLONOSCOPY  3/03    [facility]   • COLONOSCOPY & POLYPECTOMY  11/2016    by Dr. Car Rich   • COLONOSCOPY, POSSIBLE BIOPSY, POSSIBLE POLYPECTOMY 64893 N/A 10/28/2016    Performed by Pam Balbuena MD at [facility] S[facility].   NEURO: no gross deficits  Extremities: no edema      ASSESSMENT AND PLAN:   Gildardo Goins is a 71year old male who presents with    1.  Upper respiratory tract infection, unspecified type  Cont muccinex  - Fluticasone Propionate 50 MCG/ACT Nasal

## 2019-01-16 ENCOUNTER — APPOINTMENT (OUTPATIENT)
Dept: LAB | Facility: HOSPITAL | Age: 69
End: 2019-01-16
Attending: UROLOGY
Payer: MEDICARE

## 2019-01-16 DIAGNOSIS — Z12.5 PROSTATE CANCER SCREENING: ICD-10-CM

## 2019-01-16 DIAGNOSIS — R35.1 NOCTURIA: ICD-10-CM

## 2019-01-16 LAB
BILIRUB UR QL: NEGATIVE
CLARITY UR: CLEAR
COLOR UR: YELLOW
COMPLEXED PSA SERPL-MCNC: 2.83 NG/ML (ref 0.01–4)
GLUCOSE UR-MCNC: NEGATIVE MG/DL
HGB UR QL STRIP.AUTO: NEGATIVE
KETONES UR-MCNC: NEGATIVE MG/DL
LEUKOCYTE ESTERASE UR QL STRIP.AUTO: NEGATIVE
NITRITE UR QL STRIP.AUTO: NEGATIVE
PH UR: 6 [PH] (ref 5–8)
PROT UR-MCNC: NEGATIVE MG/DL
PSA SERPL-MCNC: 2.7 NG/ML (ref 0–4)
SP GR UR STRIP: 1.01 (ref 1–1.03)
UROBILINOGEN UR STRIP-ACNC: <2
VIT C UR-MCNC: NEGATIVE MG/DL

## 2019-01-16 PROCEDURE — 36415 COLL VENOUS BLD VENIPUNCTURE: CPT

## 2019-01-16 PROCEDURE — 81003 URINALYSIS AUTO W/O SCOPE: CPT

## 2019-01-23 ENCOUNTER — OFFICE VISIT (OUTPATIENT)
Dept: SURGERY | Facility: CLINIC | Age: 69
End: 2019-01-23
Payer: MEDICARE

## 2019-01-23 VITALS
HEIGHT: 70 IN | DIASTOLIC BLOOD PRESSURE: 84 MMHG | HEART RATE: 66 BPM | BODY MASS INDEX: 24.05 KG/M2 | WEIGHT: 168 LBS | SYSTOLIC BLOOD PRESSURE: 133 MMHG

## 2019-01-23 DIAGNOSIS — N40.1 BENIGN NON-NODULAR PROSTATIC HYPERPLASIA WITH LOWER URINARY TRACT SYMPTOMS: Primary | ICD-10-CM

## 2019-01-23 DIAGNOSIS — Z12.5 PROSTATE CANCER SCREENING: ICD-10-CM

## 2019-01-23 DIAGNOSIS — R35.1 NOCTURIA: ICD-10-CM

## 2019-01-23 PROCEDURE — G0463 HOSPITAL OUTPT CLINIC VISIT: HCPCS | Performed by: UROLOGY

## 2019-01-23 PROCEDURE — 99213 OFFICE O/P EST LOW 20 MIN: CPT | Performed by: UROLOGY

## 2019-01-23 NOTE — PATIENT INSTRUCTIONS
Neva Neely M.D.      1.  Visit end of January or beginning of February 2020. Please get blood draw for PSA screening as well as urinalysis urine test before the visit.   The PSA blood test would have to be drawn on or af

## 2019-01-23 NOTE — PROGRESS NOTES
HPI:    Patient ID: Gabriela Burgos is a 71year old male.     HPI  Voiding Dysfunction  Patient has current AUA score of 5, mild voiding dysfunction category, worse compared to previous score of 4, mild voiding dysfunction category, on 08/20/2018 per Audrey Padilla with me; 1-2mm bladder stones--wants to observe; length of the prostatic urethra was 3.5 cm; the intravesical median lobe was  Mild without major clefting at the bladder neck and Moderate-large BPH obstruction--proceed with green light laser ablation  4/3/ AND LEAVE ON 5 TO 10 MINUTES AND RINSE ON CHEST QD UTD Disp:  Rfl: 4   Omega-3 Fatty Acids (FISH OIL) 500 MG Oral Cap Take by mouth daily. Disp:  Rfl:    Glucosamine-Chondroitin 500-400 MG Oral Cap Take by mouth daily.    Disp:  Rfl:    Cinnamon 500 MG Or Quit date: 3/27/1980        Years since quittin.8      Smokeless tobacco: Never Used    Alcohol use: Yes      Comment: Per NG: Drinks beer and wine, 4 -5 drinks weekly.     Drug use: No       Over the past month, how often have you had a sensation 01/23/19  1004   BP: 133/84   Pulse: 66   Weight: 168 lb (76.2 kg)   Height: 5' 10\" (1.778 m)         Body mass index is 24.11 kg/m².      LABORATORIES  1/16/19 PSA = 2.7; PSA Screen by [facility] = 2.83    8/13/18 creatinine = 0.9; GFR >60  8/13/1 medications. I explained to patient the guidelines of the American urological Association, which do not recommend routine, annual PSA screening in men after the age of 71.   The guidelines further explain  that if a 35-year-old male does not follow the guid documentation. All medical record entries made by the scribe were at my direction and in my presence.   I have reviewed the chart and discharge instructions (if applicable) and agree that the record reflects my personal performance and is accurate and compl

## 2019-03-25 ENCOUNTER — OFFICE VISIT (OUTPATIENT)
Dept: OTOLARYNGOLOGY | Facility: CLINIC | Age: 69
End: 2019-03-25
Payer: MEDICARE

## 2019-03-25 VITALS
DIASTOLIC BLOOD PRESSURE: 76 MMHG | HEIGHT: 70 IN | WEIGHT: 165 LBS | SYSTOLIC BLOOD PRESSURE: 128 MMHG | BODY MASS INDEX: 23.62 KG/M2 | TEMPERATURE: 98 F

## 2019-03-25 DIAGNOSIS — H61.23 BILATERAL IMPACTED CERUMEN: Primary | ICD-10-CM

## 2019-06-21 RX ORDER — POTASSIUM CHLORIDE 1500 MG/1
TABLET, EXTENDED RELEASE ORAL
Qty: 90 TABLET | Refills: 2 | Status: SHIPPED | OUTPATIENT
Start: 2019-06-21 | End: 2020-04-19

## 2019-09-17 DIAGNOSIS — I10 ESSENTIAL HYPERTENSION: ICD-10-CM

## 2019-09-17 RX ORDER — LISINOPRIL 40 MG/1
TABLET ORAL
Qty: 90 TABLET | Refills: 3 | Status: SHIPPED | OUTPATIENT
Start: 2019-09-17 | End: 2020-10-19

## 2020-01-21 ENCOUNTER — APPOINTMENT (OUTPATIENT)
Dept: LAB | Facility: HOSPITAL | Age: 70
End: 2020-01-21
Attending: UROLOGY
Payer: MEDICARE

## 2020-01-21 DIAGNOSIS — Z12.5 PROSTATE CANCER SCREENING: ICD-10-CM

## 2020-01-21 DIAGNOSIS — R35.1 NOCTURIA: ICD-10-CM

## 2020-01-21 LAB
BILIRUB UR QL: NEGATIVE
CLARITY UR: CLEAR
COLOR UR: YELLOW
COMPLEXED PSA SERPL-MCNC: 2.96 NG/ML (ref ?–4)
GLUCOSE UR-MCNC: NEGATIVE MG/DL
HGB UR QL STRIP.AUTO: NEGATIVE
KETONES UR-MCNC: NEGATIVE MG/DL
LEUKOCYTE ESTERASE UR QL STRIP.AUTO: NEGATIVE
NITRITE UR QL STRIP.AUTO: NEGATIVE
PH UR: 7 [PH] (ref 5–8)
PROT UR-MCNC: NEGATIVE MG/DL
SP GR UR STRIP: 1.01 (ref 1–1.03)
UROBILINOGEN UR STRIP-ACNC: <2

## 2020-01-21 PROCEDURE — 81003 URINALYSIS AUTO W/O SCOPE: CPT

## 2020-01-21 PROCEDURE — 36415 COLL VENOUS BLD VENIPUNCTURE: CPT

## 2020-01-23 ENCOUNTER — OFFICE VISIT (OUTPATIENT)
Dept: AUDIOLOGY | Facility: CLINIC | Age: 70
End: 2020-01-23
Payer: MEDICARE

## 2020-01-23 DIAGNOSIS — H90.3 SENSORINEURAL HEARING LOSS, BILATERAL: Primary | ICD-10-CM

## 2020-01-23 PROCEDURE — 92593 HEARING AID CHECK, BOTH EARS: CPT | Performed by: AUDIOLOGIST

## 2020-01-24 NOTE — PROGRESS NOTES
HEARING AID FOLLOW-UP    Bard Norton  1/9/1950  [id number]    Patient is here for annual hearing aid check.      Hearing aid information:   Shaq Ora V70P  Right #[id number]  Coupled to custom earmold with standard tubing  Left # [id number] couple

## 2020-01-29 ENCOUNTER — OFFICE VISIT (OUTPATIENT)
Dept: SURGERY | Facility: CLINIC | Age: 70
End: 2020-01-29
Payer: MEDICARE

## 2020-01-29 VITALS
WEIGHT: 165 LBS | BODY MASS INDEX: 23.62 KG/M2 | HEART RATE: 69 BPM | HEIGHT: 70 IN | SYSTOLIC BLOOD PRESSURE: 127 MMHG | DIASTOLIC BLOOD PRESSURE: 81 MMHG

## 2020-01-29 DIAGNOSIS — R35.1 NOCTURIA: ICD-10-CM

## 2020-01-29 DIAGNOSIS — N40.1 BENIGN NON-NODULAR PROSTATIC HYPERPLASIA WITH LOWER URINARY TRACT SYMPTOMS: Primary | ICD-10-CM

## 2020-01-29 DIAGNOSIS — Z12.5 PROSTATE CANCER SCREENING: ICD-10-CM

## 2020-01-29 PROCEDURE — 99213 OFFICE O/P EST LOW 20 MIN: CPT | Performed by: UROLOGY

## 2020-01-29 PROCEDURE — G0463 HOSPITAL OUTPT CLINIC VISIT: HCPCS | Performed by: UROLOGY

## 2020-01-29 NOTE — PATIENT INSTRUCTIONS
Norm Silva M.D.      1.  To try to decrease the number of times you wake up at night to urinate, please change your schedule for twice daily Metamucil; take a dose in the morning and then the second dose of the d

## 2020-01-29 NOTE — PROGRESS NOTES
HPI:    Patient ID: Jakob Fields is a 79year old male. HPI     Voiding Dysfunction  Chronic.  Patient has current AUA score of 7, mild voiding dysfunction category, worse compared to previous score of 5, mild voiding dysfunction category, on 01/23/ 3 months for re-evaluation.   02/13/2018 office visit with me; plans for US of the prostate and office cystoscopy  03/19/2018 office cystoscopy with me; 1-2mm bladder stones--wants to observe; length of the prostatic urethra was 3.5 cm; the intravesical med • Nutritional Supplements (JUICE PLUS FIBRE OR)      • Vitamins-Lipotropics (BALANCED B-100 COMPLEX CR OR)      • Ketoconazole 2 % External Shampoo PRANAV AA AND LEAVE ON 5 TO 10 MINUTES AND RINSE ON CHEST QD UTD  4   • Omega-3 Fatty Acids (FISH OIL) 500 MG Smoking status: Former Smoker        Packs/day: 1.00        Years: 10.00        Pack years: 10        Quit date: 3/27/1980        Years since quittin.8      Smokeless tobacco: Never Used    Alcohol use: Yes      Comment: Per NG: Drinks beer and wine, mood and affect. Thought content normal.   Nursing note reviewed. 01/29/20  0930   BP: 127/81   Pulse: 69   Weight: 165 lb (74.8 kg)   Height: 5' 10\" (1.778 m)       Body mass index is 23.68 kg/m².      LABORATORIES  01/21/2020 PSA = 2.96; UA marvin night.  I advised patient to restrict fluid intake 3 hours before bedtime, stop caffeine intake by noon, and take metamucil 1 hour before dinner if he would like to decrease his nocturia. He feels problem is stable and will continue to observe.  Patient lily days before visit. 5.  Tentatively plan for PSA in 2022 in light of the excellent current PSA of 2.96 and in light of current guidelines concerning annual PSA screening in your age group.       Orders Placed This Encounter      Urinalysis, Routine- Futur

## 2020-02-04 PROBLEM — M25.511 ACUTE PAIN OF RIGHT SHOULDER: Status: ACTIVE | Noted: 2020-02-04

## 2020-02-04 PROBLEM — M75.41 IMPINGEMENT SYNDROME OF RIGHT SHOULDER: Status: ACTIVE | Noted: 2020-02-04

## 2020-02-04 PROBLEM — M19.011 ARTHRITIS OF SHOULDER REGION, RIGHT: Status: ACTIVE | Noted: 2020-02-04

## 2020-03-01 DIAGNOSIS — I10 ESSENTIAL HYPERTENSION: ICD-10-CM

## 2020-03-03 RX ORDER — CHLORTHALIDONE 25 MG/1
TABLET ORAL
Qty: 90 TABLET | Refills: 0 | Status: SHIPPED | OUTPATIENT
Start: 2020-03-03 | End: 2020-10-26

## 2020-04-13 ENCOUNTER — PATIENT MESSAGE (OUTPATIENT)
Dept: SURGERY | Facility: CLINIC | Age: 70
End: 2020-04-13

## 2020-04-14 ENCOUNTER — PATIENT MESSAGE (OUTPATIENT)
Dept: SURGERY | Facility: CLINIC | Age: 70
End: 2020-04-14

## 2020-04-14 RX ORDER — MUPIROCIN CALCIUM 20 MG/G
1 CREAM TOPICAL 3 TIMES DAILY
Qty: 30 G | Refills: 1 | Status: SHIPPED | OUTPATIENT
Start: 2020-04-14 | End: 2020-04-21

## 2020-04-14 NOTE — TELEPHONE ENCOUNTER
Urology nurses,   I sent patient the following \"my chart\" message ---  Magalie Costa, thank you for your detailed description of your problem.   This is very consistent with a superficially infected sebaceous cyst or epidermoid cyst.  I recommend prescription g

## 2020-04-14 NOTE — TELEPHONE ENCOUNTER
Spoke to patient on the phone. Patient c/o 1 inch \"lump\" left side of groin, red; onset 1 day ago. Patient denies pain, fever. Denies any other urological problems at this time. Patient's last office visit 1/29/2020 for BPH, nocturia.       Dr. Damaris Mccray

## 2020-04-14 NOTE — TELEPHONE ENCOUNTER
From: Phan Dalton  To: Carol Blair MD  Sent: 4/14/2020 2:44 PM CDT  Subject: Non-Urgent Criss Rosario,  When I tried to schedule an appointment with you today, I was told by the [facility] scheduling office that,

## 2020-04-14 NOTE — TELEPHONE ENCOUNTER
From: Gabriela Burgos  To: Apple Reed MD  Sent: 4/13/2020 12:28 PM CDT  Subject: Other    Hi Dr. Leona Ogden,  While drying off after my shower this morning, I found a lump in my groin area.  It's under the skin between my leg and scrotum and skin on

## 2020-04-16 ENCOUNTER — OFFICE VISIT (OUTPATIENT)
Dept: INTERNAL MEDICINE CLINIC | Facility: CLINIC | Age: 70
End: 2020-04-16
Payer: MEDICARE

## 2020-04-16 VITALS
HEART RATE: 68 BPM | BODY MASS INDEX: 24.57 KG/M2 | DIASTOLIC BLOOD PRESSURE: 82 MMHG | OXYGEN SATURATION: 98 % | WEIGHT: 171.63 LBS | HEIGHT: 70 IN | SYSTOLIC BLOOD PRESSURE: 144 MMHG

## 2020-04-16 DIAGNOSIS — L02.91 ABSCESS: Primary | ICD-10-CM

## 2020-04-16 PROCEDURE — 87070 CULTURE OTHR SPECIMN AEROBIC: CPT | Performed by: FAMILY MEDICINE

## 2020-04-16 PROCEDURE — 87077 CULTURE AEROBIC IDENTIFY: CPT | Performed by: FAMILY MEDICINE

## 2020-04-16 PROCEDURE — 87205 SMEAR GRAM STAIN: CPT | Performed by: FAMILY MEDICINE

## 2020-04-16 PROCEDURE — 99213 OFFICE O/P EST LOW 20 MIN: CPT | Performed by: FAMILY MEDICINE

## 2020-04-16 RX ORDER — SULFAMETHOXAZOLE AND TRIMETHOPRIM 800; 160 MG/1; MG/1
1 TABLET ORAL 2 TIMES DAILY
Qty: 20 TABLET | Refills: 0 | Status: SHIPPED | OUTPATIENT
Start: 2020-04-16 | End: 2020-04-26

## 2020-04-16 NOTE — PATIENT INSTRUCTIONS
Warm compresses three times daily for 10 minutes    Antibiotic    Ok to put on topical antibiotic also ( neosporin fine)    Follow up Monday if not improving      Abscess (Antibiotic Treatment Only)  An abscess (sometimes called a “boil”) happens when bact healthcare provider  · Pus or fluid coming from the abscess  · Boil returns after getting better  Date Last Reviewed: 9/1/2016  © 0421-2287 [employer]. [address] [facility], [address] [place] [address]. All rights reserved.  This information is not i

## 2020-04-16 NOTE — PROGRESS NOTES
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 Pt tolerated well. No induration.  Nothing else to I & D  -cx sent  -start bactrim  Pt instructed to apply warm compresses tid, can apply topical antibiotic as well   -f/u next week

## 2020-04-19 RX ORDER — POTASSIUM CHLORIDE 1500 MG/1
TABLET, FILM COATED, EXTENDED RELEASE ORAL
Qty: 90 TABLET | Refills: 0 | Status: SHIPPED | OUTPATIENT
Start: 2020-04-19 | End: 2020-07-20

## 2020-04-23 PROBLEM — G89.29 CHRONIC RIGHT-SIDED LOW BACK PAIN WITH RIGHT-SIDED SCIATICA: Status: ACTIVE | Noted: 2020-04-23

## 2020-04-23 PROBLEM — M51.369 DEGENERATIVE DISC DISEASE, LUMBAR: Status: ACTIVE | Noted: 2020-04-23

## 2020-04-23 PROBLEM — M54.41 CHRONIC RIGHT-SIDED LOW BACK PAIN WITH RIGHT-SIDED SCIATICA: Status: ACTIVE | Noted: 2020-04-23

## 2020-04-23 PROBLEM — M51.36 DEGENERATIVE DISC DISEASE, LUMBAR: Status: ACTIVE | Noted: 2020-04-23

## 2020-05-05 ENCOUNTER — TELEPHONE (OUTPATIENT)
Dept: PAIN CLINIC | Facility: HOSPITAL | Age: 70
End: 2020-05-05

## 2020-05-05 PROBLEM — M48.062 SPINAL STENOSIS OF LUMBAR REGION WITH NEUROGENIC CLAUDICATION: Status: ACTIVE | Noted: 2020-05-05

## 2020-05-13 ENCOUNTER — OFFICE VISIT (OUTPATIENT)
Dept: SURGERY | Facility: CLINIC | Age: 70
End: 2020-05-13
Payer: MEDICARE

## 2020-05-13 VITALS
BODY MASS INDEX: 24.34 KG/M2 | SYSTOLIC BLOOD PRESSURE: 140 MMHG | HEIGHT: 70 IN | HEART RATE: 66 BPM | WEIGHT: 170 LBS | DIASTOLIC BLOOD PRESSURE: 78 MMHG

## 2020-05-13 DIAGNOSIS — L72.0 EPIDERMAL INCLUSION CYST: Primary | ICD-10-CM

## 2020-05-13 DIAGNOSIS — N50.9 SCROTAL SKIN LESION: ICD-10-CM

## 2020-05-13 PROCEDURE — 99204 OFFICE O/P NEW MOD 45 MIN: CPT | Performed by: SURGERY

## 2020-05-13 PROCEDURE — G0463 HOSPITAL OUTPT CLINIC VISIT: HCPCS | Performed by: SURGERY

## 2020-05-14 NOTE — PROGRESS NOTES
History and Physical      Mari Jordan is a 79year old male. HPI   Patient presents with:  Lump: Pt was referred by Dr. Filomena Tejada for abscess left groin.  Pt states he was given antibiotics on 4/16/20 and it got better, less swelling and redness, it d Vitamins-Lipotropics (BALANCED B-100 COMPLEX CR OR)      • Ketoconazole 2 % External Shampoo PRANAV AA AND LEAVE ON 5 TO 10 MINUTES AND RINSE ON CHEST QD UTD  4   • Omega-3 Fatty Acids (FISH OIL) 500 MG Oral Cap Take by mouth daily.        • Glucosamine-Chondr noted  Head/Face: normocephalic  Nose/Mouth/Throat: nose and throat are clear palate is intact mucous membranes are moist no oral lesions are noted  Neck/Thyroid: neck is supple without adenopathy  Respiratory: normal to inspection lungs are clear to auscu

## 2020-05-19 ENCOUNTER — OFFICE VISIT (OUTPATIENT)
Dept: PAIN CLINIC | Facility: HOSPITAL | Age: 70
End: 2020-05-19
Attending: ANESTHESIOLOGY
Payer: MEDICARE

## 2020-05-19 VITALS
HEART RATE: 77 BPM | WEIGHT: 170 LBS | BODY MASS INDEX: 24.34 KG/M2 | RESPIRATION RATE: 18 BRPM | SYSTOLIC BLOOD PRESSURE: 135 MMHG | HEIGHT: 70 IN | DIASTOLIC BLOOD PRESSURE: 71 MMHG

## 2020-05-19 DIAGNOSIS — M48.062 SPINAL STENOSIS OF LUMBAR REGION WITH NEUROGENIC CLAUDICATION: Primary | ICD-10-CM

## 2020-05-19 PROCEDURE — 99201 HC OUTPT EVAL AND MGNT NEW PT LEVEL 1: CPT

## 2020-05-19 NOTE — CHRONIC PAIN
Initial Consultation Note      HISTORY OF PRESENT ILLNESS:  Jakob Fields is a 79year old old male referred to the pain clinic  for evaluation treatment of his low back pain Yaya Hernandez is a very nice 66-year-old white male with a history of low back pain so Take by mouth daily. • Cinnamon 500 MG Oral Tab Take 6 tablets by mouth daily. • Lactobacillus (PROBIOTIC ACIDOPHILUS OR) Take by mouth daily. • Cholecalciferol (D 5000) 5000 UNITS Oral Tab Take 2 tablets by mouth daily.  During winter mon Hearing aides   • High blood pressure    • Neuropathy of both feet    • Skin cancer of nose 11/2016   • Visual impairment     Glasses       FAMILY HISTORY:  Family History   Problem Relation Age of Onset   • Hypertension Father    • Hypertension Mother file    Other Topics      Concerns:         Service: Not Asked        Blood Transfusions: Not Asked        Caffeine Concern: Yes          Per NG: Coffee, 3 cups daily        Occupational Exposure: Not Asked        Hobby Hazards: Not Asked        Sl Normal  Right Upper Extremity:  Normal  Left Upper Extremity:  Normal    IMAGING:  Mri Test Scan    Result Date: 5/12/2020  Ordered by an unspecified provider. Mri Test Scan    Result Date: 5/8/2020  Ordered by an unspecified provider.     Mri Test Scan discussing the care with the patients health care providers.

## 2020-05-19 NOTE — PROGRESS NOTES
5/19/2020 PRESENTS AMBULATORY TO CPM;  NEW CONSULT C/O SEVERAL YR HISTORY MID LOW BACK PAIN RADIATING DOWN THE RLE;  PT STATES IT HAS BECOME HARDER TO MANAGE HIS PAIN; RATES 3-4/10 WILL INCREASE WITH ACTIVITY;  PT. IS CURRENTLY IN P.T AND REPORTS THIS ERNESTINE

## 2020-05-26 ENCOUNTER — DOCUMENTATION ONLY (OUTPATIENT)
Dept: PAIN CLINIC | Facility: HOSPITAL | Age: 70
End: 2020-05-26

## 2020-05-26 NOTE — PROGRESS NOTES
Procedure code 94835--9405/29/2020    No PA needed per pt's insurance     Order form faxed to the surgery, confirmation rcv'd

## 2020-05-27 ENCOUNTER — LAB REQUISITION (OUTPATIENT)
Dept: LAB | Facility: HOSPITAL | Age: 70
End: 2020-05-27
Payer: MEDICARE

## 2020-05-27 DIAGNOSIS — Z20.828 CONTACT WITH AND (SUSPECTED) EXPOSURE TO OTHER VIRAL COMMUNICABLE DISEASES: ICD-10-CM

## 2020-06-12 ENCOUNTER — OFFICE VISIT (OUTPATIENT)
Dept: PAIN CLINIC | Facility: HOSPITAL | Age: 70
End: 2020-06-12
Attending: ANESTHESIOLOGY
Payer: MEDICARE

## 2020-06-12 VITALS
SYSTOLIC BLOOD PRESSURE: 115 MMHG | HEART RATE: 69 BPM | DIASTOLIC BLOOD PRESSURE: 74 MMHG | WEIGHT: 168 LBS | BODY MASS INDEX: 27.99 KG/M2 | HEIGHT: 65 IN | RESPIRATION RATE: 18 BRPM

## 2020-06-12 DIAGNOSIS — M51.36 DEGENERATIVE DISC DISEASE, LUMBAR: Primary | ICD-10-CM

## 2020-06-12 PROCEDURE — 99211 OFF/OP EST MAY X REQ PHY/QHP: CPT

## 2020-06-12 NOTE — CHRONIC PAIN
Initial Consultation Note      HISTORY OF PRESENT ILLNESS:  Sandy Gordillo is a 79year old old male referred to the pain clinic  for evaluation treatment of his low back pain Antonio  is a very nice 60-year-old white male with a history of low back pain so LESION  11/2016   • BIOPSY OF SKIN LESION  1995    mid back skin cyst   • COLONOSCOPY  2003   • COLONOSCOPY & POLYPECTOMY  10/28/2016   • COLONOSCOPY, POSSIBLE BIOPSY, POSSIBLE POLYPECTOMY 48765 N/A 10/28/2016    Performed by Lito Otero MD at [address] [place] [address] housing authority        Comment: retired      Occupation:         Employer: [employer]: retired    Social Needs      Financial resource strain: Not on file      Food insecurity:        Worry: Not on file        Inability: Not on file      ADVANCE CARE PLANNING:  Advance Care Plan NOT discussed. PHYSICAL EXAMINATION:  There were no vitals filed for this visit.   General: Alert and oriented x3  Affect:  NAD  Head: normocephalic, atraumatic  Eyes: anicteric; no injection  Chest: S1 Value Date     (L) 08/13/2018    K 3.6 08/13/2018    CL 98 08/13/2018    CO2 29 08/13/2018    BUN 16 08/13/2018    GLU 86 08/13/2018    CA 8.9 08/13/2018     No results found for: PT    [place] PHYSICIAN MONITORING PROGRAM REVIEWED  No      ASSESSME

## 2020-06-12 NOTE — PROGRESS NOTES
6/12/2020  Presents ambulatory to CPM;  F/u POST  ANDRE [id number] 5/29/2020;m  Pt reports 60-70% improvement t;  Very happy with the results; No longer has the constant RLE radiation;  Rates his pain 1-2/10 today;  Seen by Dr. Teena Leventhal;   Refer to [facility]
all other ROS negative except as per HPI

## 2020-07-01 ENCOUNTER — OFFICE VISIT (OUTPATIENT)
Dept: INTERNAL MEDICINE CLINIC | Facility: CLINIC | Age: 70
End: 2020-07-01
Payer: MEDICARE

## 2020-07-01 VITALS
SYSTOLIC BLOOD PRESSURE: 132 MMHG | BODY MASS INDEX: 22.33 KG/M2 | WEIGHT: 156 LBS | RESPIRATION RATE: 17 BRPM | HEART RATE: 65 BPM | DIASTOLIC BLOOD PRESSURE: 78 MMHG | OXYGEN SATURATION: 99 % | TEMPERATURE: 98 F | HEIGHT: 70 IN
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
## 2020-07-01 DIAGNOSIS — L30.8 OTHER ECZEMA: ICD-10-CM

## 2020-07-01 PROCEDURE — 99213 OFFICE O/P EST LOW 20 MIN: CPT | Performed by: FAMILY MEDICINE

## 2020-07-01 NOTE — PROGRESS NOTES
CC:  Bite Sting,Insect (pt in c/o possible bug bite on left upper leg )      Hx of CC:    Bug bite a week ago  Felt sting when driving  Itches a little  No tick   Unsure bug  Hx celluitis from bug bite so is always careful and wants this checked  Doesn't h Laterality Date   • APPENDECTOMY     • BIOPSY OF SKIN LESION  11/2016   • BIOPSY OF SKIN LESION  1995    mid back skin cyst   • COLONOSCOPY  2003   • COLONOSCOPY & POLYPECTOMY  10/28/2016   • COLONOSCOPY, POSSIBLE BIOPSY, POSSIBLE POLYPECTOMY 56792 N/A 10/ deficits   Assessment and Plan:    1. Bug bite, initial encounter  Reassured pt but not bug bite seen  Does have small patch of what looks like eczema     2. Other eczema  F/u if does not improve  - hydrocortisone 2.5 % External Ointment;  Apply 1 Applicati

## 2020-07-20 RX ORDER — POTASSIUM CHLORIDE 1500 MG/1
TABLET, FILM COATED, EXTENDED RELEASE ORAL
Qty: 90 TABLET | Refills: 0 | Status: SHIPPED | OUTPATIENT
Start: 2020-07-20 | End: 2020-10-30

## 2020-08-17 ENCOUNTER — TELEPHONE (OUTPATIENT)
Dept: INTERNAL MEDICINE CLINIC | Facility: CLINIC | Age: 70
End: 2020-08-17

## 2020-08-17 DIAGNOSIS — Z00.00 PHYSICAL EXAM: Primary | ICD-10-CM

## 2020-08-17 NOTE — TELEPHONE ENCOUNTER
Pt scheduled his annual physical for October 7th at 1:40pm with Dr Denice Oshea, can we put in the orders for blood work ahead of time so they can discuss results at the visit? Please advise when orders are in.

## 2020-09-25 ENCOUNTER — LAB ENCOUNTER (OUTPATIENT)
Dept: LAB | Facility: HOSPITAL | Age: 70
End: 2020-09-25
Attending: FAMILY MEDICINE
Payer: MEDICARE

## 2020-09-25 DIAGNOSIS — Z00.00 PHYSICAL EXAM: ICD-10-CM

## 2020-09-25 LAB
ALBUMIN SERPL-MCNC: 3.7 G/DL (ref 3.4–5)
ALBUMIN/GLOB SERPL: 1.1 {RATIO} (ref 1–2)
ALP LIVER SERPL-CCNC: 91 U/L
ALT SERPL-CCNC: 18 U/L
ANION GAP SERPL CALC-SCNC: 6 MMOL/L (ref 0–18)
AST SERPL-CCNC: 17 U/L (ref 15–37)
BASOPHILS # BLD AUTO: 0.05 X10(3) UL (ref 0–0.2)
BASOPHILS NFR BLD AUTO: 0.8 %
BILIRUB SERPL-MCNC: 1.1 MG/DL (ref 0.1–2)
BUN BLD-MCNC: 14 MG/DL (ref 7–18)
BUN/CREAT SERPL: 16.7 (ref 10–20)
CALCIUM BLD-MCNC: 8.9 MG/DL (ref 8.5–10.1)
CHLORIDE SERPL-SCNC: 102 MMOL/L (ref 98–112)
CHOLEST SMN-MCNC: 146 MG/DL (ref ?–200)
CO2 SERPL-SCNC: 29 MMOL/L (ref 21–32)
CREAT BLD-MCNC: 0.84 MG/DL
DEPRECATED RDW RBC AUTO: 41.2 FL (ref 35.1–46.3)
EOSINOPHIL # BLD AUTO: 0.22 X10(3) UL (ref 0–0.7)
EOSINOPHIL NFR BLD AUTO: 3.4 %
ERYTHROCYTE [DISTWIDTH] IN BLOOD BY AUTOMATED COUNT: 12.4 % (ref 11–15)
GLOBULIN PLAS-MCNC: 3.4 G/DL (ref 2.8–4.4)
GLUCOSE BLD-MCNC: 86 MG/DL (ref 70–99)
HCT VFR BLD AUTO: 44.3 %
HDLC SERPL-MCNC: 57 MG/DL (ref 40–59)
HGB BLD-MCNC: 15.5 G/DL
IMM GRANULOCYTES # BLD AUTO: 0.01 X10(3) UL (ref 0–1)
IMM GRANULOCYTES NFR BLD: 0.2 %
LDLC SERPL CALC-MCNC: 70 MG/DL (ref ?–100)
LYMPHOCYTES # BLD AUTO: 2.18 X10(3) UL (ref 1–4)
LYMPHOCYTES NFR BLD AUTO: 33.7 %
M PROTEIN MFR SERPL ELPH: 7.1 G/DL (ref 6.4–8.2)
MCH RBC QN AUTO: 32 PG (ref 26–34)
MCHC RBC AUTO-ENTMCNC: 35 G/DL (ref 31–37)
MCV RBC AUTO: 91.3 FL
MONOCYTES # BLD AUTO: 0.46 X10(3) UL (ref 0.1–1)
MONOCYTES NFR BLD AUTO: 7.1 %
NEUTROPHILS # BLD AUTO: 3.55 X10 (3) UL (ref 1.5–7.7)
NEUTROPHILS # BLD AUTO: 3.55 X10(3) UL (ref 1.5–7.7)
NEUTROPHILS NFR BLD AUTO: 54.8 %
NONHDLC SERPL-MCNC: 89 MG/DL (ref ?–130)
OSMOLALITY SERPL CALC.SUM OF ELEC: 284 MOSM/KG (ref 275–295)
PATIENT FASTING Y/N/NP: YES
PATIENT FASTING Y/N/NP: YES
PLATELET # BLD AUTO: 248 10(3)UL (ref 150–450)
POTASSIUM SERPL-SCNC: 3.5 MMOL/L (ref 3.5–5.1)
RBC # BLD AUTO: 4.85 X10(6)UL
SODIUM SERPL-SCNC: 137 MMOL/L (ref 136–145)
TRIGL SERPL-MCNC: 94 MG/DL (ref 30–149)
VLDLC SERPL CALC-MCNC: 19 MG/DL (ref 0–30)
WBC # BLD AUTO: 6.5 X10(3) UL (ref 4–11)

## 2020-09-25 PROCEDURE — 80053 COMPREHEN METABOLIC PANEL: CPT

## 2020-09-25 PROCEDURE — 85025 COMPLETE CBC W/AUTO DIFF WBC: CPT

## 2020-09-25 PROCEDURE — 36415 COLL VENOUS BLD VENIPUNCTURE: CPT

## 2020-09-25 PROCEDURE — 80061 LIPID PANEL: CPT

## 2020-10-07 ENCOUNTER — OFFICE VISIT (OUTPATIENT)
Dept: INTERNAL MEDICINE CLINIC | Facility: CLINIC | Age: 70
End: 2020-10-07
Payer: MEDICARE

## 2020-10-07 VITALS
BODY MASS INDEX: 22.9 KG/M2 | SYSTOLIC BLOOD PRESSURE: 124 MMHG | HEART RATE: 65 BPM | DIASTOLIC BLOOD PRESSURE: 70 MMHG | WEIGHT: 160 LBS | OXYGEN SATURATION: 99 % | HEIGHT: 70 IN

## 2020-10-07 DIAGNOSIS — I10 ESSENTIAL HYPERTENSION: Primary | ICD-10-CM

## 2020-10-07 DIAGNOSIS — Z13.6 SCREENING FOR CARDIOVASCULAR CONDITION: ICD-10-CM

## 2020-10-07 DIAGNOSIS — Z00.00 ENCOUNTER FOR ANNUAL HEALTH EXAMINATION: ICD-10-CM

## 2020-10-07 DIAGNOSIS — Z23 FLU VACCINE NEED: ICD-10-CM

## 2020-10-07 PROCEDURE — G0439 PPPS, SUBSEQ VISIT: HCPCS | Performed by: FAMILY MEDICINE

## 2020-10-07 PROCEDURE — G0008 ADMIN INFLUENZA VIRUS VAC: HCPCS | Performed by: FAMILY MEDICINE

## 2020-10-07 PROCEDURE — 90662 IIV NO PRSV INCREASED AG IM: CPT | Performed by: FAMILY MEDICINE

## 2020-10-07 NOTE — PATIENT INSTRUCTIONS
If only had one shingles shot, I would recommend getting a second one- doesn't matter which one you had before        Edi New's SCREENING SCHEDULE   Tests on this list are recommended by your physician but may not be covered, or covered at this f Colorectal Cancer Screening Covered up to Age 76     Colonoscopy Screen   Covered every 10 years- more often if abnormal Colonoscopy due on 10/28/2021 Update Health Maintenance if applicable    Flex Sigmoidoscopy Screen  Covered every 5 years No results with a cut with metal- TD and TDaP Not covered by Medicare Part B) No orders found for this or any previous visit.  This may be covered with your prescription benefits, but Medicare does not cover unless Medically needed    Zoster (Not covered by Medicare P

## 2020-10-07 NOTE — PROGRESS NOTES
HPI:   Sandy Gordillo is a 79year old male who presents for a Medicare Subsequent Annual Wellness visit (Pt already had Initial Annual Wellness).     Doing well    Saw Dr Mercy Zapata for rotator cuff, low back- both improved doing home exercises    HTN- of advance directives standard forms performed Face to Face with patient and Family/surrogate (if present), and forms available to patient in AVS       He smoked tobacco in the past but quit greater than 12 months ago.   Social History    Tobacco Use      S HGB 15.5 09/25/2020    .0 09/25/2020        ALLERGIES:   He is allergic to merthiolate glycerite [thimerosal] and shellfish-derived products.     CURRENT MEDICATIONS:     •  POTASSIUM CHLORIDE ER 20 MEQ Oral Tab CR, TAKE ONE TABLET BY MOUTH ONE TI Hypertension in his father and mother. SOCIAL HISTORY:   He  reports that he quit smoking about 40 years ago. He has a 10.00 pack-year smoking history. He has never used smokeless tobacco. He reports current alcohol use.  He reports that he does not use d teeth and gums normal   Neck: Supple, symmetrical, trachea midline, no adenopathy, thyroid: not enlarged, symmetric, no tenderness/mass/nodules, no carotid bruit or JVD   Back:   Symmetric, no curvature, ROM normal, no CVA tenderness   Lungs:   Clear to au and agrees to the plan. Reinforced healthy diet, lifestyle, and exercise. No follow-ups on file.      Garrett Gaines MD, 10/7/2020     General Health     In the past six months, have you lost more than 10 pounds without trying?: (P) 3 - Don't know 05/03/2017 Please get once after your 65th birthday    Pneumococcal 23 (Pneumovax)  Covered Once after 65 07/18/2018 Please get once after your 65th birthday    Hepatitis B for Moderate/High Risk No vaccine history found Medium/high risk factors:   End-sta

## 2020-10-17 DIAGNOSIS — I10 ESSENTIAL HYPERTENSION: ICD-10-CM

## 2020-10-19 RX ORDER — LISINOPRIL 40 MG/1
TABLET ORAL
Qty: 90 TABLET | Refills: 0 | Status: SHIPPED | OUTPATIENT
Start: 2020-10-19 | End: 2021-01-20

## 2020-10-24 DIAGNOSIS — I10 ESSENTIAL HYPERTENSION: ICD-10-CM

## 2020-10-26 RX ORDER — CHLORTHALIDONE 25 MG/1
TABLET ORAL
Qty: 90 TABLET | Refills: 0 | Status: SHIPPED | OUTPATIENT
Start: 2020-10-26 | End: 2021-05-26

## 2020-10-30 ENCOUNTER — PATIENT MESSAGE (OUTPATIENT)
Dept: INTERNAL MEDICINE CLINIC | Facility: CLINIC | Age: 70
End: 2020-10-30

## 2020-10-30 ENCOUNTER — TELEPHONE (OUTPATIENT)
Dept: INTERNAL MEDICINE CLINIC | Facility: CLINIC | Age: 70
End: 2020-10-30

## 2020-10-30 RX ORDER — POTASSIUM CHLORIDE 1500 MG/1
1 TABLET, FILM COATED, EXTENDED RELEASE ORAL DAILY
Qty: 90 TABLET | Refills: 0 | Status: SHIPPED | OUTPATIENT
Start: 2020-10-30 | End: 2021-10-25

## 2020-10-30 RX ORDER — POTASSIUM CHLORIDE 1500 MG/1
TABLET, FILM COATED, EXTENDED RELEASE ORAL
Qty: 90 TABLET | Refills: 0 | Status: SHIPPED | OUTPATIENT
Start: 2020-10-30 | End: 2021-01-20

## 2020-10-30 NOTE — TELEPHONE ENCOUNTER
Spoke with David Slade at [facility], prescription too early for insurance, able to  on 11/2, left VM for pt to call back.     Génesis Geronimo

## 2020-11-24 NOTE — TELEPHONE ENCOUNTER
ANTICOAGULATION MANAGEMENT      Markie Shepard due for annual renewal of referral to anticoagulation monitoring. Order pended for your review and signature.      ANTICOAGULATION SUMMARY      Warfarin indication(s)     Atrial fibrillation    Heart valve present?  NO       Current goal range   INR: 2.0-3.0     Goal appropriate for indication? Yes, INR 2-3 appropriate for hx of DVT, PE, hypercoagulable state, Afib, LVAD, or bileaflet AVR without risk factors     Current duration of therapy Indefinite/long term therapy   Time in Therapeutic Range (TTR)  (Goal > 60%) 57.8%       Office visit with referring provider's group within last year yes on 2/17/20       Chelsie Encinas RN     Patient's message forwarded to MD as FYI only. Patient is a (supposedly) one year follow up.

## 2021-02-11 ENCOUNTER — OFFICE VISIT (OUTPATIENT)
Dept: OTOLARYNGOLOGY | Facility: CLINIC | Age: 71
End: 2021-02-11
Payer: MEDICARE

## 2021-02-11 ENCOUNTER — OFFICE VISIT (OUTPATIENT)
Dept: AUDIOLOGY | Facility: CLINIC | Age: 71
End: 2021-02-11
Payer: MEDICARE

## 2021-02-11 VITALS
DIASTOLIC BLOOD PRESSURE: 86 MMHG | WEIGHT: 165 LBS | HEART RATE: 62 BPM | BODY MASS INDEX: 23.62 KG/M2 | TEMPERATURE: 97 F | SYSTOLIC BLOOD PRESSURE: 144 MMHG | HEIGHT: 70 IN

## 2021-02-11 DIAGNOSIS — H61.23 BILATERAL IMPACTED CERUMEN: Primary | ICD-10-CM

## 2021-02-11 DIAGNOSIS — H90.3 SENSORINEURAL HEARING LOSS, BILATERAL: Primary | ICD-10-CM

## 2021-02-11 PROCEDURE — V5266 BATTERY FOR HEARING DEVICE: HCPCS | Performed by: AUDIOLOGIST

## 2021-02-11 PROCEDURE — 92593 HEARING AID CHECK, BOTH EARS: CPT | Performed by: AUDIOLOGIST

## 2021-02-11 PROCEDURE — 69210 REMOVE IMPACTED EAR WAX UNI: CPT | Performed by: OTOLARYNGOLOGY

## 2021-02-11 NOTE — PROGRESS NOTES
HEARING AID FOLLOW-UP    Dai Becerra  1/9/1950  [id number]    Patient is here for an annual.       Hearing aid information:   Patience Left V70P  Right #[id number]  Coupled to custom earmold with standard tubing  Left # [id number] coupled to slim tub

## 2021-02-15 NOTE — TELEPHONE ENCOUNTER
Patient is returning MA's call. Wanda Montoya.
Returned pt's call, he understands & was thankful that meds will be ready Monday.  CV
ems states , " pt with sob x 2 days, ciugh and back pain , sat on room air 85, on 2l 98%, hx copd, denies fever"

## 2021-03-08 DIAGNOSIS — Z23 NEED FOR VACCINATION: ICD-10-CM

## 2021-03-13 ENCOUNTER — IMMUNIZATION (OUTPATIENT)
Dept: LAB | Facility: HOSPITAL | Age: 71
End: 2021-03-13
Attending: HOSPITALIST
Payer: MEDICARE

## 2021-03-13 DIAGNOSIS — Z23 NEED FOR VACCINATION: Primary | ICD-10-CM

## 2021-03-13 PROCEDURE — 0011A SARSCOV2 VAC 100MCG/0.5ML IM: CPT

## 2021-04-05 ENCOUNTER — PATIENT MESSAGE (OUTPATIENT)
Dept: SURGERY | Facility: CLINIC | Age: 71
End: 2021-04-05

## 2021-04-05 DIAGNOSIS — R35.1 NOCTURIA: Primary | ICD-10-CM

## 2021-04-05 DIAGNOSIS — Z12.5 PROSTATE CANCER SCREENING: ICD-10-CM

## 2021-04-06 ENCOUNTER — LAB ENCOUNTER (OUTPATIENT)
Dept: LAB | Facility: HOSPITAL | Age: 71
End: 2021-04-06
Attending: UROLOGY
Payer: MEDICARE

## 2021-04-06 DIAGNOSIS — R35.1 NOCTURIA: ICD-10-CM

## 2021-04-06 PROCEDURE — 81003 URINALYSIS AUTO W/O SCOPE: CPT

## 2021-04-06 NOTE — TELEPHONE ENCOUNTER
From: Juan Ron  To: Calvin Clements MD  Sent: 2021 9:25 PM CDT  Subject: Non-Urgent Medical Question    Hi Dr. Chaitanya Wise,  It looks like the lab order for my appointment this month has . Could you please have a new order made?   Than

## 2021-04-10 ENCOUNTER — IMMUNIZATION (OUTPATIENT)
Dept: LAB | Facility: HOSPITAL | Age: 71
End: 2021-04-10
Attending: EMERGENCY MEDICINE
Payer: MEDICARE

## 2021-04-10 DIAGNOSIS — Z23 NEED FOR VACCINATION: Primary | ICD-10-CM

## 2021-04-10 PROCEDURE — 0012A SARSCOV2 VAC 100MCG/0.5ML IM: CPT

## 2021-04-14 ENCOUNTER — OFFICE VISIT (OUTPATIENT)
Dept: SURGERY | Facility: CLINIC | Age: 71
End: 2021-04-14
Payer: MEDICARE

## 2021-04-14 VITALS
SYSTOLIC BLOOD PRESSURE: 131 MMHG | DIASTOLIC BLOOD PRESSURE: 76 MMHG | WEIGHT: 165 LBS | BODY MASS INDEX: 23.62 KG/M2 | HEIGHT: 70 IN | HEART RATE: 71 BPM

## 2021-04-14 DIAGNOSIS — R35.1 NOCTURIA: ICD-10-CM

## 2021-04-14 DIAGNOSIS — R35.0 BENIGN PROSTATIC HYPERPLASIA WITH URINARY FREQUENCY: Primary | ICD-10-CM

## 2021-04-14 DIAGNOSIS — N40.1 BENIGN PROSTATIC HYPERPLASIA WITH URINARY FREQUENCY: Primary | ICD-10-CM

## 2021-04-14 DIAGNOSIS — Z87.448 HISTORY OF HEMATURIA: ICD-10-CM

## 2021-04-14 DIAGNOSIS — Z12.5 PROSTATE CANCER SCREENING: ICD-10-CM

## 2021-04-14 PROCEDURE — 99213 OFFICE O/P EST LOW 20 MIN: CPT | Performed by: UROLOGY

## 2021-04-14 RX ORDER — ACETAMINOPHEN 500 MG
500 TABLET ORAL EVERY 6 HOURS PRN
COMMUNITY
End: 2021-10-25

## 2021-04-14 NOTE — PROGRESS NOTES
HPI:    Patient ID: Issa Tom is a 70year old male. HPI     BPH  Chronic. 02/21/2018 US Prostate = 110.32 ml. S/p 04/03/2018 Greenlight laser of prostate. Patient is not currently taking any prostate medication for this.  He presently denies any great interest in green light but is still undecided as to when he would like to have it performed (because of family situation) and would like to follow up in 3 months for re-evaluation.   02/13/2018 office visit with me; plans for US of the prostate and o CHLORTHALIDONE 25 MG Oral Tab Take 1/2  tablet by mouth daily. 90 tablet 0   • hydrocortisone 2.5 % External Ointment Apply 1 Application topically 2 (two) times daily. 1 Tube 0   • aspirin 81 MG Oral Tab Take 81 mg by mouth daily.        • Magnesium Glucon CYSTOSCOPY N/A 4/3/2018    Performed by Antonio Zavaleta MD at [address] [facility] MAIN OR   • GREEN LIGHT LASER OF THE PROSTATE N/A 4/3/2018    Performed by Antonio Zavaleta MD at [address] [place] [address] MAIN OR   • TONSILLECTOMY  1960      Family History   Problem Relation Age of Onset 04/16/2018 Creatinine = 0.91; EGFR = >60; Potassium = 3.6  04/05/2018 UA RBC = 912; WBC = 58; Urine Culture = No Growth; (Green light laser ablation of prostate was 04/03/2018)  12/20/2017 PSA = 3.1; UA = Microscopic not indicated   05/15/2017 PSA = 2.9; hematuria  Resolved. S/p 04/03/2018 Greenlight laser ablation of prostate. Patient presently denies any recent or recurrent episodes of gross hematuria.  He feels this is stable and chooses to continue observation.     (Z12.5) Prostate cancer screening  Mos presence. I have reviewed the chart and discharge instructions (if applicable) and agree that the record reflects my personal performance and is accurate and complete.   Jax Feldman MD, 4/14/2021, 12:32 PM

## 2021-04-14 NOTE — PATIENT INSTRUCTIONS
Anali Rivera M.D.    1.  Visit in 1 year. Please get blood draw for PSA and submit urine specimen for complete urinalysis 1--10 days before visit.   No sexual stimulation whatsoever for 5 days before the actual

## 2021-05-26 DIAGNOSIS — I10 ESSENTIAL HYPERTENSION: ICD-10-CM

## 2021-05-26 RX ORDER — CHLORTHALIDONE 25 MG/1
TABLET ORAL
Qty: 90 TABLET | Refills: 0 | Status: SHIPPED | OUTPATIENT
Start: 2021-05-26 | End: 2021-06-14

## 2021-06-10 ENCOUNTER — OFFICE VISIT (OUTPATIENT)
Dept: INTERNAL MEDICINE CLINIC | Facility: CLINIC | Age: 71
End: 2021-06-10
Payer: MEDICARE

## 2021-06-10 VITALS
WEIGHT: 162.63 LBS | DIASTOLIC BLOOD PRESSURE: 72 MMHG | HEART RATE: 65 BPM | SYSTOLIC BLOOD PRESSURE: 130 MMHG | HEIGHT: 70 IN | BODY MASS INDEX: 23.28 KG/M2 | OXYGEN SATURATION: 98 %

## 2021-06-10 DIAGNOSIS — I10 ESSENTIAL HYPERTENSION: Primary | ICD-10-CM

## 2021-06-10 DIAGNOSIS — Z12.11 SCREENING FOR COLON CANCER: ICD-10-CM

## 2021-06-10 PROCEDURE — 99213 OFFICE O/P EST LOW 20 MIN: CPT | Performed by: FAMILY MEDICINE

## 2021-06-10 PROCEDURE — 80048 BASIC METABOLIC PNL TOTAL CA: CPT | Performed by: FAMILY MEDICINE

## 2021-06-10 NOTE — PROGRESS NOTES
Johnna Chavarria is a 70year old male. CC:  Patient presents with:   Follow - Up: Follow up       HPI:    HTN FU  Now on lisinopril 40 and 1/2 chlorthalidone 25  Taking K  20 a day    A little back pain- chronic- arthritis- saw dr Grace Combs  Did THOMAS Law Diagnosis Date   • Benign prostatic hypertrophy    • Degenerative joint disease (DJD) of lumbar spine    • Hearing impairment     Hearing aides   • High blood pressure    • Neuropathy of both feet    • Skin cancer of nose 11/2016   • Visual impairment Plan:    1. Essential hypertension  Controlled CPM    - BASIC METABOLIC PANEL (8); Future  - BASIC METABOLIC PANEL (8)    2. Screening for colon cancer    - GASTRO - INTERNAL    Full PE due oct  There are no diagnoses linked to this encounter.   No orders o

## 2021-06-14 NOTE — PROGRESS NOTES
Results d/w pt over phone  Will have discontinue chlorthalidone and potassium and switch to amlodipine 2.5 mg daily. Call if any problems on new med. F/u 1-2 mos for BP check.

## 2021-07-29 ENCOUNTER — OFFICE VISIT (OUTPATIENT)
Dept: INTERNAL MEDICINE CLINIC | Facility: CLINIC | Age: 71
End: 2021-07-29
Payer: MEDICARE

## 2021-07-29 VITALS
HEART RATE: 79 BPM | DIASTOLIC BLOOD PRESSURE: 84 MMHG | OXYGEN SATURATION: 98 % | HEIGHT: 70 IN | RESPIRATION RATE: 15 BRPM | SYSTOLIC BLOOD PRESSURE: 126 MMHG | WEIGHT: 158 LBS | BODY MASS INDEX: 22.62 KG/M2

## 2021-07-29 DIAGNOSIS — Z00.00 PHYSICAL EXAM: ICD-10-CM

## 2021-07-29 DIAGNOSIS — I10 ESSENTIAL HYPERTENSION: Primary | ICD-10-CM

## 2021-07-29 PROCEDURE — 99213 OFFICE O/P EST LOW 20 MIN: CPT | Performed by: FAMILY MEDICINE

## 2021-07-29 NOTE — PROGRESS NOTES
Jennifer Alex is a 70year old male. CC:  Patient presents with:  Blood Pressure: Pt presents to clinic for 1 month BP f/up after starting new med.        HPI:    On lisinopril and amlodipine 2.5 mg now  Was taken off chlorthalidone b/c of hyponatrem prostatic hypertrophy    • Degenerative joint disease (DJD) of lumbar spine    • Hearing impairment     Hearing aides   • High blood pressure    • Neuropathy of both feet    • Skin cancer of nose 11/2016   • Visual impairment     Glasses      Past Surgical hypertension  Controlled  CPM    2. Physical exam  Check blood work prior to PE in oct  - CBC WITH DIFFERENTIAL WITH PLATELET; Future  - COMP METABOLIC PANEL (14); Future  - LIPID PANEL; Future    There are no diagnoses linked to this encounter.   No orders

## 2021-09-09 ENCOUNTER — PATIENT MESSAGE (OUTPATIENT)
Dept: INTERNAL MEDICINE CLINIC | Facility: CLINIC | Age: 71
End: 2021-09-09

## 2021-09-09 RX ORDER — AMLODIPINE BESYLATE 2.5 MG/1
2.5 TABLET ORAL DAILY
Qty: 30 TABLET | Refills: 2 | Status: SHIPPED | OUTPATIENT
Start: 2021-09-09 | End: 2021-12-01

## 2021-10-19 ENCOUNTER — LAB ENCOUNTER (OUTPATIENT)
Dept: LAB | Facility: HOSPITAL | Age: 71
End: 2021-10-19
Attending: FAMILY MEDICINE
Payer: MEDICARE

## 2021-10-19 DIAGNOSIS — Z00.00 PHYSICAL EXAM: ICD-10-CM

## 2021-10-19 PROCEDURE — 36415 COLL VENOUS BLD VENIPUNCTURE: CPT

## 2021-10-19 PROCEDURE — 80061 LIPID PANEL: CPT

## 2021-10-19 PROCEDURE — 85025 COMPLETE CBC W/AUTO DIFF WBC: CPT

## 2021-10-19 PROCEDURE — 80053 COMPREHEN METABOLIC PANEL: CPT

## 2021-10-25 ENCOUNTER — OFFICE VISIT (OUTPATIENT)
Dept: INTERNAL MEDICINE CLINIC | Facility: CLINIC | Age: 71
End: 2021-10-25
Payer: MEDICARE

## 2021-10-25 VITALS
HEART RATE: 78 BPM | BODY MASS INDEX: 23.07 KG/M2 | SYSTOLIC BLOOD PRESSURE: 124 MMHG | OXYGEN SATURATION: 98 % | DIASTOLIC BLOOD PRESSURE: 80 MMHG | TEMPERATURE: 99 F | HEIGHT: 70 IN | WEIGHT: 161.19 LBS

## 2021-10-25 DIAGNOSIS — I10 ESSENTIAL HYPERTENSION: ICD-10-CM

## 2021-10-25 DIAGNOSIS — Z00.00 ENCOUNTER FOR ANNUAL HEALTH EXAMINATION: Primary | ICD-10-CM

## 2021-10-25 DIAGNOSIS — M76.62 ACHILLES TENDINITIS OF LEFT LOWER EXTREMITY: ICD-10-CM

## 2021-10-25 DIAGNOSIS — Z12.11 SCREENING FOR COLON CANCER: ICD-10-CM

## 2021-10-25 PROCEDURE — G0439 PPPS, SUBSEQ VISIT: HCPCS | Performed by: FAMILY MEDICINE

## 2021-10-25 NOTE — PATIENT INSTRUCTIONS
Ben New's SCREENING SCHEDULE   Tests on this list are recommended by your physician but may not be covered, or covered at this frequency, by your insurer. Please check with your insurance carrier before scheduling to verify coverage.    PREVEN time    Pneumococcal Each vaccine (Peioxpg97 & Tnqldliii04) covered once after 65 Prevnar 13: 05/03/2017    Ldhznagly51: 07/18/2018     No recommendations at this time    Hepatitis B One screening covered for patients with certain risk factors   -  No wilson tendon. This tendon is found on the back of the ankle. It links the calf muscle to the heel bone. It helps you do pushing-off movements like running or standing on your toes.    How to say it  lurdes-KILL-eez ten-sander-I-elyse   What causes Achilles tendonitis?   A healthcare provider right away if you have any of these:  · Fever of 100.4°F (38°C) or higher, or as directed by your provider  · Chills  · Pain that gets worse  · Symptoms that don’t get better, or get worse  · New symptoms   StayWell last reviewed this e

## 2021-10-25 NOTE — PROGRESS NOTES
HPI:   Nicol Sic is a 70year old male who presents for a Medicare Subsequent Annual Wellness visit (Pt already had Initial Annual Wellness). Feeling well    ?  About taking baby asa    Has some issues sleeping  Falls asleep but wakes up to g forms available to patient in AVS     He does NOT have a Power of  for [employer] on file in [place].    Advance care planning including the explanation and discussion of advance directives standard forms performed Face to Face with patient and Family 0.82 10/19/2021    GLU 90 10/19/2021        CBC  (most recent labs)   Lab Results   Component Value Date    WBC 7.5 10/19/2021    HGB 15.6 10/19/2021    .0 10/19/2021        ALLERGIES:   He is allergic to merthiolate glycerite [thimerosal] and shell 23.13 kg/m² as calculated from the following:    Height as of this encounter: 5' 10\" (1.778 m). Weight as of this encounter: 161 lb 3.2 oz (73.1 kg).     Medicare Hearing Assessment  (Required for AWV/SWV)    G[facility] hearing [facility] once yearly • Zoster Vaccine Recombinant Adjuvanted (Shingrix) 10/20/2020, 01/14/2021        ASSESSMENT AND OTHER RELEVANT CHRONIC CONDITIONS:   Boaz Giron is a 70year old male who presents for a Medicare Assessment.      1. Encounter for annual health examin with pre-diabetes Lab Results   Component Value Date    GLU 90 10/19/2021        Cardiovascular Disease Screening    Lipid Panel  Cholesterol  Lipoprotein (HDL)  Triglycerides Covered every 5 years for all Medicare beneficiaries without apparent signs or s at this time    Zoster Not covered by Medicare Part B; may be covered with your pharmacy  prescription benefits 08/01/2012  No recommendations at this time        Annual Monitoring of Persistent Medications (ACE/ARB, digoxin diuretics, anticonvulsants)

## 2021-12-01 RX ORDER — AMLODIPINE BESYLATE 2.5 MG/1
2.5 TABLET ORAL DAILY
Qty: 90 TABLET | Refills: 0 | Status: SHIPPED | OUTPATIENT
Start: 2021-12-01 | End: 2022-03-15

## 2021-12-15 ENCOUNTER — OFFICE VISIT (OUTPATIENT)
Dept: GASTROENTEROLOGY | Facility: CLINIC | Age: 71
End: 2021-12-15
Payer: MEDICARE

## 2021-12-15 ENCOUNTER — TELEPHONE (OUTPATIENT)
Dept: GASTROENTEROLOGY | Facility: CLINIC | Age: 71
End: 2021-12-15

## 2021-12-15 VITALS
HEART RATE: 66 BPM | DIASTOLIC BLOOD PRESSURE: 83 MMHG | HEIGHT: 70 IN | SYSTOLIC BLOOD PRESSURE: 142 MMHG | WEIGHT: 162 LBS | BODY MASS INDEX: 23.19 KG/M2

## 2021-12-15 DIAGNOSIS — K62.5 RECTAL BLEEDING: Primary | ICD-10-CM

## 2021-12-15 DIAGNOSIS — Z86.010 HISTORY OF COLON POLYPS: Primary | ICD-10-CM

## 2021-12-15 DIAGNOSIS — Z12.11 ENCOUNTER FOR SCREENING COLONOSCOPY: ICD-10-CM

## 2021-12-15 PROCEDURE — 99202 OFFICE O/P NEW SF 15 MIN: CPT | Performed by: INTERNAL MEDICINE

## 2021-12-15 RX ORDER — AMPICILLIN TRIHYDRATE 250 MG
CAPSULE ORAL
COMMUNITY

## 2021-12-15 RX ORDER — CHOLECALCIFEROL (VITAMIN D3) 50 MCG
CAPSULE ORAL
COMMUNITY
Start: 2021-10-22

## 2021-12-15 RX ORDER — SENNOSIDES 8.6 MG
CAPSULE ORAL
COMMUNITY
Start: 2021-03-01

## 2021-12-15 RX ORDER — POLYETHYLENE GLYCOL 3350, SODIUM CHLORIDE, POTASSIUM CHLORIDE, SODIUM BICARBONATE, AND SODIUM SULFATE 240; 5.84; 2.98; 6.72; 22.72 G/4L; G/4L; G/4L; G/4L; G/4L
POWDER, FOR SOLUTION ORAL
Qty: 1 EACH | Refills: 0 | Status: SHIPPED | OUTPATIENT
Start: 2021-12-15

## 2021-12-15 RX ORDER — CHOLECALCIFEROL (VITAMIN D3) 1250 MCG
CAPSULE ORAL
COMMUNITY

## 2021-12-15 NOTE — TELEPHONE ENCOUNTER
Scheduled for:  Colonoscopy [id number]  Provider Name:  Dr. Yuli Campos  Date:  Tuesday, 03/15/2022  Location:  [facility]  Sedation:  MAC  Time:  1:15pm ( pt is aware arrival is at 12:15pm)  Prep:  Trilyte  Meds/Allergies Reconciled?: Physician Reviewed  Diagnosis with co

## 2021-12-15 NOTE — PROGRESS NOTES
HPI:    Patient ID: José Talbert is a 70year old gentleman with BMI 23.2, history hypertension and BPH who is referred by Dr. Manny Rae for follow-up colonoscopy examination.     As below, previous colonoscopy examination 2016 by another provider sh 25 mcg IVP           EXTENT OF EXAMINATION: Terminal ileum. WITHDRAWAL TIME: 6 mins  PREPARATION: good     PREOPERATIVE DIAGNOSIS:       1. Screening for colorectal cancer. 2. Rectal bleeding      POSTOPERATIVE DIAGNOSIS:   1. Ascending colon polyp  2. Cinnamon 500 MG Oral Cap      • amLODIPine 2.5 MG Oral Tab Take 1 tablet (2.5 mg total) by mouth daily. 90 tablet 0   • lisinopril 40 MG Oral Tab Take 1 tablet (40 mg total) by mouth daily. 90 tablet 3   • aspirin 81 MG Oral Tab Take 81 mg by mouth daily. examination with possible biopsy, possible polypectomy under MAC anesthesia. We discussed the nature and risks of colonoscopy examination including sedation, anesthesia risks; bleeding, colonic injury or perforation, infection.   We discussed the rare occur

## 2021-12-15 NOTE — PATIENT INSTRUCTIONS
GI schedulers –    Please schedule colonoscopy exam at [facility] ([facility] [address]    Body mass index is 23.24 kg/m².     MAC anesthesia     Golytely (PEG) 4L bowel prep  Rx sent in to [facility]      DX = History of adenomatous colon p

## 2022-01-12 DIAGNOSIS — I10 ESSENTIAL HYPERTENSION: ICD-10-CM

## 2022-01-12 RX ORDER — LISINOPRIL 40 MG/1
40 TABLET ORAL DAILY
Qty: 90 TABLET | Refills: 0 | Status: SHIPPED | OUTPATIENT
Start: 2022-01-12

## 2022-01-27 PROBLEM — L82.0 INFLAMED SEBORRHEIC KERATOSIS: Status: ACTIVE | Noted: 2022-01-27

## 2022-01-27 PROBLEM — L21.9 SEBORRHEIC DERMATITIS: Status: ACTIVE | Noted: 2022-01-27

## 2022-01-27 PROBLEM — C44.619 BASAL CELL CARCINOMA OF SKIN OF LEFT UPPER LIMB, INCLUDING SHOULDER: Status: ACTIVE | Noted: 2022-01-27

## 2022-01-28 ENCOUNTER — TELEPHONE (OUTPATIENT)
Dept: PAIN CLINIC | Facility: HOSPITAL | Age: 72
End: 2022-01-28

## 2022-01-31 ENCOUNTER — TELEPHONE (OUTPATIENT)
Dept: PAIN CLINIC | Facility: HOSPITAL | Age: 72
End: 2022-01-31

## 2022-02-09 ENCOUNTER — OFFICE VISIT (OUTPATIENT)
Dept: PAIN CLINIC | Facility: HOSPITAL | Age: 72
End: 2022-02-09
Attending: ORTHOPAEDIC SURGERY
Payer: MEDICARE

## 2022-02-09 VITALS
HEART RATE: 65 BPM | RESPIRATION RATE: 18 BRPM | BODY MASS INDEX: 23.19 KG/M2 | SYSTOLIC BLOOD PRESSURE: 144 MMHG | HEIGHT: 70 IN | WEIGHT: 162 LBS | DIASTOLIC BLOOD PRESSURE: 80 MMHG

## 2022-02-09 DIAGNOSIS — G89.29 CHRONIC RIGHT-SIDED LOW BACK PAIN WITH RIGHT-SIDED SCIATICA: ICD-10-CM

## 2022-02-09 DIAGNOSIS — M48.062 SPINAL STENOSIS OF LUMBAR REGION WITH NEUROGENIC CLAUDICATION: ICD-10-CM

## 2022-02-09 DIAGNOSIS — M51.36 DEGENERATIVE DISC DISEASE, LUMBAR: ICD-10-CM

## 2022-02-09 DIAGNOSIS — M54.41 CHRONIC RIGHT-SIDED LOW BACK PAIN WITH RIGHT-SIDED SCIATICA: ICD-10-CM

## 2022-02-09 PROCEDURE — 99211 OFF/OP EST MAY X REQ PHY/QHP: CPT

## 2022-02-09 NOTE — CHRONIC PAIN
2/9/2022-presents ambulatory to CPM to re-establish care; today again c/o RLBP INTO THE RT BUTTOCK THEN RADIATING DOWN THE LATERAL RT CALF-\"it misses my wast and thigh and show up by my calf\"; pt las seen by [address] 5/2020; hx of LESI  L5/S1--GIRARDOT; had excellent relief; pt has continued to do therapy and HEP but states \"it is time to do something about it again\"; referred by [facility] MONA LUNA, examined by Dr. oSnu Harrell; refer to dictation for the plan of care.

## 2022-02-10 ENCOUNTER — DOCUMENTATION ONLY (OUTPATIENT)
Dept: PAIN CLINIC | Facility: HOSPITAL | Age: 72
End: 2022-02-10

## 2022-02-15 ENCOUNTER — AUDIOLOGY DOCUMENTATION (OUTPATIENT)
Dept: AUDIOLOGY | Facility: CLINIC | Age: 72
End: 2022-02-15

## 2022-02-23 ENCOUNTER — SURGERY CENTER DOCUMENTATION (OUTPATIENT)
Dept: SURGERY | Age: 72
End: 2022-02-23

## 2022-02-23 NOTE — PROCEDURES
[facility] [address]            Patient: Tawnya Landrum  MR #: [id number]  : 1950        Operative Report        Date of procedure: 2022    Preoperative diagnosis: Spinal stenosis right radiculopathy    Postop diagnosis:Spinal stenosis right radiculopathy        Procedure:   L5/S1  Lumbar epidural steroid injection fluoroscopic guidance and  image saved    Surgeon: Magnolia Booth. Ange Horton MD    Indications: 67year old male with Spinal stenosis right radiculopathy      Operative procedure:   Patient was brought to the procedure room suite and placed in the prone position with a pillow under the lower pelvis. Patient was prepped and draped in normal sterile fashion. After fluoroscopic localization Xylocaine 1% was instilled per 25-gauge needle into the skin and subcutaneous tissue at the level of L5/S1. An 18-gauge Touhy needle was then placed and advanced under direct fluoroscopic guidance and loss-of-resistance technique to the epidural space without difficulty. There was no CSF paresthesia or blood noted. After negative aspiration,   This was then followed by a solution containing 80 mg of Depo-Medrol and 8 mL's of preservative-free normal saline, which was instilled without difficulty. The needle was withdrawn. A Band-Aid was applied. The patient tolerated procedure well without complication was discharged home with instructions. Electronically approved by: Magnolia Booth.  Ange Horton MD

## 2022-03-09 ENCOUNTER — OFFICE VISIT (OUTPATIENT)
Dept: PAIN CLINIC | Facility: HOSPITAL | Age: 72
End: 2022-03-09
Attending: ANESTHESIOLOGY
Payer: MEDICARE

## 2022-03-09 VITALS — OXYGEN SATURATION: 99 % | SYSTOLIC BLOOD PRESSURE: 142 MMHG | HEART RATE: 68 BPM | DIASTOLIC BLOOD PRESSURE: 82 MMHG

## 2022-03-09 DIAGNOSIS — M51.36 DEGENERATIVE DISC DISEASE, LUMBAR: ICD-10-CM

## 2022-03-09 DIAGNOSIS — G89.29 CHRONIC RIGHT-SIDED LOW BACK PAIN WITH RIGHT-SIDED SCIATICA: ICD-10-CM

## 2022-03-09 DIAGNOSIS — M48.062 SPINAL STENOSIS OF LUMBAR REGION WITH NEUROGENIC CLAUDICATION: Primary | ICD-10-CM

## 2022-03-09 DIAGNOSIS — M54.41 CHRONIC RIGHT-SIDED LOW BACK PAIN WITH RIGHT-SIDED SCIATICA: ICD-10-CM

## 2022-03-09 PROCEDURE — 99211 OFF/OP EST MAY X REQ PHY/QHP: CPT

## 2022-03-09 NOTE — PROGRESS NOTES
PT presents ambulatory to the CPM.  PT reports about 25-30% improvement and can now sleep well without medication. Painn increases with walking and standing. Dr. Alysia Parker saw PT for LESI L5-S1 F/U.   Refer to dictation for POC

## 2022-03-10 PROBLEM — M54.41 ACUTE BACK PAIN WITH SCIATICA, RIGHT: Status: ACTIVE | Noted: 2020-04-23

## 2022-03-11 ENCOUNTER — DOCUMENTATION ONLY (OUTPATIENT)
Dept: PAIN CLINIC | Facility: HOSPITAL | Age: 72
End: 2022-03-11

## 2022-03-11 NOTE — PROGRESS NOTES
Procedure code 08699---15/24/2022 APPROVED    Medicare--- No PA needed per pt's insurance   Order form faxed to the surgery center

## 2022-03-12 ENCOUNTER — LAB ENCOUNTER (OUTPATIENT)
Dept: LAB | Facility: HOSPITAL | Age: 72
End: 2022-03-12
Attending: INTERNAL MEDICINE
Payer: MEDICARE

## 2022-03-12 DIAGNOSIS — Z01.818 PRE-OP TESTING: ICD-10-CM

## 2022-03-12 LAB — SARS-COV-2 RNA RESP QL NAA+PROBE: NOT DETECTED

## 2022-03-15 ENCOUNTER — ANESTHESIA (OUTPATIENT)
Dept: ENDOSCOPY | Facility: HOSPITAL | Age: 72
End: 2022-03-15
Payer: MEDICARE

## 2022-03-15 ENCOUNTER — PATIENT MESSAGE (OUTPATIENT)
Dept: INTERNAL MEDICINE CLINIC | Facility: CLINIC | Age: 72
End: 2022-03-15

## 2022-03-15 ENCOUNTER — HOSPITAL ENCOUNTER (OUTPATIENT)
Facility: HOSPITAL | Age: 72
Setting detail: HOSPITAL OUTPATIENT SURGERY
Discharge: HOME | End: 2022-03-15
Attending: INTERNAL MEDICINE | Admitting: INTERNAL MEDICINE
Payer: MEDICARE

## 2022-03-15 ENCOUNTER — ANESTHESIA EVENT (OUTPATIENT)
Dept: ENDOSCOPY | Facility: HOSPITAL | Age: 72
End: 2022-03-15
Payer: MEDICARE

## 2022-03-15 VITALS
TEMPERATURE: 98 F | WEIGHT: 160 LBS | HEIGHT: 70 IN | SYSTOLIC BLOOD PRESSURE: 150 MMHG | RESPIRATION RATE: 17 BRPM | OXYGEN SATURATION: 99 % | DIASTOLIC BLOOD PRESSURE: 86 MMHG | HEART RATE: 60 BPM | BODY MASS INDEX: 22.9 KG/M2

## 2022-03-15 DIAGNOSIS — Z86.010 HISTORY OF COLON POLYPS: ICD-10-CM

## 2022-03-15 DIAGNOSIS — Z01.818 PRE-OP TESTING: Primary | ICD-10-CM

## 2022-03-15 PROCEDURE — 0DJD8ZZ INSPECTION OF LOWER INTESTINAL TRACT, VIA NATURAL OR ARTIFICIAL OPENING ENDOSCOPIC: ICD-10-PCS | Performed by: INTERNAL MEDICINE

## 2022-03-15 PROCEDURE — G0105 COLORECTAL SCRN; HI RISK IND: HCPCS | Performed by: INTERNAL MEDICINE

## 2022-03-15 RX ORDER — SODIUM CHLORIDE, SODIUM LACTATE, POTASSIUM CHLORIDE, CALCIUM CHLORIDE 600; 310; 30; 20 MG/100ML; MG/100ML; MG/100ML; MG/100ML
INJECTION, SOLUTION INTRAVENOUS CONTINUOUS
Status: DISCONTINUED | OUTPATIENT
Start: 2022-03-15 | End: 2022-03-15

## 2022-03-15 RX ORDER — AMLODIPINE BESYLATE 2.5 MG/1
2.5 TABLET ORAL DAILY
Qty: 90 TABLET | Refills: 0 | Status: SHIPPED | OUTPATIENT
Start: 2022-03-15

## 2022-03-15 RX ORDER — LIDOCAINE HYDROCHLORIDE 10 MG/ML
INJECTION, SOLUTION EPIDURAL; INFILTRATION; INTRACAUDAL; PERINEURAL AS NEEDED
Status: DISCONTINUED | OUTPATIENT
Start: 2022-03-15 | End: 2022-03-15 | Stop reason: SURG

## 2022-03-15 RX ORDER — NALOXONE HYDROCHLORIDE 0.4 MG/ML
80 INJECTION, SOLUTION INTRAMUSCULAR; INTRAVENOUS; SUBCUTANEOUS AS NEEDED
Status: DISCONTINUED | OUTPATIENT
Start: 2022-03-15 | End: 2022-03-15

## 2022-03-15 RX ADMIN — SODIUM CHLORIDE, SODIUM LACTATE, POTASSIUM CHLORIDE, CALCIUM CHLORIDE: 600; 310; 30; 20 INJECTION, SOLUTION INTRAVENOUS at 12:55:00

## 2022-03-15 RX ADMIN — LIDOCAINE HYDROCHLORIDE 50 MG: 10 INJECTION, SOLUTION EPIDURAL; INFILTRATION; INTRACAUDAL; PERINEURAL at 12:58:00

## 2022-03-15 RX ADMIN — SODIUM CHLORIDE, SODIUM LACTATE, POTASSIUM CHLORIDE, CALCIUM CHLORIDE: 600; 310; 30; 20 INJECTION, SOLUTION INTRAVENOUS at 13:25:00

## 2022-03-15 NOTE — TELEPHONE ENCOUNTER
From: Clinton Brady  To: Zak Gordon MD  Sent: 3/15/2022 7:22 AM CDT  Subject: Amlodipine refill    Hi Dr. Manjeet Turner,  Could you please authorize a refill of my Amlodipine prescription? Thank you!   Chinyere Galvez

## 2022-03-15 NOTE — ANESTHESIA POSTPROCEDURE EVALUATION
Patient: Villa Calderón    Procedure Summary     Date: 03/15/22 Room / Location: [address] [place] [address] ENDOSCOPY 05 / [facility] ENDOSCOPY    Anesthesia Start: 2097 Anesthesia Stop: 7092    Procedure: COLONOSCOPY (N/A ) Diagnosis:       History of colon polyps      (hemorrhoids)    Surgeons: Dimas Butt MD Anesthesiologist: Daniel Faria CRNA    Anesthesia Type: MAC ASA Status: 2          Anesthesia Type: MAC    Vitals Value Taken Time   /77 03/15/22 1325   Temp  03/15/22 1325   Pulse 64 03/15/22 1325   Resp 16 03/15/22 1325   SpO2 97 % 03/15/22 1325       [facility] AN Post Evaluation:   Patient Evaluated in PACU  Patient Participation: complete - patient participated  Level of Consciousness: sleepy but conscious  Pain Score: 0  Pain Management: adequate  Airway Patency:patent  Dental exam unchanged from preop  Yes    Cardiovascular Status: hemodynamically stable  Respiratory Status: room air  Postoperative Hydration acceptable      Lina Javed CRNA  3/15/2022 1:25 PM

## 2022-03-17 ENCOUNTER — OFFICE VISIT (OUTPATIENT)
Dept: AUDIOLOGY | Facility: CLINIC | Age: 72
End: 2022-03-17
Payer: MEDICARE

## 2022-03-17 ENCOUNTER — OFFICE VISIT (OUTPATIENT)
Dept: OTOLARYNGOLOGY | Facility: CLINIC | Age: 72
End: 2022-03-17
Payer: MEDICARE

## 2022-03-17 VITALS — WEIGHT: 160 LBS | HEIGHT: 70 IN | BODY MASS INDEX: 22.9 KG/M2

## 2022-03-17 DIAGNOSIS — H61.23 BILATERAL IMPACTED CERUMEN: Primary | ICD-10-CM

## 2022-03-17 DIAGNOSIS — H90.3 SENSORINEURAL HEARING LOSS, BILATERAL: Primary | ICD-10-CM

## 2022-03-17 PROCEDURE — 92593 HEARING AID CHECK, BOTH EARS: CPT | Performed by: AUDIOLOGIST

## 2022-03-17 PROCEDURE — V5266 BATTERY FOR HEARING DEVICE: HCPCS | Performed by: AUDIOLOGIST

## 2022-03-17 PROCEDURE — 99213 OFFICE O/P EST LOW 20 MIN: CPT | Performed by: OTOLARYNGOLOGY

## 2022-04-08 ENCOUNTER — PATIENT MESSAGE (OUTPATIENT)
Dept: SURGERY | Facility: CLINIC | Age: 72
End: 2022-04-08

## 2022-04-12 ENCOUNTER — LAB ENCOUNTER (OUTPATIENT)
Dept: LAB | Facility: HOSPITAL | Age: 72
End: 2022-04-12
Attending: UROLOGY
Payer: MEDICARE

## 2022-04-12 DIAGNOSIS — R35.1 NOCTURIA: ICD-10-CM

## 2022-04-12 DIAGNOSIS — Z12.5 PROSTATE CANCER SCREENING: ICD-10-CM

## 2022-04-12 LAB
BILIRUB UR QL: NEGATIVE
COLOR UR: YELLOW
COMPLEXED PSA SERPL-MCNC: 2.91 NG/ML (ref ?–4)
GLUCOSE UR-MCNC: NEGATIVE MG/DL
HGB UR QL STRIP.AUTO: NEGATIVE
KETONES UR-MCNC: NEGATIVE MG/DL
LEUKOCYTE ESTERASE UR QL STRIP.AUTO: NEGATIVE
NITRITE UR QL STRIP.AUTO: NEGATIVE
PH UR: 8 [PH] (ref 5–8)
PROT UR-MCNC: NEGATIVE MG/DL
SP GR UR STRIP: 1.01 (ref 1–1.03)
UROBILINOGEN UR STRIP-ACNC: <2
VIT C UR-MCNC: NEGATIVE MG/DL

## 2022-04-12 PROCEDURE — 36415 COLL VENOUS BLD VENIPUNCTURE: CPT

## 2022-04-12 PROCEDURE — 81003 URINALYSIS AUTO W/O SCOPE: CPT

## 2022-04-18 ENCOUNTER — OFFICE VISIT (OUTPATIENT)
Dept: SURGERY | Facility: CLINIC | Age: 72
End: 2022-04-18
Payer: MEDICARE

## 2022-04-18 DIAGNOSIS — R35.1 NOCTURIA: ICD-10-CM

## 2022-04-18 DIAGNOSIS — Z87.448 HISTORY OF HEMATURIA: ICD-10-CM

## 2022-04-18 DIAGNOSIS — R35.0 BENIGN PROSTATIC HYPERPLASIA WITH URINARY FREQUENCY: Primary | ICD-10-CM

## 2022-04-18 DIAGNOSIS — N40.1 BENIGN PROSTATIC HYPERPLASIA WITH URINARY FREQUENCY: Primary | ICD-10-CM

## 2022-04-18 DIAGNOSIS — Z12.5 PROSTATE CANCER SCREENING: ICD-10-CM

## 2022-04-18 PROCEDURE — 99214 OFFICE O/P EST MOD 30 MIN: CPT | Performed by: UROLOGY

## 2022-04-18 RX ORDER — FINASTERIDE 5 MG/1
5 TABLET, FILM COATED ORAL DAILY
Qty: 90 TABLET | Refills: 3 | Status: SHIPPED | OUTPATIENT
Start: 2022-04-18 | End: 2023-04-18

## 2022-04-26 ENCOUNTER — OFFICE VISIT (OUTPATIENT)
Dept: PAIN CLINIC | Facility: HOSPITAL | Age: 72
End: 2022-04-26
Attending: ANESTHESIOLOGY
Payer: MEDICARE

## 2022-04-26 VITALS — WEIGHT: 160 LBS | HEIGHT: 70 IN | BODY MASS INDEX: 22.9 KG/M2 | RESPIRATION RATE: 18 BRPM

## 2022-04-26 DIAGNOSIS — M48.062 SPINAL STENOSIS OF LUMBAR REGION WITH NEUROGENIC CLAUDICATION: Primary | ICD-10-CM

## 2022-04-26 PROCEDURE — 99211 OFF/OP EST MAY X REQ PHY/QHP: CPT

## 2022-04-26 NOTE — PROGRESS NOTES
4/26/2022-Presents ambulatory to  CPM;  F/U-POST RT TRANS L5S1  3/24/2022-BELAVIC;  Pt reports 70-75% imprvmt;  States he rarely has the radiating RLE pain;'  Seen by Dr. Saniya Curiel; refer to dictation for the continued plan of care.

## 2022-06-02 PROBLEM — M54.41 CHRONIC RIGHT-SIDED LOW BACK PAIN WITH RIGHT-SIDED SCIATICA: Status: ACTIVE | Noted: 2022-06-02

## 2022-06-02 PROBLEM — G89.29 CHRONIC RIGHT-SIDED LOW BACK PAIN WITH RIGHT-SIDED SCIATICA: Status: ACTIVE | Noted: 2022-06-02

## 2022-06-06 ENCOUNTER — PATIENT MESSAGE (OUTPATIENT)
Dept: INTERNAL MEDICINE CLINIC | Facility: CLINIC | Age: 72
End: 2022-06-06

## 2022-06-06 NOTE — TELEPHONE ENCOUNTER
From: Octaviano Harmon  To: Kacey Gupta MD  Sent: 6/6/2022 9:19 AM CDT  Subject: Refill for Amlodipine    Hi Dr. Emile Calderon,   Could you please order a refill of my Amlodipine prescription? Thanks!   Shaniqua Lopez

## 2022-06-07 RX ORDER — AMLODIPINE BESYLATE 2.5 MG/1
2.5 TABLET ORAL DAILY
Qty: 90 TABLET | Refills: 3 | Status: SHIPPED | OUTPATIENT
Start: 2022-06-07

## 2022-06-29 ENCOUNTER — APPOINTMENT (OUTPATIENT)
Dept: URBAN - METROPOLITAN AREA CLINIC 244 | Age: 72
Setting detail: DERMATOLOGY
End: 2022-06-30

## 2022-06-29 DIAGNOSIS — B07.8 OTHER VIRAL WARTS: ICD-10-CM

## 2022-06-29 DIAGNOSIS — Z85.828 PERSONAL HISTORY OF OTHER MALIGNANT NEOPLASM OF SKIN: ICD-10-CM

## 2022-06-29 DIAGNOSIS — L82.1 OTHER SEBORRHEIC KERATOSIS: ICD-10-CM

## 2022-06-29 DIAGNOSIS — L57.0 ACTINIC KERATOSIS: ICD-10-CM

## 2022-06-29 DIAGNOSIS — D22 MELANOCYTIC NEVI: ICD-10-CM

## 2022-06-29 DIAGNOSIS — L81.4 OTHER MELANIN HYPERPIGMENTATION: ICD-10-CM

## 2022-06-29 PROBLEM — D22.5 MELANOCYTIC NEVI OF TRUNK: Status: ACTIVE | Noted: 2022-06-29

## 2022-06-29 PROCEDURE — 17003 DESTRUCT PREMALG LES 2-14: CPT | Mod: 59

## 2022-06-29 PROCEDURE — 17110 DESTRUCT B9 LESION 1-14: CPT

## 2022-06-29 PROCEDURE — OTHER COUNSELING: OTHER

## 2022-06-29 PROCEDURE — 17000 DESTRUCT PREMALG LESION: CPT | Mod: 59

## 2022-06-29 PROCEDURE — 99213 OFFICE O/P EST LOW 20 MIN: CPT | Mod: 25

## 2022-06-29 PROCEDURE — OTHER LIQUID NITROGEN: OTHER

## 2022-06-29 ASSESSMENT — LOCATION SIMPLE DESCRIPTION DERM
LOCATION SIMPLE: NOSE
LOCATION SIMPLE: LEFT UPPER BACK
LOCATION SIMPLE: LEFT CHEEK
LOCATION SIMPLE: RIGHT CHEEK
LOCATION SIMPLE: RIGHT FOREHEAD
LOCATION SIMPLE: LEFT SHOULDER
LOCATION SIMPLE: CHEST
LOCATION SIMPLE: RIGHT TEMPLE
LOCATION SIMPLE: LEFT FOREHEAD
LOCATION SIMPLE: RIGHT UPPER BACK

## 2022-06-29 ASSESSMENT — LOCATION ZONE DERM
LOCATION ZONE: ARM
LOCATION ZONE: FACE
LOCATION ZONE: TRUNK
LOCATION ZONE: NOSE

## 2022-06-29 ASSESSMENT — LOCATION DETAILED DESCRIPTION DERM
LOCATION DETAILED: LEFT SUPERIOR FOREHEAD
LOCATION DETAILED: RIGHT SUPERIOR LATERAL MALAR CHEEK
LOCATION DETAILED: RIGHT MID TEMPLE
LOCATION DETAILED: RIGHT SUPERIOR FOREHEAD
LOCATION DETAILED: UPPER STERNUM
LOCATION DETAILED: LEFT MEDIAL UPPER BACK
LOCATION DETAILED: LEFT SUPERIOR LATERAL MALAR CHEEK
LOCATION DETAILED: LEFT POSTERIOR SHOULDER
LOCATION DETAILED: LEFT INFERIOR LATERAL FOREHEAD
LOCATION DETAILED: NASAL DORSUM
LOCATION DETAILED: LEFT SUPERIOR MEDIAL FOREHEAD
LOCATION DETAILED: RIGHT SUPERIOR MEDIAL UPPER BACK

## 2022-06-29 NOTE — PROCEDURE: LIQUID NITROGEN
Post-Care Instructions: I reviewed with the patient in detail post-care instructions. Patient is to wear sunprotection, and avoid picking at any of the treated lesions. Pt may apply Vaseline to crusted or scabbing areas.
Medical Necessity Information: It is in your best interest to select a reason for this procedure from the list below. All of these items fulfill various CMS LCD requirements except the new and changing color options.
Render In Bullet Format When Appropriate: No
Total Number Of Lesions Treated: 2
Consent: The patient's consent was obtained including but not limited to risks of crusting, scabbing, blistering, scarring, darker or lighter pigmentary change, recurrence, incomplete removal and infection.
Duration Of Freeze Thaw-Cycle (Seconds): 5
Render Post Care In The Note?: yes
Spray Paint Text: The liquid nitrogen was applied to the skin utilizing a spray paint frosting technique.
Number Of Freeze-Thaw Cycles: 1 freeze-thaw cycle
Medical Necessity Clause: This procedure was medically necessary because the lesions that were treated were:
Detail Level: Zone
Detail Level: Simple
Duration Of Freeze Thaw-Cycle (Seconds): 0
Total Number Of Aks Treated: 11

## 2022-07-02 ENCOUNTER — OFFICE VISIT (OUTPATIENT)
Dept: FAMILY MEDICINE CLINIC | Facility: CLINIC | Age: 72
End: 2022-07-02
Payer: MEDICARE

## 2022-07-02 VITALS
DIASTOLIC BLOOD PRESSURE: 78 MMHG | RESPIRATION RATE: 20 BRPM | HEART RATE: 76 BPM | TEMPERATURE: 98 F | SYSTOLIC BLOOD PRESSURE: 130 MMHG | OXYGEN SATURATION: 98 %

## 2022-07-02 DIAGNOSIS — H10.12 ALLERGIC CONJUNCTIVITIS OF LEFT EYE: Primary | ICD-10-CM

## 2022-08-08 ENCOUNTER — TELEPHONE (OUTPATIENT)
Dept: INTERNAL MEDICINE CLINIC | Facility: CLINIC | Age: 72
End: 2022-08-08

## 2022-08-08 ENCOUNTER — TELEMEDICINE (OUTPATIENT)
Dept: INTERNAL MEDICINE CLINIC | Facility: CLINIC | Age: 72
End: 2022-08-08
Payer: MEDICARE

## 2022-08-08 DIAGNOSIS — U07.1 COVID: Primary | ICD-10-CM

## 2022-10-10 ENCOUNTER — TELEPHONE (OUTPATIENT)
Dept: INTERNAL MEDICINE CLINIC | Facility: CLINIC | Age: 72
End: 2022-10-10

## 2022-10-10 DIAGNOSIS — Z00.00 PHYSICAL EXAM: Primary | ICD-10-CM

## 2022-10-10 NOTE — TELEPHONE ENCOUNTER
Patient scheduled his AWV appointment on 1/18/2023. He is asking if an order for his blood work could be entered, so he could complete this before his appointment.

## 2022-10-11 NOTE — TELEPHONE ENCOUNTER
Left voicemail for patient, that lab work order has been entered and how he can schedule lab appointment.

## 2022-11-10 PROBLEM — M87.051 AVASCULAR NECROSIS OF BONE OF RIGHT HIP (HCC): Status: ACTIVE | Noted: 2022-11-10

## 2022-11-10 PROBLEM — M25.551 RIGHT HIP PAIN: Status: ACTIVE | Noted: 2022-11-10

## 2022-11-10 PROBLEM — M16.11 PRIMARY OSTEOARTHRITIS OF RIGHT HIP: Status: ACTIVE | Noted: 2022-11-10

## 2022-11-14 ENCOUNTER — TELEPHONE (OUTPATIENT)
Dept: HEMATOLOGY/ONCOLOGY | Facility: HOSPITAL | Age: 72
End: 2022-11-14

## 2022-11-14 ENCOUNTER — TELEPHONE (OUTPATIENT)
Dept: INTERNAL MEDICINE CLINIC | Facility: CLINIC | Age: 72
End: 2022-11-14

## 2022-11-14 NOTE — TELEPHONE ENCOUNTER
He needs to get EKG and all labs ordered before he sees me for the pre-op appointment thanks ( labs ordered my myself and ortho  Should fast)

## 2022-11-14 NOTE — TELEPHONE ENCOUNTER
Patient called. He is having a hip replacement. Dr Mathew Luo office would like him to see Dr Brittany Hager and have a radiation treatment the day before the surgury. Surgery schedule 1/18/2023. Please call patient.

## 2022-11-14 NOTE — TELEPHONE ENCOUNTER
Either fine  [place] or Dr Christine Baptiste  Has to be w/in 30 days of surgery ( but ideally closer to 30 days before surgery)  Thanks

## 2022-11-14 NOTE — TELEPHONE ENCOUNTER
Patient called back. Said he missed a call. Patient would like a message with a name if he misses the next call. Thank you.

## 2022-11-14 NOTE — TELEPHONE ENCOUNTER
I scheduled his presurgical visit on 1/4 at 12:20. He would like a return call from Dr Emile Calderon, medical assistant to clarify the presurgical tests he needs to have done. Dr. Mariella Butler office told him he was going to enter tests that needed to be completed before his surgery. Patient wants to know if he can have the tests done at this office. Would like to have the tests done before seeing Dr. Emile Calderon if possible.

## 2022-11-14 NOTE — TELEPHONE ENCOUNTER
Patient called as he needs to schedule a presurgical visit with Dr. Emre Sethi, as he is having a full hip replacement on 1/18. Asking if Dr. Emre Sethi can find a spot for this appointment. Or should he see Dr. Reed Found?

## 2022-12-15 ENCOUNTER — OFFICE VISIT (OUTPATIENT)
Dept: RADIATION ONCOLOGY | Facility: HOSPITAL | Age: 72
End: 2022-12-15
Attending: RADIOLOGY
Payer: MEDICARE

## 2022-12-15 VITALS
TEMPERATURE: 97 F | OXYGEN SATURATION: 97 % | BODY MASS INDEX: 23 KG/M2 | WEIGHT: 158 LBS | HEART RATE: 74 BPM | DIASTOLIC BLOOD PRESSURE: 80 MMHG | RESPIRATION RATE: 18 BRPM | SYSTOLIC BLOOD PRESSURE: 156 MMHG

## 2022-12-15 PROCEDURE — 99212 OFFICE O/P EST SF 10 MIN: CPT

## 2022-12-28 ENCOUNTER — HOSPITAL ENCOUNTER (OUTPATIENT)
Dept: GENERAL RADIOLOGY | Facility: HOSPITAL | Age: 72
Discharge: HOME | End: 2022-12-28
Attending: ORTHOPAEDIC SURGERY
Payer: MEDICARE

## 2022-12-28 ENCOUNTER — EKG ENCOUNTER (OUTPATIENT)
Dept: LAB | Facility: HOSPITAL | Age: 72
End: 2022-12-28
Attending: ORTHOPAEDIC SURGERY
Payer: MEDICARE

## 2022-12-28 DIAGNOSIS — M54.41 CHRONIC RIGHT-SIDED LOW BACK PAIN WITH RIGHT-SIDED SCIATICA: ICD-10-CM

## 2022-12-28 DIAGNOSIS — Z00.00 PHYSICAL EXAM: ICD-10-CM

## 2022-12-28 DIAGNOSIS — Z01.818 PREOP TESTING: ICD-10-CM

## 2022-12-28 DIAGNOSIS — G89.29 CHRONIC RIGHT-SIDED LOW BACK PAIN WITH RIGHT-SIDED SCIATICA: ICD-10-CM

## 2022-12-28 DIAGNOSIS — M48.062 SPINAL STENOSIS OF LUMBAR REGION WITH NEUROGENIC CLAUDICATION: ICD-10-CM

## 2022-12-28 DIAGNOSIS — M25.551 RIGHT HIP PAIN: ICD-10-CM

## 2022-12-28 LAB
ALBUMIN SERPL-MCNC: 3.5 G/DL (ref 3.4–5)
ALBUMIN/GLOB SERPL: 1.1 {RATIO} (ref 1–2)
ALP LIVER SERPL-CCNC: 92 U/L
ALT SERPL-CCNC: 21 U/L
ANION GAP SERPL CALC-SCNC: 6 MMOL/L (ref 0–18)
ANTIBODY SCREEN: NEGATIVE
APTT PPP: 26.1 SECONDS (ref 23.3–35.6)
AST SERPL-CCNC: 16 U/L (ref 15–37)
ATRIAL RATE: 61 BPM
BASOPHILS # BLD AUTO: 0.06 X10(3) UL (ref 0–0.2)
BASOPHILS NFR BLD AUTO: 0.8 %
BILIRUB SERPL-MCNC: 0.8 MG/DL (ref 0.1–2)
BILIRUB UR QL: NEGATIVE
BUN BLD-MCNC: 19 MG/DL (ref 7–18)
BUN/CREAT SERPL: 18.4 (ref 10–20)
CALCIUM BLD-MCNC: 8.6 MG/DL (ref 8.5–10.1)
CHLORIDE SERPL-SCNC: 106 MMOL/L (ref 98–112)
CHOLEST SERPL-MCNC: 170 MG/DL (ref ?–200)
CLARITY UR: CLEAR
CO2 SERPL-SCNC: 30 MMOL/L (ref 21–32)
COLOR UR: YELLOW
CREAT BLD-MCNC: 1.03 MG/DL
DEPRECATED RDW RBC AUTO: 44.5 FL (ref 35.1–46.3)
EOSINOPHIL # BLD AUTO: 0.57 X10(3) UL (ref 0–0.7)
EOSINOPHIL NFR BLD AUTO: 7.6 %
ERYTHROCYTE [DISTWIDTH] IN BLOOD BY AUTOMATED COUNT: 12.9 % (ref 11–15)
FASTING PATIENT LIPID ANSWER: YES
FASTING STATUS PATIENT QL REPORTED: YES
GFR SERPLBLD BASED ON 1.73 SQ M-ARVRAT: 77 ML/MIN/1.73M2 (ref 60–?)
GLOBULIN PLAS-MCNC: 3.3 G/DL (ref 2.8–4.4)
GLUCOSE BLD-MCNC: 95 MG/DL (ref 70–99)
GLUCOSE UR-MCNC: NEGATIVE MG/DL
HCT VFR BLD AUTO: 47.3 %
HDLC SERPL-MCNC: 74 MG/DL (ref 40–59)
HGB BLD-MCNC: 15.8 G/DL
IMM GRANULOCYTES # BLD AUTO: 0.01 X10(3) UL (ref 0–1)
IMM GRANULOCYTES NFR BLD: 0.1 %
INR BLD: 0.97 (ref 0.85–1.16)
KETONES UR-MCNC: NEGATIVE MG/DL
LDLC SERPL CALC-MCNC: 72 MG/DL (ref ?–100)
LEUKOCYTE ESTERASE UR QL STRIP.AUTO: NEGATIVE
LYMPHOCYTES # BLD AUTO: 2.48 X10(3) UL (ref 1–4)
LYMPHOCYTES NFR BLD AUTO: 32.9 %
MCH RBC QN AUTO: 31.1 PG (ref 26–34)
MCHC RBC AUTO-ENTMCNC: 33.4 G/DL (ref 31–37)
MCV RBC AUTO: 93.1 FL
MONOCYTES # BLD AUTO: 0.53 X10(3) UL (ref 0.1–1)
MONOCYTES NFR BLD AUTO: 7 %
MRSA DNA SPEC QL NAA+PROBE: NEGATIVE
NEUTROPHILS # BLD AUTO: 3.88 X10 (3) UL (ref 1.5–7.7)
NEUTROPHILS # BLD AUTO: 3.88 X10(3) UL (ref 1.5–7.7)
NEUTROPHILS NFR BLD AUTO: 51.6 %
NITRITE UR QL STRIP.AUTO: NEGATIVE
NONHDLC SERPL-MCNC: 96 MG/DL (ref ?–130)
OSMOLALITY SERPL CALC.SUM OF ELEC: 296 MOSM/KG (ref 275–295)
P AXIS: 61 DEGREES
P-R INTERVAL: 176 MS
PH UR: 7 [PH] (ref 5–8)
PLATELET # BLD AUTO: 235 10(3)UL (ref 150–450)
POTASSIUM SERPL-SCNC: 3.8 MMOL/L (ref 3.5–5.1)
PROT SERPL-MCNC: 6.8 G/DL (ref 6.4–8.2)
PROT UR-MCNC: NEGATIVE MG/DL
PROTHROMBIN TIME: 12.8 SECONDS (ref 11.6–14.8)
Q-T INTERVAL: 422 MS
QRS DURATION: 82 MS
QTC CALCULATION (BEZET): 424 MS
R AXIS: 66 DEGREES
RBC # BLD AUTO: 5.08 X10(6)UL
RH BLOOD TYPE: POSITIVE
RH BLOOD TYPE: POSITIVE
SODIUM SERPL-SCNC: 142 MMOL/L (ref 136–145)
SP GR UR STRIP: 1.02 (ref 1–1.03)
T AXIS: 69 DEGREES
TRIGL SERPL-MCNC: 142 MG/DL (ref 30–149)
UROBILINOGEN UR STRIP-ACNC: 0.2
VENTRICULAR RATE: 61 BPM
VLDLC SERPL CALC-MCNC: 22 MG/DL (ref 0–30)
WBC # BLD AUTO: 7.5 X10(3) UL (ref 4–11)

## 2022-12-28 PROCEDURE — 93010 ELECTROCARDIOGRAM REPORT: CPT | Performed by: STUDENT IN AN ORGANIZED HEALTH CARE EDUCATION/TRAINING PROGRAM

## 2022-12-28 PROCEDURE — 86850 RBC ANTIBODY SCREEN: CPT

## 2022-12-28 PROCEDURE — 85610 PROTHROMBIN TIME: CPT

## 2022-12-28 PROCEDURE — 86900 BLOOD TYPING SEROLOGIC ABO: CPT

## 2022-12-28 PROCEDURE — 81001 URINALYSIS AUTO W/SCOPE: CPT

## 2022-12-28 PROCEDURE — 36415 COLL VENOUS BLD VENIPUNCTURE: CPT

## 2022-12-28 PROCEDURE — 71046 X-RAY EXAM CHEST 2 VIEWS: CPT | Performed by: ORTHOPAEDIC SURGERY

## 2022-12-28 PROCEDURE — 86901 BLOOD TYPING SEROLOGIC RH(D): CPT

## 2022-12-28 PROCEDURE — 80061 LIPID PANEL: CPT

## 2022-12-28 PROCEDURE — 85730 THROMBOPLASTIN TIME PARTIAL: CPT

## 2022-12-28 PROCEDURE — 85025 COMPLETE CBC W/AUTO DIFF WBC: CPT

## 2022-12-28 PROCEDURE — 93005 ELECTROCARDIOGRAM TRACING: CPT

## 2022-12-28 PROCEDURE — 80053 COMPREHEN METABOLIC PANEL: CPT

## 2022-12-28 PROCEDURE — 81015 MICROSCOPIC EXAM OF URINE: CPT

## 2022-12-28 PROCEDURE — 72100 X-RAY EXAM L-S SPINE 2/3 VWS: CPT | Performed by: ORTHOPAEDIC SURGERY

## 2022-12-28 PROCEDURE — 73502 X-RAY EXAM HIP UNI 2-3 VIEWS: CPT | Performed by: ORTHOPAEDIC SURGERY

## 2022-12-28 PROCEDURE — 87641 MR-STAPH DNA AMP PROBE: CPT

## 2023-01-04 ENCOUNTER — OFFICE VISIT (OUTPATIENT)
Dept: INTERNAL MEDICINE CLINIC | Facility: CLINIC | Age: 73
End: 2023-01-04
Payer: MEDICARE

## 2023-01-04 VITALS
BODY MASS INDEX: 23 KG/M2 | DIASTOLIC BLOOD PRESSURE: 86 MMHG | SYSTOLIC BLOOD PRESSURE: 128 MMHG | WEIGHT: 162 LBS | HEART RATE: 68 BPM | OXYGEN SATURATION: 97 %

## 2023-01-04 DIAGNOSIS — Z01.818 PRE-OP EXAM: Primary | ICD-10-CM

## 2023-01-04 PROCEDURE — 99214 OFFICE O/P EST MOD 30 MIN: CPT | Performed by: FAMILY MEDICINE

## 2023-01-10 ENCOUNTER — APPOINTMENT (OUTPATIENT)
Dept: RADIATION ONCOLOGY | Facility: HOSPITAL | Age: 73
End: 2023-01-10
Attending: RADIOLOGY
Payer: MEDICARE

## 2023-01-10 PROCEDURE — 77334 RADIATION TREATMENT AID(S): CPT | Performed by: RADIOLOGY

## 2023-01-10 PROCEDURE — 77290 THER RAD SIMULAJ FIELD CPLX: CPT | Performed by: RADIOLOGY

## 2023-01-11 PROCEDURE — 77334 RADIATION TREATMENT AID(S): CPT | Performed by: RADIOLOGY

## 2023-01-11 PROCEDURE — 77295 3-D RADIOTHERAPY PLAN: CPT | Performed by: RADIOLOGY

## 2023-01-11 PROCEDURE — 77300 RADIATION THERAPY DOSE PLAN: CPT | Performed by: RADIOLOGY

## 2023-01-16 ENCOUNTER — LAB ENCOUNTER (OUTPATIENT)
Dept: LAB | Facility: HOSPITAL | Age: 73
End: 2023-01-16
Attending: ORTHOPAEDIC SURGERY
Payer: MEDICARE

## 2023-01-16 DIAGNOSIS — Z01.818 PREOP TESTING: ICD-10-CM

## 2023-01-16 LAB — SARS-COV-2 RNA RESP QL NAA+PROBE: NOT DETECTED

## 2023-01-16 NOTE — PROGRESS NOTES
[facility] Radiation Treatment Management Note 1-5    Patient:  Trino Form  Age:  68year old  Visit Diagnosis:  No diagnosis found. Primary Rad/Onc:  Dr. Julius Snyder     Site Delivered Dose (cGy) Prescribed Dose (cGy) Fraction #   R HO Hip 700 700 1/1           First treatment date:  1/17/2023  Concurrent chemotherapy:  None    Oncology Vitals 12/15/2022 1/4/2023 1/14/2023   Height - - 5' 10.5\"   Height - - 179 cm   Weight 158 lb 162 lb 160 lb   Weight 71.668 kg 73.483 kg 72.576 kg   BSA (m2) 1.89 m2 1.91 m2 1.91 m2   /80 128/86 -   Pulse 74 68 -   Resp 18 - -   Temp 97.2 - -   SpO2 97 97 -   Pain Score 0 - -   Some recent data might be hidden        Toxicities:  None. Nursing Note:  Pt scheduled for surgery 1/18/2023. 1 fx RT delivered today. Afebrile. AVS given to patient. Good Renee RN    Physician Note:  Subjective:    S/p xrt to R hip joint space, no issues  Objective:  NAD      Treatment setup imaging have been reviewed: Yes    Assessment/Plan:     For surgery tomorrow    Dr. Shefali Hutchinson

## 2023-01-17 ENCOUNTER — APPOINTMENT (OUTPATIENT)
Dept: RADIATION ONCOLOGY | Facility: HOSPITAL | Age: 73
End: 2023-01-17
Attending: RADIOLOGY
Payer: MEDICARE

## 2023-01-17 ENCOUNTER — ANESTHESIA EVENT (OUTPATIENT)
Dept: SURGERY | Facility: HOSPITAL | Age: 73
End: 2023-01-17
Payer: MEDICARE

## 2023-01-17 VITALS
TEMPERATURE: 98 F | OXYGEN SATURATION: 96 % | RESPIRATION RATE: 18 BRPM | SYSTOLIC BLOOD PRESSURE: 168 MMHG | HEART RATE: 74 BPM | DIASTOLIC BLOOD PRESSURE: 76 MMHG

## 2023-01-17 PROCEDURE — 77412 RADIATION TX DELIVERY LVL 3: CPT | Performed by: RADIOLOGY

## 2023-01-17 PROCEDURE — 77280 THER RAD SIMULAJ FIELD SMPL: CPT | Performed by: RADIOLOGY

## 2023-01-17 NOTE — PROGRESS NOTES
RADIATION ONCOLOGY COMPLETION SUMMARY NOTE    DIAGNOSIS :  Right hip OA, for R LUZ 1/17/2023, s/p preop XRT to R hip joint space. Dear Luis Montes De Oca. colleagues,        As you recall, Gilberto Dalton is a pleasant 67year old male with  Right hip OA, for R LUZ  per Dr Paddy Montes De Oca. He has had persistent pain despite medical mgmt. He was recommended  radiation to prevent heterotopic bone ossification, for radiation therapy evaluation. Various management options were discussed and pt opted for a course of XRT as outlined below. SUMMARY OF RADIATION THERAPY  REGION TREATED:   R hip joint space   DOSE:    700 cGy in 1 tx   DATE:    1/17/2023   TECHNIQUE:   AP/PA    Overall, pt tolerated treatment well and the dose was delivered as planned. Pt otherwise did not acute sequela and pt will continue to follow closely with Dr. Paddy Montes De Oca for surgery and rehab. Thank you very much for allowing us to take care of your patient. Shelly Patino MD, [address] [place] [address]. [email or web address]  [phone]

## 2023-01-18 ENCOUNTER — APPOINTMENT (OUTPATIENT)
Dept: GENERAL RADIOLOGY | Facility: HOSPITAL | Age: 73
End: 2023-01-18
Attending: ORTHOPAEDIC SURGERY
Payer: MEDICARE

## 2023-01-18 ENCOUNTER — HOSPITAL ENCOUNTER (INPATIENT)
Facility: HOSPITAL | Age: 73
LOS: 3 days | Discharge: HOME | End: 2023-01-21
Attending: ORTHOPAEDIC SURGERY | Admitting: ORTHOPAEDIC SURGERY
Payer: MEDICARE

## 2023-01-18 ENCOUNTER — HOSPITAL ENCOUNTER (INPATIENT)
Facility: HOSPITAL | Age: 73
LOS: 3 days | Discharge: HOME HEALTH | End: 2023-01-21
Attending: ORTHOPAEDIC SURGERY | Admitting: ORTHOPAEDIC SURGERY
Payer: MEDICARE

## 2023-01-18 ENCOUNTER — ANESTHESIA (OUTPATIENT)
Dept: SURGERY | Facility: HOSPITAL | Age: 73
End: 2023-01-18
Payer: MEDICARE

## 2023-01-18 DIAGNOSIS — Z01.818 PREOP TESTING: Primary | ICD-10-CM

## 2023-01-18 LAB
DEPRECATED RDW RBC AUTO: 46.5 FL (ref 35.1–46.3)
ERYTHROCYTE [DISTWIDTH] IN BLOOD BY AUTOMATED COUNT: 13.2 % (ref 11–15)
HCT VFR BLD AUTO: 41.5 %
HGB BLD-MCNC: 13.8 G/DL
MCH RBC QN AUTO: 31.4 PG (ref 26–34)
MCHC RBC AUTO-ENTMCNC: 33.3 G/DL (ref 31–37)
MCV RBC AUTO: 94.5 FL
PLATELET # BLD AUTO: 213 10(3)UL (ref 150–450)
RBC # BLD AUTO: 4.39 X10(6)UL
WBC # BLD AUTO: 15.8 X10(3) UL (ref 4–11)

## 2023-01-18 PROCEDURE — 0SR90JA REPLACEMENT OF RIGHT HIP JOINT WITH SYNTHETIC SUBSTITUTE, UNCEMENTED, OPEN APPROACH: ICD-10-PCS | Performed by: ORTHOPAEDIC SURGERY

## 2023-01-18 PROCEDURE — 73501 X-RAY EXAM HIP UNI 1 VIEW: CPT | Performed by: ORTHOPAEDIC SURGERY

## 2023-01-18 PROCEDURE — 99232 SBSQ HOSP IP/OBS MODERATE 35: CPT | Performed by: HOSPITALIST

## 2023-01-18 DEVICE — IMPLANTABLE DEVICE
Type: IMPLANTABLE DEVICE | Site: HIP | Status: FUNCTIONAL
Brand: G7® VIVACIT-E®

## 2023-01-18 DEVICE — IMPLANTABLE DEVICE: Type: IMPLANTABLE DEVICE | Site: HIP | Status: FUNCTIONAL

## 2023-01-18 DEVICE — IMPLANTABLE DEVICE
Type: IMPLANTABLE DEVICE | Site: HIP | Status: FUNCTIONAL
Brand: ECHO® BI-METRIC® HIP SYSTEM

## 2023-01-18 RX ORDER — FAMOTIDINE 20 MG/1
20 TABLET, FILM COATED ORAL 2 TIMES DAILY
Status: DISCONTINUED | OUTPATIENT
Start: 2023-01-18 | End: 2023-01-21

## 2023-01-18 RX ORDER — METOCLOPRAMIDE HYDROCHLORIDE 5 MG/ML
INJECTION INTRAMUSCULAR; INTRAVENOUS
Status: COMPLETED
Start: 2023-01-18 | End: 2023-01-18

## 2023-01-18 RX ORDER — ROCURONIUM BROMIDE 10 MG/ML
INJECTION, SOLUTION INTRAVENOUS AS NEEDED
Status: DISCONTINUED | OUTPATIENT
Start: 2023-01-18 | End: 2023-01-18 | Stop reason: SURG

## 2023-01-18 RX ORDER — METOCLOPRAMIDE HYDROCHLORIDE 5 MG/ML
10 INJECTION INTRAMUSCULAR; INTRAVENOUS EVERY 8 HOURS PRN
Status: DISCONTINUED | OUTPATIENT
Start: 2023-01-18 | End: 2023-01-21

## 2023-01-18 RX ORDER — MELATONIN
325
Status: DISCONTINUED | OUTPATIENT
Start: 2023-01-19 | End: 2023-01-21

## 2023-01-18 RX ORDER — POLYETHYLENE GLYCOL 3350 17 G/17G
17 POWDER, FOR SOLUTION ORAL DAILY PRN
Status: DISCONTINUED | OUTPATIENT
Start: 2023-01-18 | End: 2023-01-21

## 2023-01-18 RX ORDER — TRAMADOL HYDROCHLORIDE 50 MG/1
50 TABLET ORAL EVERY 6 HOURS
Status: DISCONTINUED | OUTPATIENT
Start: 2023-01-18 | End: 2023-01-21

## 2023-01-18 RX ORDER — CYCLOBENZAPRINE HCL 10 MG
10 TABLET ORAL EVERY 8 HOURS PRN
Status: DISCONTINUED | OUTPATIENT
Start: 2023-01-18 | End: 2023-01-21

## 2023-01-18 RX ORDER — PHENYLEPHRINE HCL 10 MG/ML
VIAL (ML) INJECTION AS NEEDED
Status: DISCONTINUED | OUTPATIENT
Start: 2023-01-18 | End: 2023-01-18 | Stop reason: SURG

## 2023-01-18 RX ORDER — SODIUM CHLORIDE, SODIUM LACTATE, POTASSIUM CHLORIDE, CALCIUM CHLORIDE 600; 310; 30; 20 MG/100ML; MG/100ML; MG/100ML; MG/100ML
INJECTION, SOLUTION INTRAVENOUS CONTINUOUS
Status: DISCONTINUED | OUTPATIENT
Start: 2023-01-18 | End: 2023-01-18 | Stop reason: HOSPADM

## 2023-01-18 RX ORDER — DIPHENHYDRAMINE HYDROCHLORIDE 50 MG/ML
25 INJECTION INTRAMUSCULAR; INTRAVENOUS ONCE AS NEEDED
Status: ACTIVE | OUTPATIENT
Start: 2023-01-18 | End: 2023-01-18

## 2023-01-18 RX ORDER — EPHEDRINE SULFATE 50 MG/ML
INJECTION INTRAVENOUS AS NEEDED
Status: DISCONTINUED | OUTPATIENT
Start: 2023-01-18 | End: 2023-01-18 | Stop reason: SURG

## 2023-01-18 RX ORDER — HYDROMORPHONE HYDROCHLORIDE 1 MG/ML
0.8 INJECTION, SOLUTION INTRAMUSCULAR; INTRAVENOUS; SUBCUTANEOUS EVERY 2 HOUR PRN
Status: DISCONTINUED | OUTPATIENT
Start: 2023-01-18 | End: 2023-01-21

## 2023-01-18 RX ORDER — TRANEXAMIC ACID 10 MG/ML
INJECTION, SOLUTION INTRAVENOUS AS NEEDED
Status: DISCONTINUED | OUTPATIENT
Start: 2023-01-18 | End: 2023-01-18 | Stop reason: SURG

## 2023-01-18 RX ORDER — METOCLOPRAMIDE HYDROCHLORIDE 5 MG/ML
10 INJECTION INTRAMUSCULAR; INTRAVENOUS EVERY 8 HOURS PRN
Status: DISCONTINUED | OUTPATIENT
Start: 2023-01-18 | End: 2023-01-18 | Stop reason: HOSPADM

## 2023-01-18 RX ORDER — HYDROMORPHONE HYDROCHLORIDE 1 MG/ML
0.6 INJECTION, SOLUTION INTRAMUSCULAR; INTRAVENOUS; SUBCUTANEOUS EVERY 5 MIN PRN
Status: DISCONTINUED | OUTPATIENT
Start: 2023-01-18 | End: 2023-01-18 | Stop reason: HOSPADM

## 2023-01-18 RX ORDER — NEOSTIGMINE METHYLSULFATE 1 MG/ML
INJECTION, SOLUTION INTRAVENOUS AS NEEDED
Status: DISCONTINUED | OUTPATIENT
Start: 2023-01-18 | End: 2023-01-18 | Stop reason: SURG

## 2023-01-18 RX ORDER — FINASTERIDE 5 MG/1
5 TABLET, FILM COATED ORAL NIGHTLY
Status: DISCONTINUED | OUTPATIENT
Start: 2023-01-18 | End: 2023-01-21

## 2023-01-18 RX ORDER — NALOXONE HYDROCHLORIDE 0.4 MG/ML
80 INJECTION, SOLUTION INTRAMUSCULAR; INTRAVENOUS; SUBCUTANEOUS AS NEEDED
Status: DISCONTINUED | OUTPATIENT
Start: 2023-01-18 | End: 2023-01-18 | Stop reason: HOSPADM

## 2023-01-18 RX ORDER — SODIUM PHOSPHATE, DIBASIC AND SODIUM PHOSPHATE, MONOBASIC 7; 19 G/133ML; G/133ML
1 ENEMA RECTAL ONCE AS NEEDED
Status: DISCONTINUED | OUTPATIENT
Start: 2023-01-18 | End: 2023-01-21

## 2023-01-18 RX ORDER — DIPHENHYDRAMINE HCL 25 MG
25 CAPSULE ORAL EVERY 4 HOURS PRN
Status: DISCONTINUED | OUTPATIENT
Start: 2023-01-18 | End: 2023-01-21

## 2023-01-18 RX ORDER — SODIUM CHLORIDE, SODIUM LACTATE, POTASSIUM CHLORIDE, CALCIUM CHLORIDE 600; 310; 30; 20 MG/100ML; MG/100ML; MG/100ML; MG/100ML
INJECTION, SOLUTION INTRAVENOUS CONTINUOUS
Status: DISCONTINUED | OUTPATIENT
Start: 2023-01-18 | End: 2023-01-18

## 2023-01-18 RX ORDER — CELECOXIB 200 MG/1
200 CAPSULE ORAL
Status: DISCONTINUED | OUTPATIENT
Start: 2023-01-19 | End: 2023-01-21

## 2023-01-18 RX ORDER — DOCUSATE SODIUM 100 MG/1
100 CAPSULE, LIQUID FILLED ORAL 2 TIMES DAILY
Status: DISCONTINUED | OUTPATIENT
Start: 2023-01-18 | End: 2023-01-21

## 2023-01-18 RX ORDER — SENNOSIDES 8.6 MG
17.2 TABLET ORAL NIGHTLY
Status: DISCONTINUED | OUTPATIENT
Start: 2023-01-18 | End: 2023-01-21

## 2023-01-18 RX ORDER — MORPHINE SULFATE 4 MG/ML
4 INJECTION, SOLUTION INTRAMUSCULAR; INTRAVENOUS EVERY 10 MIN PRN
Status: DISCONTINUED | OUTPATIENT
Start: 2023-01-18 | End: 2023-01-18 | Stop reason: HOSPADM

## 2023-01-18 RX ORDER — BISACODYL 10 MG
10 SUPPOSITORY, RECTAL RECTAL
Status: DISCONTINUED | OUTPATIENT
Start: 2023-01-18 | End: 2023-01-21

## 2023-01-18 RX ORDER — DIPHENHYDRAMINE HYDROCHLORIDE 50 MG/ML
12.5 INJECTION INTRAMUSCULAR; INTRAVENOUS EVERY 4 HOURS PRN
Status: DISCONTINUED | OUTPATIENT
Start: 2023-01-18 | End: 2023-01-21

## 2023-01-18 RX ORDER — MIDAZOLAM HYDROCHLORIDE 1 MG/ML
INJECTION INTRAMUSCULAR; INTRAVENOUS AS NEEDED
Status: DISCONTINUED | OUTPATIENT
Start: 2023-01-18 | End: 2023-01-18 | Stop reason: SURG

## 2023-01-18 RX ORDER — ACETAMINOPHEN 500 MG
1000 TABLET ORAL ONCE
Status: COMPLETED | OUTPATIENT
Start: 2023-01-18 | End: 2023-01-18

## 2023-01-18 RX ORDER — HYDROMORPHONE HYDROCHLORIDE 1 MG/ML
0.4 INJECTION, SOLUTION INTRAMUSCULAR; INTRAVENOUS; SUBCUTANEOUS EVERY 2 HOUR PRN
Status: DISCONTINUED | OUTPATIENT
Start: 2023-01-18 | End: 2023-01-21

## 2023-01-18 RX ORDER — CEFAZOLIN SODIUM/WATER 2 G/20 ML
2 SYRINGE (ML) INTRAVENOUS EVERY 8 HOURS
Status: COMPLETED | OUTPATIENT
Start: 2023-01-18 | End: 2023-01-19

## 2023-01-18 RX ORDER — MORPHINE SULFATE 4 MG/ML
2 INJECTION, SOLUTION INTRAMUSCULAR; INTRAVENOUS EVERY 10 MIN PRN
Status: DISCONTINUED | OUTPATIENT
Start: 2023-01-18 | End: 2023-01-18 | Stop reason: HOSPADM

## 2023-01-18 RX ORDER — SODIUM CHLORIDE 9 MG/ML
INJECTION, SOLUTION INTRAVENOUS CONTINUOUS PRN
Status: DISCONTINUED | OUTPATIENT
Start: 2023-01-18 | End: 2023-01-18 | Stop reason: SURG

## 2023-01-18 RX ORDER — BUPIVACAINE HYDROCHLORIDE 7.5 MG/ML
INJECTION, SOLUTION INTRASPINAL AS NEEDED
Status: DISCONTINUED | OUTPATIENT
Start: 2023-01-18 | End: 2023-01-18 | Stop reason: SURG

## 2023-01-18 RX ORDER — SODIUM CHLORIDE, SODIUM LACTATE, POTASSIUM CHLORIDE, CALCIUM CHLORIDE 600; 310; 30; 20 MG/100ML; MG/100ML; MG/100ML; MG/100ML
INJECTION, SOLUTION INTRAVENOUS CONTINUOUS
Status: DISCONTINUED | OUTPATIENT
Start: 2023-01-18 | End: 2023-01-21

## 2023-01-18 RX ORDER — ONDANSETRON 2 MG/ML
4 INJECTION INTRAMUSCULAR; INTRAVENOUS EVERY 6 HOURS PRN
Status: DISCONTINUED | OUTPATIENT
Start: 2023-01-18 | End: 2023-01-18 | Stop reason: HOSPADM

## 2023-01-18 RX ORDER — MORPHINE SULFATE 1 MG/ML
INJECTION, SOLUTION EPIDURAL; INTRATHECAL; INTRAVENOUS AS NEEDED
Status: DISCONTINUED | OUTPATIENT
Start: 2023-01-18 | End: 2023-01-18 | Stop reason: SURG

## 2023-01-18 RX ORDER — TEMAZEPAM 15 MG/1
15 CAPSULE ORAL NIGHTLY PRN
Status: DISCONTINUED | OUTPATIENT
Start: 2023-01-18 | End: 2023-01-21

## 2023-01-18 RX ORDER — MORPHINE SULFATE 10 MG/ML
6 INJECTION, SOLUTION INTRAMUSCULAR; INTRAVENOUS EVERY 10 MIN PRN
Status: DISCONTINUED | OUTPATIENT
Start: 2023-01-18 | End: 2023-01-18 | Stop reason: HOSPADM

## 2023-01-18 RX ORDER — GLYCOPYRROLATE 0.2 MG/ML
INJECTION, SOLUTION INTRAMUSCULAR; INTRAVENOUS AS NEEDED
Status: DISCONTINUED | OUTPATIENT
Start: 2023-01-18 | End: 2023-01-18 | Stop reason: SURG

## 2023-01-18 RX ORDER — HYDROMORPHONE HYDROCHLORIDE 1 MG/ML
0.2 INJECTION, SOLUTION INTRAMUSCULAR; INTRAVENOUS; SUBCUTANEOUS EVERY 5 MIN PRN
Status: DISCONTINUED | OUTPATIENT
Start: 2023-01-18 | End: 2023-01-18 | Stop reason: HOSPADM

## 2023-01-18 RX ORDER — FAMOTIDINE 10 MG/ML
20 INJECTION, SOLUTION INTRAVENOUS 2 TIMES DAILY
Status: DISCONTINUED | OUTPATIENT
Start: 2023-01-18 | End: 2023-01-21

## 2023-01-18 RX ORDER — ONDANSETRON 2 MG/ML
4 INJECTION INTRAMUSCULAR; INTRAVENOUS EVERY 6 HOURS PRN
Status: DISCONTINUED | OUTPATIENT
Start: 2023-01-18 | End: 2023-01-21

## 2023-01-18 RX ORDER — LISINOPRIL 40 MG/1
40 TABLET ORAL NIGHTLY
Status: DISCONTINUED | OUTPATIENT
Start: 2023-01-18 | End: 2023-01-21

## 2023-01-18 RX ORDER — LIDOCAINE HYDROCHLORIDE 10 MG/ML
INJECTION, SOLUTION EPIDURAL; INFILTRATION; INTRACAUDAL; PERINEURAL AS NEEDED
Status: DISCONTINUED | OUTPATIENT
Start: 2023-01-18 | End: 2023-01-18 | Stop reason: SURG

## 2023-01-18 RX ORDER — DEXAMETHASONE SODIUM PHOSPHATE 4 MG/ML
VIAL (ML) INJECTION AS NEEDED
Status: DISCONTINUED | OUTPATIENT
Start: 2023-01-18 | End: 2023-01-18 | Stop reason: SURG

## 2023-01-18 RX ORDER — ONDANSETRON 2 MG/ML
INJECTION INTRAMUSCULAR; INTRAVENOUS AS NEEDED
Status: DISCONTINUED | OUTPATIENT
Start: 2023-01-18 | End: 2023-01-18

## 2023-01-18 RX ORDER — KETOROLAC TROMETHAMINE 5 MG/ML
1 SOLUTION OPHTHALMIC 4 TIMES DAILY PRN
Status: DISCONTINUED | OUTPATIENT
Start: 2023-01-18 | End: 2023-01-21

## 2023-01-18 RX ORDER — OXYCODONE AND ACETAMINOPHEN 7.5; 325 MG/1; MG/1
1 TABLET ORAL EVERY 4 HOURS PRN
Status: DISCONTINUED | OUTPATIENT
Start: 2023-01-18 | End: 2023-01-21

## 2023-01-18 RX ORDER — AMLODIPINE BESYLATE 2.5 MG/1
2.5 TABLET ORAL DAILY
Status: DISCONTINUED | OUTPATIENT
Start: 2023-01-19 | End: 2023-01-21

## 2023-01-18 RX ORDER — HYDROMORPHONE HYDROCHLORIDE 1 MG/ML
0.4 INJECTION, SOLUTION INTRAMUSCULAR; INTRAVENOUS; SUBCUTANEOUS EVERY 5 MIN PRN
Status: DISCONTINUED | OUTPATIENT
Start: 2023-01-18 | End: 2023-01-18 | Stop reason: HOSPADM

## 2023-01-18 RX ORDER — ONDANSETRON 2 MG/ML
INJECTION INTRAMUSCULAR; INTRAVENOUS AS NEEDED
Status: DISCONTINUED | OUTPATIENT
Start: 2023-01-18 | End: 2023-01-18 | Stop reason: SURG

## 2023-01-18 RX ORDER — CEFAZOLIN SODIUM/WATER 2 G/20 ML
2 SYRINGE (ML) INTRAVENOUS ONCE
Status: COMPLETED | OUTPATIENT
Start: 2023-01-18 | End: 2023-01-18

## 2023-01-18 RX ADMIN — SODIUM CHLORIDE, SODIUM LACTATE, POTASSIUM CHLORIDE, CALCIUM CHLORIDE: 600; 310; 30; 20 INJECTION, SOLUTION INTRAVENOUS at 07:41:00

## 2023-01-18 RX ADMIN — ROCURONIUM BROMIDE 10 MG: 10 INJECTION, SOLUTION INTRAVENOUS at 07:51:00

## 2023-01-18 RX ADMIN — ROCURONIUM BROMIDE 20 MG: 10 INJECTION, SOLUTION INTRAVENOUS at 07:58:00

## 2023-01-18 RX ADMIN — CEFAZOLIN SODIUM/WATER 2 G: 2 G/20 ML SYRINGE (ML) INTRAVENOUS at 11:23:00

## 2023-01-18 RX ADMIN — BUPIVACAINE HYDROCHLORIDE 1.6 ML: 7.5 INJECTION, SOLUTION INTRASPINAL at 07:47:00

## 2023-01-18 RX ADMIN — MIDAZOLAM HYDROCHLORIDE 1 MG: 1 INJECTION INTRAMUSCULAR; INTRAVENOUS at 07:41:00

## 2023-01-18 RX ADMIN — MIDAZOLAM HYDROCHLORIDE 1 MG: 1 INJECTION INTRAMUSCULAR; INTRAVENOUS at 07:46:00

## 2023-01-18 RX ADMIN — DEXAMETHASONE SODIUM PHOSPHATE 4 MG: 4 MG/ML VIAL (ML) INJECTION at 08:07:00

## 2023-01-18 RX ADMIN — ONDANSETRON 4 MG: 2 INJECTION INTRAMUSCULAR; INTRAVENOUS at 11:09:00

## 2023-01-18 RX ADMIN — NEOSTIGMINE METHYLSULFATE 2 MG: 1 INJECTION, SOLUTION INTRAVENOUS at 11:09:00

## 2023-01-18 RX ADMIN — PHENYLEPHRINE HCL 100 MCG: 10 MG/ML VIAL (ML) INJECTION at 08:01:00

## 2023-01-18 RX ADMIN — SODIUM CHLORIDE, SODIUM LACTATE, POTASSIUM CHLORIDE, CALCIUM CHLORIDE: 600; 310; 30; 20 INJECTION, SOLUTION INTRAVENOUS at 09:29:00

## 2023-01-18 RX ADMIN — EPHEDRINE SULFATE 10 MG: 50 INJECTION INTRAVENOUS at 09:02:00

## 2023-01-18 RX ADMIN — LIDOCAINE HYDROCHLORIDE 5 MG: 10 INJECTION, SOLUTION EPIDURAL; INFILTRATION; INTRACAUDAL; PERINEURAL at 07:44:00

## 2023-01-18 RX ADMIN — PHENYLEPHRINE HCL 100 MCG: 10 MG/ML VIAL (ML) INJECTION at 08:30:00

## 2023-01-18 RX ADMIN — SODIUM CHLORIDE: 9 INJECTION, SOLUTION INTRAVENOUS at 07:53:00

## 2023-01-18 RX ADMIN — LIDOCAINE HYDROCHLORIDE 50 MG: 10 INJECTION, SOLUTION EPIDURAL; INFILTRATION; INTRACAUDAL; PERINEURAL at 07:51:00

## 2023-01-18 RX ADMIN — MORPHINE SULFATE 0.3 MG: 1 INJECTION, SOLUTION EPIDURAL; INTRATHECAL; INTRAVENOUS at 07:47:00

## 2023-01-18 RX ADMIN — TRANEXAMIC ACID 1000 MG: 10 INJECTION, SOLUTION INTRAVENOUS at 10:27:00

## 2023-01-18 RX ADMIN — EPHEDRINE SULFATE 10 MG: 50 INJECTION INTRAVENOUS at 08:51:00

## 2023-01-18 RX ADMIN — GLYCOPYRROLATE 0.4 MG: 0.2 INJECTION, SOLUTION INTRAMUSCULAR; INTRAVENOUS at 11:09:00

## 2023-01-18 RX ADMIN — CEFAZOLIN SODIUM/WATER 2 G: 2 G/20 ML SYRINGE (ML) INTRAVENOUS at 07:53:00

## 2023-01-18 RX ADMIN — EPHEDRINE SULFATE 10 MG: 50 INJECTION INTRAVENOUS at 08:39:00

## 2023-01-18 RX ADMIN — TRANEXAMIC ACID 1000 MG: 10 INJECTION, SOLUTION INTRAVENOUS at 08:05:00

## 2023-01-18 NOTE — BRIEF OP NOTE
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 Procedure Performed:  Right total hip arthroplasty    Anesthesia: General with spinal Duramorph    Assistants: Surgical Assistant.: Janna Gracia      EBL:  Blood Output: Less than or equal to 100 mL (1/18/2023 10:57 AM)      Specimens:   ID Type Source Tests Collected by Time Destination   1 : 1.  Right hip bone and tissue Tissue Hip, right SURGICAL PATHOLOGY TISSUE Melania Warren MD 1/18/2023  5:26 AM        Complications: None    Surgical Findings: See above; advanced avascular necrosis and osteoarthritis with acetabular dysplasia    Carlos Eduardo Robles MD  1/18/2023  11:17 AM

## 2023-01-18 NOTE — ANESTHESIA PROCEDURE NOTES
Spinal Block    Date/Time: 1/18/2023 7:44 AM  Performed by: Abel Horner CRNA  Authorized by: Abel Horner CRNA       General Information and Staff    Start Time:  1/18/2023 7:44 AM  End Time:  1/18/2023 7:47 AM  Anesthesiologist:  Kate Cortez MD  CRNA:  Abel Horner CRNA  Performed by:  CRNA  Patient Location:  OR  Site identification: surface landmarks    Reason for Block: at surgeon's request, post-op pain management and surgical anesthesia    Preanesthetic Checklist: patient identified, IV checked, risks and benefits discussed, monitors and equipment checked, pre-op evaluation, timeout performed, anesthesia consent and sterile technique used      Procedure Details    Patient Position:  Sitting  Prep: ChloraPrep and patient draped    Monitoring:  Cardiac monitor and continuous pulse ox  Approach:  Midline  Location:  L3-4  Injection Technique:  Single-shot    Needle    Needle Type:  Pencil-tip  Needle Gauge:  24 G  Needle Length:  3.5 in    Assessment    Sensory Level:  T8  Events: clear CSF, CSF aspirated, well tolerated and blood negative      Additional Comments

## 2023-01-18 NOTE — ANESTHESIA PROCEDURE NOTES
Peripheral IV  Date/Time: 1/18/2023 7:53 AM  Inserted by: Sandeep Amato CRNA    Placement  Needle size: 18 G  Laterality: right  Location: wrist  Local anesthetic: none  Site prep: alcohol  Technique: anatomical landmarks  Attempts: 1

## 2023-01-18 NOTE — ANESTHESIA PROCEDURE NOTES
Airway  Date/Time: 1/18/2023 7:52 AM  Urgency: Elective    Airway not difficult    General Information and Staff    Patient location during procedure: OR  Anesthesiologist: Vlad Benito MD  Resident/CRNA: Marc Castillo CRNA  Performed: anesthesiologist and CRNA     Indications and Patient Condition  Indications for airway management: anesthesia  Sedation level: deep  Preoxygenated: yes  Patient position: sniffing  Mask difficulty assessment: 1 - vent by mask    Final Airway Details  Final airway type: endotracheal airway      Successful airway: ETT  Cuffed: yes   Successful intubation technique: direct laryngoscopy  Facilitating devices/methods: intubating stylet  Blade: Kavin  Blade size: #4  ETT size (mm): 7.5    Cormack-Lehane Classification: grade I - full view of glottis  Placement verified by: chest auscultation and capnometry   Cuff volume (mL): 7  Measured from: teeth  ETT to teeth (cm): 22  Number of attempts at approach: 1    Additional Comments  Atraumatic. 9.0 opa

## 2023-01-18 NOTE — INTERVAL H&P NOTE
Pre-op Diagnosis: Primary osteoarthritis of right hip    The above referenced H&P was reviewed by Nicky Jefferson MD on 1/18/2023, the patient was examined and no significant changes have occurred in the patient's condition since the H&P was performed. I discussed with the patient and/or legal representative the potential benefits, risks and side effects of this procedure; the likelihood of the patient achieving goals; and potential problems that might occur during recuperation. I discussed reasonable alternatives to the procedure, including risks, benefits and side effects related to the alternatives and risks related to not receiving this procedure. We will proceed with procedure as planned.

## 2023-01-19 LAB
ANION GAP SERPL CALC-SCNC: 8 MMOL/L (ref 0–18)
BUN BLD-MCNC: 22 MG/DL (ref 7–18)
BUN/CREAT SERPL: 25.9 (ref 10–20)
CALCIUM BLD-MCNC: 8.3 MG/DL (ref 8.5–10.1)
CHLORIDE SERPL-SCNC: 105 MMOL/L (ref 98–112)
CO2 SERPL-SCNC: 27 MMOL/L (ref 21–32)
CREAT BLD-MCNC: 0.85 MG/DL
GFR SERPLBLD BASED ON 1.73 SQ M-ARVRAT: 92 ML/MIN/1.73M2 (ref 60–?)
GLUCOSE BLD-MCNC: 141 MG/DL (ref 70–99)
HCT VFR BLD AUTO: 33.5 %
HGB BLD-MCNC: 11.4 G/DL
OSMOLALITY SERPL CALC.SUM OF ELEC: 296 MOSM/KG (ref 275–295)
POTASSIUM SERPL-SCNC: 3.8 MMOL/L (ref 3.5–5.1)
SODIUM SERPL-SCNC: 140 MMOL/L (ref 136–145)

## 2023-01-19 PROCEDURE — 99233 SBSQ HOSP IP/OBS HIGH 50: CPT | Performed by: HOSPITALIST

## 2023-01-19 RX ORDER — TAMSULOSIN HYDROCHLORIDE 0.4 MG/1
0.4 CAPSULE ORAL
Status: DISCONTINUED | OUTPATIENT
Start: 2023-01-20 | End: 2023-01-21

## 2023-01-19 NOTE — PLAN OF CARE
Patient is alert and oriented. Dressing, Hemovac and Abductor pillow in place. Hearing Aids at bedside. Robles in place, removing per protocol. IV fluids running. Ice applied as needed. Scheduled Tramadol given for pain management. Ancef administered. Call light within reach. Frequent rounding done.      Problem: Patient Centered Care  Goal: Patient preferences are identified and integrated in the patient's plan of care  Description: Interventions:  - What would you like us to know as we care for you?   - Provide timely, complete, and accurate information to patient/family  - Incorporate patient and family knowledge, values, beliefs, and cultural backgrounds into the planning and delivery of care  - Encourage patient/family to participate in care and decision-making at the level they choose  - Honor patient and family perspectives and choices  Outcome: Progressing     Problem: PAIN - ADULT  Goal: Verbalizes/displays adequate comfort level or patient's stated pain goal  Description: INTERVENTIONS:  - Encourage pt to monitor pain and request assistance  - Assess pain using appropriate pain scale  - Administer analgesics based on type and severity of pain and evaluate response  - Implement non-pharmacological measures as appropriate and evaluate response  - Consider cultural and social influences on pain and pain management  - Manage/alleviate anxiety  - Utilize distraction and/or relaxation techniques  - Monitor for opioid side effects  - Notify MD/LIP if interventions unsuccessful or patient reports new pain  - Anticipate increased pain with activity and pre-medicate as appropriate  Outcome: Progressing

## 2023-01-19 NOTE — CM/SW NOTE
Department  notified of request for [facility], osorio referrals started. Assigned CM/SW to follow up with pt/family on further discharge planning.      Annelise Hartley   January 19, 2023   12:02

## 2023-01-19 NOTE — PLAN OF CARE
Ainsley Thornton is up with walker and 1 assist. Sahni was removed this am, unable to void. Bladder scan showed >700, Dr. Elton Oropeza ordered sahni to be inserted and flomax to start. Taking scheduled tramadol for pain. Hemovac and surgical dressing in place to right hip. Plan for home with home health when cleared for discharge. Problem: PAIN - ADULT  Goal: Verbalizes/displays adequate comfort level or patient's stated pain goal  Description: INTERVENTIONS:  - Encourage pt to monitor pain and request assistance  - Assess pain using appropriate pain scale  - Administer analgesics based on type and severity of pain and evaluate response  - Implement non-pharmacological measures as appropriate and evaluate response  - Consider cultural and social influences on pain and pain management  - Manage/alleviate anxiety  - Utilize distraction and/or relaxation techniques  - Monitor for opioid side effects  - Notify MD/LIP if interventions unsuccessful or patient reports new pain  - Anticipate increased pain with activity and pre-medicate as appropriate  Outcome: Progressing     Problem: SAFETY ADULT - FALL  Goal: Free from fall injury  Description: INTERVENTIONS:  - Assess pt frequently for physical needs  - Identify cognitive and physical deficits and behaviors that affect risk of falls.   - [place] fall precautions as indicated by assessment.  - Educate pt/family on patient safety including physical limitations  - Instruct pt to call for assistance with activity based on assessment  - Modify environment to reduce risk of injury  - Provide assistive devices as appropriate  - Consider OT/PT consult to assist with strengthening/mobility  - Encourage toileting schedule  Outcome: Progressing     Problem: Impaired Functional Mobility  Goal: Achieve highest/safest level of mobility/gait  Description: Interventions:  - Assess patient's functional ability and stability  - Promote increasing activity/tolerance for mobility and gait  - Educate and engage patient/family in tolerated activity level and precautions  - Recommend use of  RW for transfers and ambulation  Outcome: Progressing

## 2023-01-20 LAB
BASOPHILS # BLD AUTO: 0.03 X10(3) UL (ref 0–0.2)
BASOPHILS NFR BLD AUTO: 0.2 %
DEPRECATED RDW RBC AUTO: 45 FL (ref 35.1–46.3)
EOSINOPHIL # BLD AUTO: 0 X10(3) UL (ref 0–0.7)
EOSINOPHIL NFR BLD AUTO: 0 %
ERYTHROCYTE [DISTWIDTH] IN BLOOD BY AUTOMATED COUNT: 13.2 % (ref 11–15)
HCT VFR BLD AUTO: 32.7 %
HGB BLD-MCNC: 11.2 G/DL
IMM GRANULOCYTES # BLD AUTO: 0.05 X10(3) UL (ref 0–1)
IMM GRANULOCYTES NFR BLD: 0.4 %
LYMPHOCYTES # BLD AUTO: 0.85 X10(3) UL (ref 1–4)
LYMPHOCYTES NFR BLD AUTO: 6.1 %
MCH RBC QN AUTO: 31.6 PG (ref 26–34)
MCHC RBC AUTO-ENTMCNC: 34.3 G/DL (ref 31–37)
MCV RBC AUTO: 92.4 FL
MONOCYTES # BLD AUTO: 0.92 X10(3) UL (ref 0.1–1)
MONOCYTES NFR BLD AUTO: 6.6 %
NEUTROPHILS # BLD AUTO: 12.11 X10 (3) UL (ref 1.5–7.7)
NEUTROPHILS # BLD AUTO: 12.11 X10(3) UL (ref 1.5–7.7)
NEUTROPHILS NFR BLD AUTO: 86.7 %
PLATELET # BLD AUTO: 194 10(3)UL (ref 150–450)
RBC # BLD AUTO: 3.54 X10(6)UL
WBC # BLD AUTO: 14 X10(3) UL (ref 4–11)

## 2023-01-20 PROCEDURE — 99233 SBSQ HOSP IP/OBS HIGH 50: CPT | Performed by: HOSPITALIST

## 2023-01-20 NOTE — CM/SW NOTE
01/20/23 1400   CM/SW Referral Data   Referral Source Physician   Reason for Referral Discharge planning   Informant Patient   Pertinent Medical Hx   Does patient have an established PCP? Yes  Melina Gold)   Patient Info   Advanced directives? Yes  Young Mario)   Patient's Current Mental Status at Time of Assessment Alert;Oriented   Patient's [address]   Number of Levels in Home 2   Number of Stair in Home 4 external steps  (Pt will be on 1st floor)   Patient lives with Spouse/Significant other   Patient Status Prior to Admission   Independent with ADLs and Mobility Yes   Choice of Post-Acute Provider   Informed patient of right to choose their preferred provider Yes   SW intern met with pt at bedside to complete above assessment. Pt confirmed address on face sheet as correct. Pt lives with spouse and states that he is independent with his ADL's and does not use anything to ambulate at baseline. Pt only has access to a walker. Pt still drives. Pt denied wearing O2 at home and denied dialysis. Pt is agreeable to [facility]. PLAN: SW intern provided [facility] list to pt. Pending pt's choice.      Social Work Intern  Odette'azalea Herrera

## 2023-01-20 NOTE — CDS QUERY
.To answer this query: 1st click on \"Edit\" button on toolbar. 2nd type an \"X\" in the bracket for the diagnosis(s) that apply. (You may add/type in any additional clinical details you feel necessary to substantiate your response). 3rd Click \"SIGN\" TAB to complete your response. Thank you. .Clinical Significance - Lab Results Associated with Blood Loss  CLINICAL DOCUMENTATION CLARIFICATION FORM  Dear Doctor Guillermina Finder:   Clinical information (provided below) indicates blood loss and abnormal blood counts. For accurate ICD-10-CM code assignment to reflect severity of illness and risk of mortality,  PLEASE (X) ALL DIAGNOSES THAT APPLY. SELECTION BY PROVIDER ONLY    ( X)  Postoperative Anemia due to Acute Blood Loss    (  )  Acute Blood Loss Anemia    (  )  Low HGB/HCT Without Clinical Significance    (  )  Other Explanation, (please specify): _____________________       1  Documentation from the Medical Record:  68 yr old male with history of  Benign prostatic hypertrophy, Degenerative joint disease of lumbar spine. High blood pressure, Neuropathy of both feet, Skin cancer of nose 11/2016 01/18/23 Anesthesia Pre-op Note:12/28/22 HGB/HCT: 13.81/47.3    01/18/23 Right total hip arthroplasty EBL: Blood Output: Less than or equal to 100 mL    01/18/23  Post-op: HGB/HCT 13.8/41.5     01/19/23 HGB/HCT 11.4/33.5     01/20/23 HGB/HCT 11.2/32.7     Treatment: Daily HGB/HCT,  01/18 Ortho Surgeon ordered Ferrous Sulfate Daily with breakfast     _____________________________________________________________________________________  If you have any questions, please contact Clinical Documentation  Specialist:  Rafi Leger RN at [phone]     Thank You.     THIS FORM IS A PERMANENT PART OF THE MEDICAL RECORD    ,

## 2023-01-20 NOTE — PLAN OF CARE
Patient is POD #2 of a R LUZ. Aox4 on RA. Dressing to R knee - CDI. Baseline bilat. Neuropathy. 1 assist with a walker. WBAT. SCDs and Eliquis for DVT prophylaxis. Hemovac drain in place. General diet. Voiding via sahni to be removed at 6a. Brooke Tramadol given for pain. Plan for home with [facility] when med clear. Problem: Patient Centered Care  Goal: Patient preferences are identified and integrated in the patient's plan of care  Description: Interventions:  - What would you like us to know as we care for you?   - Provide timely, complete, and accurate information to patient/family  - Incorporate patient and family knowledge, values, beliefs, and cultural backgrounds into the planning and delivery of care  - Encourage patient/family to participate in care and decision-making at the level they choose  - Honor patient and family perspectives and choices  Outcome: Progressing     Problem: PAIN - ADULT  Goal: Verbalizes/displays adequate comfort level or patient's stated pain goal  Description: INTERVENTIONS:  - Encourage pt to monitor pain and request assistance  - Assess pain using appropriate pain scale  - Administer analgesics based on type and severity of pain and evaluate response  - Implement non-pharmacological measures as appropriate and evaluate response  - Consider cultural and social influences on pain and pain management  - Manage/alleviate anxiety  - Utilize distraction and/or relaxation techniques  - Monitor for opioid side effects  - Notify MD/LIP if interventions unsuccessful or patient reports new pain  - Anticipate increased pain with activity and pre-medicate as appropriate  Outcome: Progressing     Problem: SAFETY ADULT - FALL  Goal: Free from fall injury  Description: INTERVENTIONS:  - Assess pt frequently for physical needs  - Identify cognitive and physical deficits and behaviors that affect risk of falls.   - [place] fall precautions as indicated by assessment.  - Educate pt/family on patient safety including physical limitations  - Instruct pt to call for assistance with activity based on assessment  - Modify environment to reduce risk of injury  - Provide assistive devices as appropriate  - Consider OT/PT consult to assist with strengthening/mobility  - Encourage toileting schedule  Outcome: Progressing     Problem: Impaired Functional Mobility  Goal: Achieve highest/safest level of mobility/gait  Description: Interventions:  - Assess patient's functional ability and stability  - Promote increasing activity/tolerance for mobility and gait  - Educate and engage patient/family in tolerated activity level and precautions  Outcome: Progressing

## 2023-01-21 VITALS
HEIGHT: 69 IN | WEIGHT: 158 LBS | DIASTOLIC BLOOD PRESSURE: 54 MMHG | SYSTOLIC BLOOD PRESSURE: 141 MMHG | HEART RATE: 76 BPM | BODY MASS INDEX: 23.4 KG/M2 | RESPIRATION RATE: 18 BRPM | OXYGEN SATURATION: 98 % | TEMPERATURE: 98 F

## 2023-01-21 LAB
ANION GAP SERPL CALC-SCNC: 4 MMOL/L (ref 0–18)
BUN BLD-MCNC: 20 MG/DL (ref 7–18)
BUN/CREAT SERPL: 31.3 (ref 10–20)
CALCIUM BLD-MCNC: 7.5 MG/DL (ref 8.5–10.1)
CHLORIDE SERPL-SCNC: 97 MMOL/L (ref 98–112)
CO2 SERPL-SCNC: 31 MMOL/L (ref 21–32)
CREAT BLD-MCNC: 0.64 MG/DL
DEPRECATED RDW RBC AUTO: 43.6 FL (ref 35.1–46.3)
ERYTHROCYTE [DISTWIDTH] IN BLOOD BY AUTOMATED COUNT: 13 % (ref 11–15)
GFR SERPLBLD BASED ON 1.73 SQ M-ARVRAT: 100 ML/MIN/1.73M2 (ref 60–?)
GLUCOSE BLD-MCNC: 105 MG/DL (ref 70–99)
HCT VFR BLD AUTO: 28.8 %
HGB BLD-MCNC: 9.9 G/DL
MCH RBC QN AUTO: 31.7 PG (ref 26–34)
MCHC RBC AUTO-ENTMCNC: 34.4 G/DL (ref 31–37)
MCV RBC AUTO: 92.3 FL
OSMOLALITY SERPL CALC.SUM OF ELEC: 277 MOSM/KG (ref 275–295)
PLATELET # BLD AUTO: 164 10(3)UL (ref 150–450)
POTASSIUM SERPL-SCNC: 3.4 MMOL/L (ref 3.5–5.1)
RBC # BLD AUTO: 3.12 X10(6)UL
SODIUM SERPL-SCNC: 132 MMOL/L (ref 136–145)
WBC # BLD AUTO: 9.4 X10(3) UL (ref 4–11)

## 2023-01-21 RX ORDER — TAMSULOSIN HYDROCHLORIDE 0.4 MG/1
0.4 CAPSULE ORAL DAILY
Qty: 30 CAPSULE | Refills: 0 | Status: SHIPPED | OUTPATIENT
Start: 2023-01-21 | End: 2023-02-20

## 2023-01-21 RX ORDER — POTASSIUM CHLORIDE 1.5 G/1.77G
40 POWDER, FOR SOLUTION ORAL ONCE
Status: COMPLETED | OUTPATIENT
Start: 2023-01-21 | End: 2023-01-21

## 2023-01-21 RX ORDER — HYDROCODONE BITARTRATE AND ACETAMINOPHEN 7.5; 325 MG/1; MG/1
1 TABLET ORAL EVERY 4 HOURS PRN
Qty: 50 TABLET | Refills: 0 | Status: SHIPPED | OUTPATIENT
Start: 2023-01-21

## 2023-01-21 RX ORDER — TRAMADOL HYDROCHLORIDE 50 MG/1
50 TABLET ORAL EVERY 6 HOURS PRN
Qty: 50 TABLET | Refills: 0 | Status: SHIPPED | OUTPATIENT
Start: 2023-01-21

## 2023-01-21 NOTE — PLAN OF CARE
Patient is alert and oriented. Glasses and Hearing Aids at bedside. Dressings in place and dressing from hemovac removal site, changed. Voiding. Lidocaine patch removed. Scheduled Tramadol given for pain management. Flexeril on hold per order. Saline locked. No nausea meds given through the night. Call light within reach. Frequent rounding done. Problem: Patient Centered Care  Goal: Patient preferences are identified and integrated in the patient's plan of care  Description: Interventions:  - What would you like us to know as we care for you?   - Provide timely, complete, and accurate information to patient/family  - Incorporate patient and family knowledge, values, beliefs, and cultural backgrounds into the planning and delivery of care  - Encourage patient/family to participate in care and decision-making at the level they choose  - Honor patient and family perspectives and choices  Outcome: Progressing     Problem: PAIN - ADULT  Goal: Verbalizes/displays adequate comfort level or patient's stated pain goal  Description: INTERVENTIONS:  - Encourage pt to monitor pain and request assistance  - Assess pain using appropriate pain scale  - Administer analgesics based on type and severity of pain and evaluate response  - Implement non-pharmacological measures as appropriate and evaluate response  - Consider cultural and social influences on pain and pain management  - Manage/alleviate anxiety  - Utilize distraction and/or relaxation techniques  - Monitor for opioid side effects  - Notify MD/LIP if interventions unsuccessful or patient reports new pain  - Anticipate increased pain with activity and pre-medicate as appropriate  Outcome: Progressing     Problem: SAFETY ADULT - FALL  Goal: Free from fall injury  Description: INTERVENTIONS:  - Assess pt frequently for physical needs  - Identify cognitive and physical deficits and behaviors that affect risk of falls.   - [place] fall precautions as indicated by assessment.  - Educate pt/family on patient safety including physical limitations  - Instruct pt to call for assistance with activity based on assessment  - Modify environment to reduce risk of injury  - Provide assistive devices as appropriate  - Consider OT/PT consult to assist with strengthening/mobility  - Encourage toileting schedule  Outcome: Progressing     Problem: Impaired Functional Mobility  Goal: Achieve highest/safest level of mobility/gait  Description: Interventions:  - Assess patient's functional ability and stability  - Promote increasing activity/tolerance for mobility and gait  - Educate and engage patient/family in tolerated activity level and precautions  Outcome: Progressing

## 2023-01-21 NOTE — DISCHARGE INSTRUCTIONS
The patient will call for an appointment in the next 2 weeks for follow up    The patient has been instructed on wound care and may shower if wound is clean and dry. Your dressing is good for 7 days. After 7 days, please remove and place new dressing as given by RN. The patient has been instructed to contact the office if increasing drainage, redness, or swelling to the operative wound/extremity occurs. ?Similarly, if they develop fevers and chills they should call the office ASAP. The patient will continue to wear SONIDO hose to both legs for 3 weeks. ? They may remove them at night or if they are causing skin irritation. ?Prescribed DVT prophylaxis should be taken for 4 weeks after discharge (as written on prescription). ?Oral pain medications have been provided and instruction on administration given to the patient. The patient will resume a normal diet. ? They should anticipate a slight decrease in appetite after surgery, but should notify the office if there are any problems with nausea, vomiting, constipation or diarrhea. ?A stool softner has been ordered and should be taken regularly while on pain medications. Take Aspirin 325 two times a day for 30 days.

## 2023-01-21 NOTE — PLAN OF CARE
Post-op day #2. Dressing in place to right hip. Hemovac removed by surgeon. Ice as needed. Abductor pillow in place when in bed. Monitoring vital signs- BP low this afternoon, bolus given. No acute changes noted throughout shift. Tolerating diet, nausea improved today. Miralax given. Voiding freely. SCDs, teds, and eliquis for DVT prophylaxis. Scheduled tramadol for pain. Up with standby assist and a walker. Encouraged frequent ambulation and use of incentive spirometer. Fall precautions maintained- bed alarm on, bed locked in lowest position, call light and personal belongings within reach, non-skid socks in place to bilateral feet. Frequent rounding by nursing staff. Plan is home with home health tomorrow when medically cleared. Addressed additional questions. Patient's wife at bedside, updated on plan of care.      Problem: Patient Centered Care  Goal: Patient preferences are identified and integrated in the patient's plan of care  Description: Interventions:  - What would you like us to know as we care for you?   - Provide timely, complete, and accurate information to patient/family  - Incorporate patient and family knowledge, values, beliefs, and cultural backgrounds into the planning and delivery of care  - Encourage patient/family to participate in care and decision-making at the level they choose  - Honor patient and family perspectives and choices  Outcome: Progressing     Problem: PAIN - ADULT  Goal: Verbalizes/displays adequate comfort level or patient's stated pain goal  Description: INTERVENTIONS:  - Encourage pt to monitor pain and request assistance  - Assess pain using appropriate pain scale  - Administer analgesics based on type and severity of pain and evaluate response  - Implement non-pharmacological measures as appropriate and evaluate response  - Consider cultural and social influences on pain and pain management  - Manage/alleviate anxiety  - Utilize distraction and/or relaxation techniques  - Monitor for opioid side effects  - Notify MD/LIP if interventions unsuccessful or patient reports new pain  - Anticipate increased pain with activity and pre-medicate as appropriate  Outcome: Progressing     Problem: SAFETY ADULT - FALL  Goal: Free from fall injury  Description: INTERVENTIONS:  - Assess pt frequently for physical needs  - Identify cognitive and physical deficits and behaviors that affect risk of falls.   - Nicholson fall precautions as indicated by assessment.  - Educate pt/family on patient safety including physical limitations  - Instruct pt to call for assistance with activity based on assessment  - Modify environment to reduce risk of injury  - Provide assistive devices as appropriate  - Consider OT/PT consult to assist with strengthening/mobility  - Encourage toileting schedule  Outcome: Progressing     Problem: Impaired Functional Mobility  Goal: Achieve highest/safest level of mobility/gait  Description: Interventions:  - Assess patient's functional ability and stability  - Promote increasing activity/tolerance for mobility and gait  - Educate and engage patient/family in tolerated activity level and precautions  Outcome: Progressing     Problem: Patient/Family Goals  Goal: Patient/Family Long Term Goal  Description: Patient's Long Term Goal: to go home    Interventions:  - follow MD orders  - update patient and family on plan of care  - discharge planning  - PT/OT  - See additional Care Plan goals for specific interventions  Outcome: Progressing  Goal: Patient/Family Short Term Goal  Description: Patient's Short Term Goal: to have less pain    Interventions:   - pain medications as needed  - non-pharmacologic pain interventions  - PT/OT  - ambulate  - See additional Care Plan goals for specific interventions  Outcome: Progressing

## 2023-01-23 ENCOUNTER — PATIENT OUTREACH (OUTPATIENT)
Dept: CASE MANAGEMENT | Age: 73
End: 2023-01-23

## 2023-01-23 ENCOUNTER — PATIENT MESSAGE (OUTPATIENT)
Dept: INTERNAL MEDICINE CLINIC | Facility: CLINIC | Age: 73
End: 2023-01-23

## 2023-01-23 DIAGNOSIS — Z02.9 ENCOUNTERS FOR UNSPECIFIED ADMINISTRATIVE PURPOSE: ICD-10-CM

## 2023-01-23 PROCEDURE — 1111F DSCHRG MED/CURRENT MED MERGE: CPT

## 2023-01-24 NOTE — CM/SW NOTE
Yasmeen Rodgers has been contacted by the pt's ortho office and has been set up. [facility] spoke with the pt. And will be starting care today 1/24.     Hazel Lindquist, [facility] ext [phone]

## 2023-01-30 ENCOUNTER — OFFICE VISIT (OUTPATIENT)
Dept: INTERNAL MEDICINE CLINIC | Facility: CLINIC | Age: 73
End: 2023-01-30
Payer: MEDICARE

## 2023-01-30 VITALS
HEART RATE: 74 BPM | BODY MASS INDEX: 25.48 KG/M2 | OXYGEN SATURATION: 100 % | WEIGHT: 172 LBS | DIASTOLIC BLOOD PRESSURE: 88 MMHG | HEIGHT: 69 IN | SYSTOLIC BLOOD PRESSURE: 138 MMHG

## 2023-01-30 DIAGNOSIS — N40.1 BPH WITH OBSTRUCTION/LOWER URINARY TRACT SYMPTOMS: ICD-10-CM

## 2023-01-30 DIAGNOSIS — M87.051 AVASCULAR NECROSIS OF BONE OF RIGHT HIP (HCC): Primary | ICD-10-CM

## 2023-01-30 DIAGNOSIS — N13.8 BPH WITH OBSTRUCTION/LOWER URINARY TRACT SYMPTOMS: ICD-10-CM

## 2023-01-30 DIAGNOSIS — I10 ESSENTIAL HYPERTENSION: ICD-10-CM

## 2023-02-06 ENCOUNTER — HOSPITAL ENCOUNTER (OUTPATIENT)
Dept: ULTRASOUND IMAGING | Facility: HOSPITAL | Age: 73
Discharge: HOME | End: 2023-02-06
Attending: ORTHOPAEDIC SURGERY
Payer: MEDICARE

## 2023-02-06 DIAGNOSIS — M79.89 CALF SWELLING: ICD-10-CM

## 2023-02-06 PROBLEM — Z96.641 HISTORY OF TOTAL HIP ARTHROPLASTY, RIGHT: Status: ACTIVE | Noted: 2023-02-06

## 2023-02-06 PROCEDURE — 93971 EXTREMITY STUDY: CPT | Performed by: ORTHOPAEDIC SURGERY

## 2023-03-21 PROBLEM — M75.41 IMPINGEMENT SYNDROME OF RIGHT SHOULDER: Status: RESOLVED | Noted: 2020-02-04 | Resolved: 2023-03-21

## 2023-03-21 PROBLEM — M48.062 SPINAL STENOSIS OF LUMBAR REGION WITH NEUROGENIC CLAUDICATION: Status: RESOLVED | Noted: 2020-05-05 | Resolved: 2023-03-21

## 2023-03-21 PROBLEM — R55 SYNCOPE AND COLLAPSE: Status: RESOLVED | Noted: 2018-04-05 | Resolved: 2023-03-21

## 2023-03-21 PROBLEM — M25.551 RIGHT HIP PAIN: Status: RESOLVED | Noted: 2022-11-10 | Resolved: 2023-03-21

## 2023-03-21 PROBLEM — D72.829 LEUKOCYTOSIS, UNSPECIFIED TYPE: Status: RESOLVED | Noted: 2018-04-05 | Resolved: 2023-03-21

## 2023-03-21 PROBLEM — E87.1 HYPONATREMIA: Status: RESOLVED | Noted: 2018-04-05 | Resolved: 2023-03-21

## 2023-03-21 PROBLEM — M16.11 PRIMARY OSTEOARTHRITIS OF RIGHT HIP: Status: RESOLVED | Noted: 2022-11-10 | Resolved: 2023-03-21

## 2023-03-21 PROBLEM — M25.511 ACUTE PAIN OF RIGHT SHOULDER: Status: RESOLVED | Noted: 2020-02-04 | Resolved: 2023-03-21

## 2023-03-21 PROBLEM — L82.0 INFLAMED SEBORRHEIC KERATOSIS: Status: RESOLVED | Noted: 2022-01-27 | Resolved: 2023-03-21

## 2023-03-22 ENCOUNTER — OFFICE VISIT (OUTPATIENT)
Dept: INTERNAL MEDICINE CLINIC | Facility: CLINIC | Age: 73
End: 2023-03-22
Payer: MEDICARE

## 2023-03-22 VITALS
HEART RATE: 66 BPM | OXYGEN SATURATION: 99 % | DIASTOLIC BLOOD PRESSURE: 82 MMHG | SYSTOLIC BLOOD PRESSURE: 138 MMHG | BODY MASS INDEX: 23.28 KG/M2 | WEIGHT: 162.63 LBS | HEIGHT: 70 IN

## 2023-03-22 DIAGNOSIS — L60.2 LONG TOENAIL: ICD-10-CM

## 2023-03-22 DIAGNOSIS — G89.29 CHRONIC RIGHT-SIDED LOW BACK PAIN WITH RIGHT-SIDED SCIATICA: ICD-10-CM

## 2023-03-22 DIAGNOSIS — L21.9 SEBORRHEIC DERMATITIS: ICD-10-CM

## 2023-03-22 DIAGNOSIS — N13.8 BPH WITH OBSTRUCTION/LOWER URINARY TRACT SYMPTOMS: ICD-10-CM

## 2023-03-22 DIAGNOSIS — M87.051 AVASCULAR NECROSIS OF BONE OF RIGHT HIP (HCC): ICD-10-CM

## 2023-03-22 DIAGNOSIS — Z11.59 NEED FOR HEPATITIS C SCREENING TEST: ICD-10-CM

## 2023-03-22 DIAGNOSIS — Z96.641 HISTORY OF TOTAL HIP ARTHROPLASTY, RIGHT: ICD-10-CM

## 2023-03-22 DIAGNOSIS — N40.1 BPH WITH OBSTRUCTION/LOWER URINARY TRACT SYMPTOMS: ICD-10-CM

## 2023-03-22 DIAGNOSIS — I10 ESSENTIAL HYPERTENSION: ICD-10-CM

## 2023-03-22 DIAGNOSIS — M51.36 DEGENERATIVE DISC DISEASE, LUMBAR: ICD-10-CM

## 2023-03-22 DIAGNOSIS — M54.41 CHRONIC RIGHT-SIDED LOW BACK PAIN WITH RIGHT-SIDED SCIATICA: ICD-10-CM

## 2023-03-22 DIAGNOSIS — C44.619 BASAL CELL CARCINOMA OF SKIN OF LEFT UPPER LIMB, INCLUDING SHOULDER: ICD-10-CM

## 2023-03-22 DIAGNOSIS — Z00.00 ENCOUNTER FOR ANNUAL WELLNESS EXAM IN MEDICARE PATIENT: Primary | ICD-10-CM

## 2023-03-22 PROBLEM — M19.011 ARTHRITIS OF SHOULDER REGION, RIGHT: Status: RESOLVED | Noted: 2020-02-04 | Resolved: 2023-03-22

## 2023-03-22 LAB
ALBUMIN SERPL-MCNC: 3.8 G/DL (ref 3.4–5)
ALBUMIN/GLOB SERPL: 1 {RATIO} (ref 1–2)
ALP LIVER SERPL-CCNC: 104 U/L
ALT SERPL-CCNC: 21 U/L
ANION GAP SERPL CALC-SCNC: 5 MMOL/L (ref 0–18)
AST SERPL-CCNC: 16 U/L (ref 15–37)
BASOPHILS # BLD AUTO: 0.07 X10(3) UL (ref 0–0.2)
BASOPHILS NFR BLD AUTO: 1 %
BILIRUB SERPL-MCNC: 0.5 MG/DL (ref 0.1–2)
BUN BLD-MCNC: 20 MG/DL (ref 7–18)
BUN/CREAT SERPL: 19.8 (ref 10–20)
CALCIUM BLD-MCNC: 9.2 MG/DL (ref 8.5–10.1)
CHLORIDE SERPL-SCNC: 103 MMOL/L (ref 98–112)
CO2 SERPL-SCNC: 31 MMOL/L (ref 21–32)
CREAT BLD-MCNC: 1.01 MG/DL
DEPRECATED RDW RBC AUTO: 45.9 FL (ref 35.1–46.3)
EOSINOPHIL # BLD AUTO: 0.56 X10(3) UL (ref 0–0.7)
EOSINOPHIL NFR BLD AUTO: 7.8 %
ERYTHROCYTE [DISTWIDTH] IN BLOOD BY AUTOMATED COUNT: 13.1 % (ref 11–15)
FASTING STATUS PATIENT QL REPORTED: NO
GFR SERPLBLD BASED ON 1.73 SQ M-ARVRAT: 79 ML/MIN/1.73M2 (ref 60–?)
GLOBULIN PLAS-MCNC: 3.7 G/DL (ref 2.8–4.4)
GLUCOSE BLD-MCNC: 81 MG/DL (ref 70–99)
HCT VFR BLD AUTO: 45.1 %
HCV AB SERPL QL IA: NONREACTIVE
HGB BLD-MCNC: 15 G/DL
IMM GRANULOCYTES # BLD AUTO: 0.02 X10(3) UL (ref 0–1)
IMM GRANULOCYTES NFR BLD: 0.3 %
LYMPHOCYTES # BLD AUTO: 1.75 X10(3) UL (ref 1–4)
LYMPHOCYTES NFR BLD AUTO: 24.4 %
MCH RBC QN AUTO: 31.9 PG (ref 26–34)
MCHC RBC AUTO-ENTMCNC: 33.3 G/DL (ref 31–37)
MCV RBC AUTO: 96 FL
MONOCYTES # BLD AUTO: 0.61 X10(3) UL (ref 0.1–1)
MONOCYTES NFR BLD AUTO: 8.5 %
NEUTROPHILS # BLD AUTO: 4.16 X10 (3) UL (ref 1.5–7.7)
NEUTROPHILS # BLD AUTO: 4.16 X10(3) UL (ref 1.5–7.7)
NEUTROPHILS NFR BLD AUTO: 58 %
OSMOLALITY SERPL CALC.SUM OF ELEC: 290 MOSM/KG (ref 275–295)
PLATELET # BLD AUTO: 252 10(3)UL (ref 150–450)
POTASSIUM SERPL-SCNC: 4.1 MMOL/L (ref 3.5–5.1)
PROT SERPL-MCNC: 7.5 G/DL (ref 6.4–8.2)
RBC # BLD AUTO: 4.7 X10(6)UL
SODIUM SERPL-SCNC: 139 MMOL/L (ref 136–145)
WBC # BLD AUTO: 7.2 X10(3) UL (ref 4–11)

## 2023-03-22 PROCEDURE — 1126F AMNT PAIN NOTED NONE PRSNT: CPT | Performed by: FAMILY MEDICINE

## 2023-03-22 PROCEDURE — 86803 HEPATITIS C AB TEST: CPT | Performed by: FAMILY MEDICINE

## 2023-03-22 PROCEDURE — 80053 COMPREHEN METABOLIC PANEL: CPT | Performed by: FAMILY MEDICINE

## 2023-03-22 PROCEDURE — G0439 PPPS, SUBSEQ VISIT: HCPCS | Performed by: FAMILY MEDICINE

## 2023-03-22 PROCEDURE — 85025 COMPLETE CBC W/AUTO DIFF WBC: CPT | Performed by: FAMILY MEDICINE

## 2023-04-13 ENCOUNTER — LAB ENCOUNTER (OUTPATIENT)
Dept: LAB | Facility: HOSPITAL | Age: 73
End: 2023-04-13
Attending: UROLOGY
Payer: MEDICARE

## 2023-04-13 DIAGNOSIS — Z12.5 PROSTATE CANCER SCREENING: ICD-10-CM

## 2023-04-13 DIAGNOSIS — R35.1 NOCTURIA: ICD-10-CM

## 2023-04-13 LAB
BILIRUB UR QL: NEGATIVE
CLARITY UR: CLEAR
COMPLEXED PSA SERPL-MCNC: 1.5 NG/ML (ref ?–4)
GLUCOSE UR-MCNC: NORMAL MG/DL
HGB UR QL STRIP.AUTO: NEGATIVE
KETONES UR-MCNC: NEGATIVE MG/DL
LEUKOCYTE ESTERASE UR QL STRIP.AUTO: NEGATIVE
NITRITE UR QL STRIP.AUTO: NEGATIVE
PH UR: 7.5 [PH] (ref 5–8)
PROT UR-MCNC: NEGATIVE MG/DL
SP GR UR STRIP: 1.01 (ref 1–1.03)
UROBILINOGEN UR STRIP-ACNC: NORMAL

## 2023-04-13 PROCEDURE — 36415 COLL VENOUS BLD VENIPUNCTURE: CPT

## 2023-04-15 DIAGNOSIS — I10 ESSENTIAL HYPERTENSION: ICD-10-CM

## 2023-04-18 ENCOUNTER — OFFICE VISIT (OUTPATIENT)
Dept: SURGERY | Facility: CLINIC | Age: 73
End: 2023-04-18

## 2023-04-18 DIAGNOSIS — Z12.5 PROSTATE CANCER SCREENING: ICD-10-CM

## 2023-04-18 DIAGNOSIS — N40.1 BENIGN PROSTATIC HYPERPLASIA WITH URINARY FREQUENCY: Primary | ICD-10-CM

## 2023-04-18 DIAGNOSIS — R35.0 BENIGN PROSTATIC HYPERPLASIA WITH URINARY FREQUENCY: Primary | ICD-10-CM

## 2023-04-18 DIAGNOSIS — R35.1 NOCTURIA: ICD-10-CM

## 2023-04-18 PROCEDURE — 99213 OFFICE O/P EST LOW 20 MIN: CPT | Performed by: NURSE PRACTITIONER

## 2023-04-18 RX ORDER — FINASTERIDE 5 MG/1
5 TABLET, FILM COATED ORAL DAILY
Qty: 90 TABLET | Refills: 3 | Status: SHIPPED | OUTPATIENT
Start: 2023-04-18 | End: 2024-04-17

## 2023-04-18 RX ORDER — LISINOPRIL 40 MG/1
40 TABLET ORAL DAILY
Qty: 90 TABLET | Refills: 0 | Status: SHIPPED | OUTPATIENT
Start: 2023-04-18

## 2023-04-25 ENCOUNTER — OFFICE VISIT (OUTPATIENT)
Dept: PODIATRY CLINIC | Facility: CLINIC | Age: 73
End: 2023-04-25

## 2023-04-25 DIAGNOSIS — M79.675 TOE PAIN, BILATERAL: ICD-10-CM

## 2023-04-25 DIAGNOSIS — M79.674 TOE PAIN, BILATERAL: ICD-10-CM

## 2023-04-25 DIAGNOSIS — L60.3 ONYCHODYSTROPHY: Primary | ICD-10-CM

## 2023-04-25 PROCEDURE — 99203 OFFICE O/P NEW LOW 30 MIN: CPT | Performed by: PODIATRIST

## 2023-04-25 PROCEDURE — 1126F AMNT PAIN NOTED NONE PRSNT: CPT | Performed by: PODIATRIST

## 2023-04-25 PROCEDURE — 11721 DEBRIDE NAIL 6 OR MORE: CPT | Performed by: PODIATRIST

## 2023-05-01 ENCOUNTER — HOSPITAL ENCOUNTER (OUTPATIENT)
Dept: GENERAL RADIOLOGY | Facility: HOSPITAL | Age: 73
Discharge: HOME | End: 2023-05-01
Attending: ORTHOPAEDIC SURGERY
Payer: MEDICARE

## 2023-05-01 DIAGNOSIS — Z96.641 HISTORY OF TOTAL HIP ARTHROPLASTY, RIGHT: ICD-10-CM

## 2023-05-01 PROCEDURE — 73502 X-RAY EXAM HIP UNI 2-3 VIEWS: CPT | Performed by: ORTHOPAEDIC SURGERY

## 2023-05-17 ENCOUNTER — OFFICE VISIT (OUTPATIENT)
Dept: AUDIOLOGY | Facility: CLINIC | Age: 73
End: 2023-05-17

## 2023-05-17 DIAGNOSIS — H90.3 SENSORINEURAL HEARING LOSS, BILATERAL: Primary | ICD-10-CM

## 2023-05-17 PROCEDURE — 92593 HEARING AID CHECK, BOTH EARS: CPT | Performed by: AUDIOLOGIST

## 2023-05-20 RX ORDER — AMLODIPINE BESYLATE 2.5 MG/1
TABLET ORAL
Qty: 90 TABLET | Refills: 0 | Status: SHIPPED | OUTPATIENT
Start: 2023-05-20

## 2023-06-29 ENCOUNTER — APPOINTMENT (OUTPATIENT)
Dept: URBAN - METROPOLITAN AREA CLINIC 244 | Age: 73
Setting detail: DERMATOLOGY
End: 2023-07-06

## 2023-06-29 DIAGNOSIS — D22 MELANOCYTIC NEVI: ICD-10-CM

## 2023-06-29 DIAGNOSIS — Z85.828 PERSONAL HISTORY OF OTHER MALIGNANT NEOPLASM OF SKIN: ICD-10-CM

## 2023-06-29 DIAGNOSIS — L57.0 ACTINIC KERATOSIS: ICD-10-CM

## 2023-06-29 DIAGNOSIS — L82.1 OTHER SEBORRHEIC KERATOSIS: ICD-10-CM

## 2023-06-29 DIAGNOSIS — L81.4 OTHER MELANIN HYPERPIGMENTATION: ICD-10-CM

## 2023-06-29 PROBLEM — D22.5 MELANOCYTIC NEVI OF TRUNK: Status: ACTIVE | Noted: 2023-06-29

## 2023-06-29 PROCEDURE — 99213 OFFICE O/P EST LOW 20 MIN: CPT | Mod: 25

## 2023-06-29 PROCEDURE — OTHER MIPS QUALITY: OTHER

## 2023-06-29 PROCEDURE — 17004 DESTROY PREMAL LESIONS 15/>: CPT

## 2023-06-29 PROCEDURE — OTHER LIQUID NITROGEN: OTHER

## 2023-06-29 PROCEDURE — OTHER COUNSELING: OTHER

## 2023-06-29 ASSESSMENT — LOCATION DETAILED DESCRIPTION DERM
LOCATION DETAILED: PERIUMBILICAL SKIN
LOCATION DETAILED: LEFT LATERAL FOREHEAD
LOCATION DETAILED: LEFT POSTERIOR SHOULDER
LOCATION DETAILED: LEFT SUPERIOR FOREHEAD
LOCATION DETAILED: RIGHT SUPERIOR MEDIAL FOREHEAD
LOCATION DETAILED: INFERIOR MID FOREHEAD
LOCATION DETAILED: RIGHT INFERIOR FOREHEAD
LOCATION DETAILED: MEDIAL FRONTAL SCALP
LOCATION DETAILED: NASAL DORSUM

## 2023-06-29 ASSESSMENT — LOCATION ZONE DERM
LOCATION ZONE: FACE
LOCATION ZONE: ARM
LOCATION ZONE: NOSE
LOCATION ZONE: SCALP
LOCATION ZONE: TRUNK

## 2023-06-29 ASSESSMENT — LOCATION SIMPLE DESCRIPTION DERM
LOCATION SIMPLE: LEFT FOREHEAD
LOCATION SIMPLE: ABDOMEN
LOCATION SIMPLE: LEFT SHOULDER
LOCATION SIMPLE: RIGHT FOREHEAD
LOCATION SIMPLE: FRONTAL SCALP
LOCATION SIMPLE: NOSE
LOCATION SIMPLE: INFERIOR FOREHEAD

## 2023-06-29 NOTE — PROCEDURE: LIQUID NITROGEN
Post-Care Instructions: I reviewed with the patient in detail post-care instructions. Patient is to wear sunprotection, and avoid picking at any of the treated lesions. Pt may apply Vaseline to crusted or scabbing areas.
Consent: The patient's consent was obtained including but not limited to risks of crusting, scabbing, blistering, scarring, darker or lighter pigmentary change, recurrence, incomplete removal and infection.
Detail Level: Zone
Render Post-Care Instructions In Note?: no
Total Number Of Aks Treated: 22
Duration Of Freeze Thaw-Cycle (Seconds): 0

## 2023-07-03 ENCOUNTER — OFFICE VISIT (OUTPATIENT)
Dept: OTOLARYNGOLOGY | Facility: CLINIC | Age: 73
End: 2023-07-03

## 2023-07-03 DIAGNOSIS — H61.23 BILATERAL IMPACTED CERUMEN: Primary | ICD-10-CM

## 2023-07-03 PROCEDURE — 69210 REMOVE IMPACTED EAR WAX UNI: CPT | Performed by: SPECIALIST

## 2023-07-03 RX ORDER — CELECOXIB 200 MG/1
CAPSULE ORAL
COMMUNITY
Start: 2023-06-11

## 2023-07-06 ENCOUNTER — OFFICE VISIT (OUTPATIENT)
Dept: AUDIOLOGY | Facility: CLINIC | Age: 73
End: 2023-07-06

## 2023-07-06 ENCOUNTER — OFFICE VISIT (OUTPATIENT)
Dept: OTOLARYNGOLOGY | Facility: CLINIC | Age: 73
End: 2023-07-06

## 2023-07-06 VITALS — HEIGHT: 70 IN | BODY MASS INDEX: 22.9 KG/M2 | WEIGHT: 160 LBS

## 2023-07-06 DIAGNOSIS — H90.3 SENSORINEURAL HEARING LOSS, BILATERAL: Primary | ICD-10-CM

## 2023-07-06 DIAGNOSIS — H91.93 BILATERAL HEARING LOSS, UNSPECIFIED HEARING LOSS TYPE: Primary | ICD-10-CM

## 2023-07-06 DIAGNOSIS — H90.3 ASYMMETRIC SNHL (SENSORINEURAL HEARING LOSS): ICD-10-CM

## 2023-07-06 PROCEDURE — 92557 COMPREHENSIVE HEARING TEST: CPT | Performed by: AUDIOLOGIST

## 2023-07-06 PROCEDURE — 92567 TYMPANOMETRY: CPT | Performed by: AUDIOLOGIST

## 2023-07-06 PROCEDURE — 99213 OFFICE O/P EST LOW 20 MIN: CPT | Performed by: SPECIALIST

## 2023-07-06 NOTE — PATIENT INSTRUCTIONS
You had a 20 dB decrease in hearing at 2000 Hz only on the left ear. We discussed considering a steroid trial.  This audiogram was compared to the one in March 2022. We decided to not do the steroid trial.  You can get your hearing aids adjusted. Repeat the audiogram in 1 years time, sooner if problems.

## 2023-07-15 DIAGNOSIS — I10 ESSENTIAL HYPERTENSION: ICD-10-CM

## 2023-07-17 RX ORDER — LISINOPRIL 40 MG/1
40 TABLET ORAL DAILY
Qty: 90 TABLET | Refills: 0 | Status: SHIPPED | OUTPATIENT
Start: 2023-07-17

## 2023-07-18 ENCOUNTER — OFFICE VISIT (OUTPATIENT)
Dept: AUDIOLOGY | Facility: CLINIC | Age: 73
End: 2023-07-18

## 2023-07-18 DIAGNOSIS — H90.3 SENSORINEURAL HEARING LOSS, BILATERAL: Primary | ICD-10-CM

## 2023-07-18 PROCEDURE — 92593 HEARING AID CHECK, BOTH EARS: CPT | Performed by: AUDIOLOGIST

## 2023-07-18 NOTE — PROGRESS NOTES
HEARING AID FOLLOW-UP    Heather Miranda  1/9/1950  [id number]    Patient is here for reprogramming of hearing aid. Hearing aid information:   Virl Peed V70P  Right #[id number]  Coupled to custom earmold with standard tubing  Left # [id number] coupled to slim tube and large open dome  Date dispensed:  10-4-16  Battery:  13       The patient has the following concerns:   Hearing testing on 7-6-23 showed a decrease of approximately 20dB in left ear at [facility] compared to previous audiograms. Previous hearing aid check on 5-17-23 was completed but no programming changes made at that time. In office the following actions were taken:  Reprogrammed hearing aids to new audiogram.   Changed left dome from open to vented/closed. Completed REM with good match of NAL-N2 targets. This visit is considered extension of previous annual hearing aid check (5-17-23) and therefore no charges entered today. Patient is to follow up in one year or sooner as needed.      7/18/2023  Gay Escalante

## 2023-08-07 ENCOUNTER — OFFICE VISIT (OUTPATIENT)
Dept: PODIATRY CLINIC | Facility: CLINIC | Age: 73
End: 2023-08-07

## 2023-08-07 DIAGNOSIS — M79.675 TOE PAIN, BILATERAL: ICD-10-CM

## 2023-08-07 DIAGNOSIS — M79.674 TOE PAIN, BILATERAL: ICD-10-CM

## 2023-08-07 DIAGNOSIS — L60.3 ONYCHODYSTROPHY: Primary | ICD-10-CM

## 2023-08-07 PROCEDURE — 1126F AMNT PAIN NOTED NONE PRSNT: CPT | Performed by: PODIATRIST

## 2023-08-07 PROCEDURE — 99213 OFFICE O/P EST LOW 20 MIN: CPT | Performed by: PODIATRIST

## 2023-08-07 NOTE — PROGRESS NOTES
[facility] (formerly [facility]3], [facility] Podiatry  Progress Note    Edith Mcintyre is a 68year old male. Patient presents with:  Toenail Care: Nail trim and foot check f/u- denies any pain at this time        HPI:     This is a pleasant male with PMH of lumbar DDD. He presents to clinic today for nail care. He complains of long thick painful toenails which he is unable to trim himself due to having hip replacement. Allergies: Merthiolate Glycerite [Thimerosal] and Shellfish-Derived Products   Current Outpatient Medications   Medication Sig Dispense Refill    lisinopril 40 MG Oral Tab Take 1 tablet (40 mg total) by mouth daily. 90 tablet 0    celecoxib 200 MG Oral Cap       AMLODIPINE 2.5 MG Oral Tab TAKE ONE TABLET BY MOUTH ONE TIME DAILY 90 tablet 0    amoxicillin 500 MG Oral Cap Take two capsules one hour prior to dental work; take one capsule six hours later. (Patient not taking: Reported on 6/12/2023) 6 capsule 2    finasteride 5 MG Oral Tab Take 1 tablet (5 mg total) by mouth daily. 90 tablet 3    Cholecalciferol (D3 SUPER STRENGTH) 50 MCG (2000 UT) Oral Cap       triamcinolone 0.1 % External Ointment Apply topically. As needed      Cinnamon 500 MG Oral Cap       Magnesium Gluconate (MAGNESIUM 27) 500 (27 Mg) MG Oral Tab Take twice daily       Nutritional Supplements (JUICE PLUS FIBRE OR)       Vitamins-Lipotropics (BALANCED B-100 COMPLEX CR OR)       Omega-3 Fatty Acids (FISH OIL) 500 MG Oral Cap Take by mouth daily.           Past Medical History:   Diagnosis Date    Back problem     Stenosis    Benign prostatic hypertrophy     Degenerative joint disease (DJD) of lumbar spine     Hearing impairment     Hearing aides    High blood pressure     Neuropathy of both feet     Osteoarthritis     Skin cancer of nose 11/2016    Visual impairment     Glasses      Past Surgical History:   Procedure Laterality Date    APPENDECTOMY      BIOPSY OF SKIN LESION  11/2016    BIOPSY OF SKIN LESION  1995    mid back skin cyst COLONOSCOPY      COLONOSCOPY N/A 03/15/2022    Procedure: COLONOSCOPY;  Surgeon: Leonardo Naidu MD;  Location: [address] [place] [address] ENDOSCOPY    COLONOSCOPY & POLYPECTOMY  10/28/2016    CONTACT LASER SURGERY OF PROSTATE  2018    cysto, urethral dilt    HIP SURGERY Right 2023    right total hip arthroplasty    TONSILLECTOMY  1960    TOTAL HIP REPLACEMENT Right 2023    Right total hip arthroplasty      Family History   Problem Relation Age of Onset    Hypertension Father     Hypertension Mother     Cancer Mother         breast    Hypertension Brother     Other (Pre-Diabetic) Brother       Social History    Socioeconomic History      Marital status:       Number of children: 2    Occupational History      Occupation: [employer]        Comment: retired      Occupation:         Employer: [employer]: retired    Tobacco Use      Smoking status: Former        Packs/day: 1.00        Years: 10.00        Pack years: 10        Types: Cigarettes        Quit date: 3/27/1980        Years since quittin.3      Smokeless tobacco: Never    Vaping Use      Vaping Use: Never used    Substance and Sexual Activity      Alcohol use: Yes        Comment: Per NG: Drinks beer and wine, 4 -5 drinks weekly. Drug use: Never    Other Topics      Concerns:        Caffeine Concern: Yes          Per NG: Coffee, 3 cups daily          REVIEW OF SYSTEMS:   Denies nausea, fever, chills  No calf pain  No other muscle or joint aches  Denies chest pain or shortness of breath. EXAM:   There were no vitals taken for this visit. Constitutional:   Patient in no apparent distress. Well kept. Of normal body habitus. Alert and oriented to person, place, and time.   Vascular Examination:  DP pulse is 2/4  PT pulse is 2/4  Capillary refill is immediate  Integumentary Examination:   The patient's nails appear incurvated, thickened, elongated, dystrophic,  1-5 right, 1-5  left nails.  Digital hair growth is present left and is present right. Skin is of good turgor  Neurological Examination:  Sharp/dull is present to right and is present to left. Parasthesias absent. Musculoskeletal Examination:  Muscle Strength is 5/5. Pain on palpation to nails. LABS & IMAGING:     Lab Results   Component Value Date    GLU 81 03/22/2023    BUN 20 (H) 03/22/2023    CREATSERUM 1.01 03/22/2023    BUNCREA 19.8 03/22/2023    ANIONGAP 5 03/22/2023    GFRAA 103 10/19/2021    GFRNAA 89 10/19/2021    CA 9.2 03/22/2023     03/22/2023    K 4.1 03/22/2023     03/22/2023    CO2 31.0 03/22/2023    OSMOCALC 290 03/22/2023        No results found for: EAG, A1C     No results found. ASSESSMENT AND PLAN:   Diagnoses and all orders for this visit:    Onychodystrophy    Toe pain, bilateral          Plan:     Reviewed treatment options for nail dystrophy including:   No treatment and monitoring, topical meds, oral meds, nail removal  Topical meds are much safer yet carry very low efficacy. Pt has opted for debridement of nails and monitoring. Evaluated the patient. Discussed treatment options with the patient. Discussed with patient proper care and hygiene for their feet. Patient tolerated procedures well, without incident. Instrumentation used includes nail nippers and electric  where appropriate. Procedure: (43585 Debridement of toenails 6-10) Surgically debrided and mechanically reduced 6 or more toenails. Hemmorhage occurred none. Nails that were debrided appeared dystrophic and caused the patient pain in shoe gear. Nails 5 Left & 5 Right. RTC 3 months for nail care    No follow-ups on file.     Payal Jiménez DPM  8/7/23

## 2023-08-18 RX ORDER — AMLODIPINE BESYLATE 2.5 MG/1
2.5 TABLET ORAL DAILY
Qty: 90 TABLET | Refills: 0 | Status: SHIPPED | OUTPATIENT
Start: 2023-08-18

## 2023-10-02 ENCOUNTER — HOSPITAL ENCOUNTER (OUTPATIENT)
Dept: GENERAL RADIOLOGY | Facility: HOSPITAL | Age: 73
Discharge: HOME | End: 2023-10-02
Attending: ORTHOPAEDIC SURGERY
Payer: MEDICARE

## 2023-10-02 DIAGNOSIS — Z96.641 HISTORY OF TOTAL HIP ARTHROPLASTY, RIGHT: ICD-10-CM

## 2023-10-02 PROCEDURE — 73502 X-RAY EXAM HIP UNI 2-3 VIEWS: CPT | Performed by: ORTHOPAEDIC SURGERY

## 2023-10-09 ENCOUNTER — PATIENT MESSAGE (OUTPATIENT)
Dept: INTERNAL MEDICINE CLINIC | Facility: CLINIC | Age: 73
End: 2023-10-09

## 2023-10-09 DIAGNOSIS — I10 ESSENTIAL HYPERTENSION: ICD-10-CM

## 2023-10-09 NOTE — TELEPHONE ENCOUNTER
From: Heather Miranda  To: Rose Mary Moss  Sent: 10/9/2023 12:08 PM CDT  Subject: Lisinopril refill    Hi Dr. Lynnette Santillan,  Could you please authorize a refill of my Lisinopril prescription? Thank you!   Guanako Salamanca

## 2023-10-10 RX ORDER — LISINOPRIL 40 MG/1
40 TABLET ORAL DAILY
Qty: 90 TABLET | Refills: 3 | Status: SHIPPED | OUTPATIENT
Start: 2023-10-10

## 2023-11-10 ENCOUNTER — OFFICE VISIT (OUTPATIENT)
Dept: PODIATRY CLINIC | Facility: CLINIC | Age: 73
End: 2023-11-10
Payer: MEDICARE

## 2023-11-10 DIAGNOSIS — M79.675 TOE PAIN, BILATERAL: ICD-10-CM

## 2023-11-10 DIAGNOSIS — L60.3 ONYCHODYSTROPHY: Primary | ICD-10-CM

## 2023-11-10 DIAGNOSIS — M79.674 TOE PAIN, BILATERAL: ICD-10-CM

## 2023-11-10 PROCEDURE — 99213 OFFICE O/P EST LOW 20 MIN: CPT | Performed by: PODIATRIST

## 2023-11-10 NOTE — PROGRESS NOTES
[address] [facility] Podiatry  Progress Note    Marleny Swanson is a 68year old male. Chief Complaint   Patient presents with    Toenail Care     Pt here for toenail trim and foot check - Pt denies any pain or concerns. HPI:     This is a pleasant male with PMH of lumbar DDD. He presents to clinic today for nail care. He complains of long thick painful toenails which he is unable to trim himself due to having hip replacement. Allergies: Merthiolate glycerite [thimerosal] and Shellfish-derived products   Current Outpatient Medications   Medication Sig Dispense Refill    lisinopril 40 MG Oral Tab Take 1 tablet (40 mg total) by mouth daily. 90 tablet 3    amLODIPine 2.5 MG Oral Tab Take 1 tablet (2.5 mg total) by mouth daily. 90 tablet 0    celecoxib 200 MG Oral Cap       finasteride 5 MG Oral Tab Take 1 tablet (5 mg total) by mouth daily. 90 tablet 3    Cholecalciferol (D3 SUPER STRENGTH) 50 MCG (2000 UT) Oral Cap       triamcinolone 0.1 % External Ointment Apply topically. As needed      Cinnamon 500 MG Oral Cap       Magnesium Gluconate (MAGNESIUM 27) 500 (27 Mg) MG Oral Tab Take twice daily       Vitamins-Lipotropics (BALANCED B-100 COMPLEX CR OR)       Omega-3 Fatty Acids (FISH OIL) 500 MG Oral Cap Take by mouth daily. amoxicillin 500 MG Oral Cap Take two capsules one hour prior to dental work; take one capsule six hours later.  (Patient not taking: Reported on 11/10/2023) 6 capsule 2    Nutritional Supplements (JUICE PLUS FIBRE OR)         Past Medical History:   Diagnosis Date    Back problem     Stenosis    Benign prostatic hypertrophy     Degenerative joint disease (DJD) of lumbar spine     Hearing impairment     Hearing aides    High blood pressure     Neuropathy of both feet     Osteoarthritis     Skin cancer of nose 11/2016    Visual impairment     Glasses      Past Surgical History:   Procedure Laterality Date    APPENDECTOMY      BIOPSY OF SKIN LESION  11/2016    BIOPSY OF SKIN LESION      mid back skin cyst    COLONOSCOPY      COLONOSCOPY N/A 03/15/2022    Procedure: COLONOSCOPY;  Surgeon: Bisi Walters MD;  Location: [address] [place] [address] ENDOSCOPY    COLONOSCOPY & POLYPECTOMY  10/28/2016    CONTACT LASER SURGERY OF PROSTATE  2018    cysto, urethral dilt    HIP SURGERY Right 2023    right total hip arthroplasty    TONSILLECTOMY  1960    TOTAL HIP REPLACEMENT Right 2023    Right total hip arthroplasty      Family History   Problem Relation Age of Onset    Hypertension Father     Hypertension Mother     Cancer Mother         breast    Hypertension Brother     Other (Pre-Diabetic) Brother       Social History     Socioeconomic History    Marital status:     Number of children: 2   Occupational History    Occupation: [employer]     Comment: retired    Occupation:      Employer: [address] [place] [address] [place]: retired   Tobacco Use    Smoking status: Former     Packs/day: 1.00     Years: 10.00     Additional pack years: 0.00     Total pack years: 10.00     Types: Cigarettes     Quit date: 3/27/1980     Years since quittin.6    Smokeless tobacco: Never   Vaping Use    Vaping Use: Never used   Substance and Sexual Activity    Alcohol use: Yes     Comment: Per NG: Drinks beer and wine, 4 -5 drinks weekly. Drug use: Never   Other Topics Concern    Caffeine Concern Yes     Comment: Per NG: Coffee, 3 cups daily           REVIEW OF SYSTEMS:   Denies nausea, fever, chills  No calf pain  No other muscle or joint aches  Denies chest pain or shortness of breath. EXAM:   There were no vitals taken for this visit. Constitutional:   Patient in no apparent distress. Well kept. Of normal body habitus. Alert and oriented to person, place, and time.   Vascular Examination:  DP pulse is 2/4  PT pulse is 2/4  Capillary refill is immediate  Integumentary Examination:   The patient's nails appear incurvated, thickened, elongated, dystrophic,  1-5 right, 1-5  left nails. Digital hair growth is present left and is present right. Skin is of good turgor  Neurological Examination:  Sharp/dull is present to right and is present to left. Parasthesias absent. Musculoskeletal Examination:  Muscle Strength is 5/5. Pain on palpation to nails. LABS & IMAGING:     Lab Results   Component Value Date    GLU 81 03/22/2023    BUN 20 (H) 03/22/2023    CREATSERUM 1.01 03/22/2023    BUNCREA 19.8 03/22/2023    ANIONGAP 5 03/22/2023    GFRAA 103 10/19/2021    GFRNAA 89 10/19/2021    CA 9.2 03/22/2023     03/22/2023    K 4.1 03/22/2023     03/22/2023    CO2 31.0 03/22/2023    OSMOCALC 290 03/22/2023        No results found for: \"EAG\", \"A1C\"     No results found. ASSESSMENT AND PLAN:   Diagnoses and all orders for this visit:    Onychodystrophy    Toe pain, bilateral            Plan:     Reviewed treatment options for nail dystrophy including:   No treatment and monitoring, topical meds, oral meds, nail removal  Topical meds are much safer yet carry very low efficacy. Pt has opted for debridement of nails and monitoring. Evaluated the patient. Discussed treatment options with the patient. Discussed with patient proper care and hygiene for their feet. Patient tolerated procedures well, without incident. Instrumentation used includes nail nippers and electric  where appropriate. Procedure: (05484 Debridement of toenails 6-10) Surgically debrided and mechanically reduced 6 or more toenails. Hemmorhage occurred none. Nails that were debrided appeared dystrophic and caused the patient pain in shoe gear. Nails 5 Left & 5 Right. RTC 3 months for nail care    No follow-ups on file.     Esvin Orozco DPM  11/10/23

## 2023-11-13 RX ORDER — AMLODIPINE BESYLATE 2.5 MG/1
2.5 TABLET ORAL DAILY
Qty: 90 TABLET | Refills: 3 | Status: SHIPPED | OUTPATIENT
Start: 2023-11-13

## 2023-11-13 NOTE — TELEPHONE ENCOUNTER
Future Appointment:none      Last Appointment:3/22/23      Last Refill:8/18/23      Medication Requested:   Requested Prescriptions     Pending Prescriptions Disp Refills    AMLODIPINE 2.5 MG Oral Tab [Pharmacy Med Name: Amlodipine Besylate 2.5 Mg Tab Asce] 90 tablet 0     Sig: TAKE ONE TABLET BY MOUTH ONE TIME DAILY     Protocol :     Hypertension Medications Protocol Wuriye0311/13/2023 01:31 AM   Protocol Details Appointment in past 6 or next 3 months    CMP or BMP in past 12 months    Last serum creatinine< 2.0

## 2023-12-05 PROBLEM — Z96.641 HISTORY OF TOTAL RIGHT HIP ARTHROPLASTY: Status: ACTIVE | Noted: 2023-02-06

## 2024-01-22 PROBLEM — Z96.641 H/O TOTAL HIP ARTHROPLASTY, RIGHT: Status: ACTIVE | Noted: 2023-02-06

## 2024-02-12 ENCOUNTER — OFFICE VISIT (OUTPATIENT)
Dept: PODIATRY CLINIC | Facility: CLINIC | Age: 74
End: 2024-02-12
Payer: MEDICARE

## 2024-02-12 DIAGNOSIS — L60.3 ONYCHODYSTROPHY: Primary | ICD-10-CM

## 2024-02-12 DIAGNOSIS — M79.675 TOE PAIN, BILATERAL: ICD-10-CM

## 2024-02-12 DIAGNOSIS — M79.674 TOE PAIN, BILATERAL: ICD-10-CM

## 2024-02-12 PROCEDURE — 99213 OFFICE O/P EST LOW 20 MIN: CPT | Performed by: PODIATRIST

## 2024-02-12 NOTE — PROGRESS NOTES
[facility] Podiatry  Progress Note    Flash New is a 74 year old male.   Chief Complaint   Patient presents with    Toenail Care     Nail trim and foot check f/u- denies pain           HPI:     This is a pleasant male with PMH of lumbar DDD.      He presents to clinic today for nail care.  He complains of long thick painful toenails which he is unable to trim himself due to having hip replacement.          Allergies: Merthiolate glycerite [thimerosal] and Shellfish-derived products   Current Outpatient Medications   Medication Sig Dispense Refill    amLODIPine 2.5 MG Oral Tab Take 1 tablet (2.5 mg total) by mouth daily. 90 tablet 3    lisinopril 40 MG Oral Tab Take 1 tablet (40 mg total) by mouth daily. 90 tablet 3    celecoxib 200 MG Oral Cap  (Patient not taking: Reported on 1/22/2024)      amoxicillin 500 MG Oral Cap Take two capsules one hour prior to dental work; take one capsule six hours later. 6 capsule 2    finasteride 5 MG Oral Tab Take 1 tablet (5 mg total) by mouth daily. 90 tablet 3    Cholecalciferol (D3 SUPER STRENGTH) 50 MCG (2000 UT) Oral Cap       triamcinolone 0.1 % External Ointment Apply topically. As needed      Cinnamon 500 MG Oral Cap       Magnesium Gluconate (MAGNESIUM 27) 500 (27 Mg) MG Oral Tab Take twice daily       Nutritional Supplements (JUICE PLUS FIBRE OR)       Vitamins-Lipotropics (BALANCED B-100 COMPLEX CR OR)       Omega-3 Fatty Acids (FISH OIL) 500 MG Oral Cap Take by mouth daily.          Past Medical History:   Diagnosis Date    Back problem     Stenosis    Benign prostatic hypertrophy     Degenerative joint disease (DJD) of lumbar spine     Hearing impairment     Hearing aides    High blood pressure     Neuropathy of both feet     Osteoarthritis     Skin cancer of nose 11/2016    Visual impairment     Glasses      Past Surgical History:   Procedure Laterality Date    APPENDECTOMY      BIOPSY OF SKIN LESION  11/2016    BIOPSY OF SKIN LESION  1995    mid back  skin cyst    COLONOSCOPY  2003    COLONOSCOPY N/A 03/15/2022    Procedure: COLONOSCOPY;  Surgeon: Irwin Doyle MD;  Location: [facility] ENDOSCOPY    COLONOSCOPY & POLYPECTOMY  10/28/2016    CONTACT LASER SURGERY OF PROSTATE  2018    cysto, urethral dilt    HIP SURGERY Right 2023    right total hip arthroplasty    TONSILLECTOMY  1960    TOTAL HIP REPLACEMENT Right 2023    Right total hip arthroplasty      Family History   Problem Relation Age of Onset    Hypertension Father     Hypertension Mother     Cancer Mother         breast    Hypertension Brother     Other (Pre-Diabetic) Brother       Social History     Socioeconomic History    Marital status:     Number of children: 2   Occupational History    Occupation: [employer]     Comment: retired    Occupation:      Employer: [employer]     Comment: retired   Tobacco Use    Smoking status: Former     Packs/day: 1.00     Years: 10.00     Additional pack years: 0.00     Total pack years: 10.00     Types: Cigarettes     Quit date: 3/27/1980     Years since quittin.9    Smokeless tobacco: Never   Vaping Use    Vaping Use: Never used   Substance and Sexual Activity    Alcohol use: Yes     Comment: Per NG: Drinks beer and wine, 4 -5 drinks weekly.    Drug use: Never   Other Topics Concern    Caffeine Concern Yes     Comment: Per NG: Coffee, 3 cups daily           REVIEW OF SYSTEMS:   Denies nausea, fever, chills  No calf pain  No other muscle or joint aches  Denies chest pain or shortness of breath.      EXAM:   There were no vitals taken for this visit.    Constitutional:   Patient in no apparent distress. Well kept. Of normal body habitus. Alert and oriented to person, place, and time.  Vascular Examination:  DP pulse is 2/4  PT pulse is 2/4  Capillary refill is immediate  Integumentary Examination:   The patient's nails appear incurvated, thickened, elongated, dystrophic,  1-5 right, 1-5  left nails.  Digital  hair growth is present left and is present right.  Skin is of good turgor  Neurological Examination:  Sharp/dull is present to right and is present to left.  Parasthesias absent.  Musculoskeletal Examination:  Muscle Strength is 5/5.    Pain on palpation to nails.          LABS & IMAGING:     Lab Results   Component Value Date    GLU 81 03/22/2023    BUN 20 (H) 03/22/2023    CREATSERUM 1.01 03/22/2023    BUNCREA 19.8 03/22/2023    ANIONGAP 5 03/22/2023    GFRAA 103 10/19/2021    GFRNAA 89 10/19/2021    CA 9.2 03/22/2023     03/22/2023    K 4.1 03/22/2023     03/22/2023    CO2 31.0 03/22/2023    OSMOCALC 290 03/22/2023        No results found for: \"EAG\", \"A1C\"     No results found.     ASSESSMENT AND PLAN:   Diagnoses and all orders for this visit:    Onychodystrophy    Toe pain, bilateral              Plan:     Reviewed treatment options for nail dystrophy including:   No treatment and monitoring, topical meds, oral meds, nail removal  Topical meds are much safer yet carry very low efficacy.  Pt has opted for debridement of nails and monitoring.    Evaluated the patient. Discussed treatment options with the patient.  Discussed with patient proper care and hygiene for their feet.  Patient tolerated procedures well, without incident.    Instrumentation used includes nail nippers and electric  where appropriate.  Procedure: (48658 Debridement of toenails 6-10) Surgically debrided and mechanically reduced 6 or more toenails.Hemmorhage occurred none.Nails that were debrided appeared dystrophic and caused the patient pain in shoe gear.Nails 5 Left & 5 Right.      RTC 3 months for nail care    No follow-ups on file.    Silviano Vieira DPM  2/12/24

## 2024-03-19 ENCOUNTER — TELEPHONE (OUTPATIENT)
Dept: SURGERY | Facility: CLINIC | Age: 74
End: 2024-03-19

## 2024-03-19 RX ORDER — FINASTERIDE 5 MG/1
5 TABLET, FILM COATED ORAL DAILY
Qty: 90 TABLET | Refills: 3 | Status: SHIPPED | OUTPATIENT
Start: 2024-03-19 | End: 2025-03-19

## 2024-03-19 NOTE — TELEPHONE ENCOUNTER
Refill per protocol:    Benign Prostatic Hypertrophy Medications      Protocol Criteria:  Appointment scheduled in the past 12 months or in the next 2 months  If patients is between the ages of 50 and 70 then PSA done within the last 2 years and is either  Less than or equal to 4.0 ng/ml  Or if greater than 4.0 there is no significant increase (>0.5 ng/ml) in PSA over the last 2 years    Recent Outpatient Visits              1 month ago Onychodystrophy    [facility] [place]    Office Visit    1 month ago H/O total hip arthroplasty, right    [facility] Shayne Cedeno MD    Office Visit    3 months ago History of total right hip arthroplasty    [facility] Shayne Cedeno MD    Office Visit    4 months ago Onychodystrophy    [facility], Sil[place]iano Marinelli, DPM    Office Visit    5 months ago H/O total hip arthroplasty, right    [facility] Shayne Cedeno MD    Office Visit          Future Appointments         Provider Department Appt Notes    In 2 weeks Wendie Wolf MD [facility] [place]     In 1 month [facility]    In 1 month Shayne Cedeno MD [facility] ORTHOP[facility]ED[facility] RIGHT HIPF/UP    In 1 month Silviano Vieira, DPM [facility] [place] 3 mo f/u                PSA:    Lab Results   Component Value Date    PSA 2.7 01/16/2019    PSA 3.1 12/20/2017    PSA 2.9 05/15/2017    PSA 2.6 12/08/2016       PSA Screen:    Lab Results   Component Value Date    PSAS 1.50 04/13/2023    PSAS 2.91 04/12/2022    PSAS 2.96 01/21/2020    PSAS 2.83 01/16/2019

## 2024-03-19 NOTE — TELEPHONE ENCOUNTER
Pt stopped at the  asking for a refill of Finestride ,   Thought  the Pharmacy was reaching out,.  Pt is almost out.  Has a appt with Dr Silverio   4/24/24.

## 2024-03-29 ENCOUNTER — LAB ENCOUNTER (OUTPATIENT)
Dept: LAB | Facility: HOSPITAL | Age: 74
End: 2024-03-29
Attending: FAMILY MEDICINE
Payer: MEDICARE

## 2024-03-29 DIAGNOSIS — Z00.00 PHYSICAL EXAM: ICD-10-CM

## 2024-03-29 LAB
ALBUMIN SERPL-MCNC: 4.2 G/DL (ref 3.2–4.8)
ALBUMIN/GLOB SERPL: 1.5 {RATIO} (ref 1–2)
ALP LIVER SERPL-CCNC: 86 U/L
ALT SERPL-CCNC: 11 U/L
ANION GAP SERPL CALC-SCNC: 5 MMOL/L (ref 0–18)
AST SERPL-CCNC: 18 U/L (ref ?–34)
BASOPHILS # BLD AUTO: 0.06 X10(3) UL (ref 0–0.2)
BASOPHILS NFR BLD AUTO: 0.8 %
BILIRUB SERPL-MCNC: 0.8 MG/DL (ref 0.2–1.1)
BUN BLD-MCNC: 18 MG/DL (ref 9–23)
BUN/CREAT SERPL: 17.8 (ref 10–20)
CALCIUM BLD-MCNC: 9.2 MG/DL (ref 8.7–10.4)
CHLORIDE SERPL-SCNC: 107 MMOL/L (ref 98–112)
CHOLEST SERPL-MCNC: 170 MG/DL (ref ?–200)
CO2 SERPL-SCNC: 30 MMOL/L (ref 21–32)
CREAT BLD-MCNC: 1.01 MG/DL
DEPRECATED RDW RBC AUTO: 43.3 FL (ref 35.1–46.3)
EGFRCR SERPLBLD CKD-EPI 2021: 78 ML/MIN/1.73M2 (ref 60–?)
EOSINOPHIL # BLD AUTO: 0.35 X10(3) UL (ref 0–0.7)
EOSINOPHIL NFR BLD AUTO: 4.8 %
ERYTHROCYTE [DISTWIDTH] IN BLOOD BY AUTOMATED COUNT: 13.1 % (ref 11–15)
FASTING PATIENT LIPID ANSWER: YES
FASTING STATUS PATIENT QL REPORTED: YES
GLOBULIN PLAS-MCNC: 2.8 G/DL (ref 2.8–4.4)
GLUCOSE BLD-MCNC: 85 MG/DL (ref 70–99)
HCT VFR BLD AUTO: 47.2 %
HDLC SERPL-MCNC: 55 MG/DL (ref 40–59)
HGB BLD-MCNC: 16.6 G/DL
IMM GRANULOCYTES # BLD AUTO: 0.02 X10(3) UL (ref 0–1)
IMM GRANULOCYTES NFR BLD: 0.3 %
LDLC SERPL CALC-MCNC: 96 MG/DL (ref ?–100)
LYMPHOCYTES # BLD AUTO: 2.63 X10(3) UL (ref 1–4)
LYMPHOCYTES NFR BLD AUTO: 36.4 %
MCH RBC QN AUTO: 31.9 PG (ref 26–34)
MCHC RBC AUTO-ENTMCNC: 35.2 G/DL (ref 31–37)
MCV RBC AUTO: 90.8 FL
MONOCYTES # BLD AUTO: 0.57 X10(3) UL (ref 0.1–1)
MONOCYTES NFR BLD AUTO: 7.9 %
NEUTROPHILS # BLD AUTO: 3.59 X10 (3) UL (ref 1.5–7.7)
NEUTROPHILS # BLD AUTO: 3.59 X10(3) UL (ref 1.5–7.7)
NEUTROPHILS NFR BLD AUTO: 49.8 %
NONHDLC SERPL-MCNC: 115 MG/DL (ref ?–130)
OSMOLALITY SERPL CALC.SUM OF ELEC: 295 MOSM/KG (ref 275–295)
PLATELET # BLD AUTO: 221 10(3)UL (ref 150–450)
POTASSIUM SERPL-SCNC: 3.7 MMOL/L (ref 3.5–5.1)
PROT SERPL-MCNC: 7 G/DL (ref 5.7–8.2)
RBC # BLD AUTO: 5.2 X10(6)UL
SODIUM SERPL-SCNC: 142 MMOL/L (ref 136–145)
TRIGL SERPL-MCNC: 106 MG/DL (ref 30–149)
VLDLC SERPL CALC-MCNC: 17 MG/DL (ref 0–30)
WBC # BLD AUTO: 7.2 X10(3) UL (ref 4–11)

## 2024-03-29 PROCEDURE — 80061 LIPID PANEL: CPT

## 2024-03-29 PROCEDURE — 36415 COLL VENOUS BLD VENIPUNCTURE: CPT

## 2024-03-29 PROCEDURE — 85025 COMPLETE CBC W/AUTO DIFF WBC: CPT

## 2024-03-29 PROCEDURE — 80053 COMPREHEN METABOLIC PANEL: CPT

## 2024-04-03 ENCOUNTER — OFFICE VISIT (OUTPATIENT)
Dept: INTERNAL MEDICINE CLINIC | Facility: CLINIC | Age: 74
End: 2024-04-03
Payer: MEDICARE

## 2024-04-03 VITALS
HEIGHT: 70 IN | DIASTOLIC BLOOD PRESSURE: 70 MMHG | SYSTOLIC BLOOD PRESSURE: 150 MMHG | WEIGHT: 168 LBS | BODY MASS INDEX: 24.05 KG/M2

## 2024-04-03 DIAGNOSIS — N40.1 BPH WITH OBSTRUCTION/LOWER URINARY TRACT SYMPTOMS: ICD-10-CM

## 2024-04-03 DIAGNOSIS — I10 ESSENTIAL HYPERTENSION: ICD-10-CM

## 2024-04-03 DIAGNOSIS — Z00.00 PHYSICAL EXAM: Primary | ICD-10-CM

## 2024-04-03 DIAGNOSIS — N13.8 BPH WITH OBSTRUCTION/LOWER URINARY TRACT SYMPTOMS: ICD-10-CM

## 2024-04-03 DIAGNOSIS — I34.0 MILD MITRAL REGURGITATION: ICD-10-CM

## 2024-04-03 DIAGNOSIS — C44.619 BASAL CELL CARCINOMA OF SKIN OF LEFT UPPER LIMB, INCLUDING SHOULDER: ICD-10-CM

## 2024-04-03 DIAGNOSIS — E78.5 HYPERLIPIDEMIA, UNSPECIFIED HYPERLIPIDEMIA TYPE: ICD-10-CM

## 2024-04-03 PROCEDURE — G0439 PPPS, SUBSEQ VISIT: HCPCS | Performed by: FAMILY MEDICINE

## 2024-04-03 RX ORDER — AMLODIPINE BESYLATE 5 MG/1
5 TABLET ORAL DAILY
Qty: 90 TABLET | Refills: 0 | Status: SHIPPED | OUTPATIENT
Start: 2024-04-03 | End: 2025-03-29

## 2024-04-03 RX ORDER — ATORVASTATIN CALCIUM 10 MG/1
10 TABLET, FILM COATED ORAL DAILY
Qty: 90 TABLET | Refills: 0 | Status: SHIPPED | OUTPATIENT
Start: 2024-04-03 | End: 2025-03-29

## 2024-04-16 ENCOUNTER — LAB ENCOUNTER (OUTPATIENT)
Dept: LAB | Facility: HOSPITAL | Age: 74
End: 2024-04-16
Attending: UROLOGY
Payer: MEDICARE

## 2024-04-16 DIAGNOSIS — Z12.5 SCREENING FOR PROSTATE CANCER: Primary | ICD-10-CM

## 2024-04-16 LAB — COMPLEXED PSA SERPL-MCNC: 1.15 NG/ML (ref ?–4)

## 2024-04-16 PROCEDURE — 36415 COLL VENOUS BLD VENIPUNCTURE: CPT

## 2024-04-17 ENCOUNTER — OFFICE VISIT (OUTPATIENT)
Dept: INTERNAL MEDICINE CLINIC | Facility: CLINIC | Age: 74
End: 2024-04-17
Payer: MEDICARE

## 2024-04-17 VITALS
OXYGEN SATURATION: 99 % | SYSTOLIC BLOOD PRESSURE: 130 MMHG | HEIGHT: 70 IN | WEIGHT: 166.88 LBS | BODY MASS INDEX: 23.89 KG/M2 | HEART RATE: 63 BPM | DIASTOLIC BLOOD PRESSURE: 70 MMHG

## 2024-04-17 DIAGNOSIS — I10 ESSENTIAL HYPERTENSION: ICD-10-CM

## 2024-04-17 DIAGNOSIS — L72.9 CYST OF SKIN: Primary | ICD-10-CM

## 2024-04-17 DIAGNOSIS — L60.2 LONG TOENAIL: ICD-10-CM

## 2024-04-17 PROCEDURE — 99214 OFFICE O/P EST MOD 30 MIN: CPT | Performed by: FAMILY MEDICINE

## 2024-04-17 PROCEDURE — G2211 COMPLEX E/M VISIT ADD ON: HCPCS | Performed by: FAMILY MEDICINE

## 2024-04-17 RX ORDER — CEPHALEXIN 500 MG/1
500 CAPSULE ORAL 4 TIMES DAILY
Qty: 28 CAPSULE | Refills: 0 | Status: SHIPPED | OUTPATIENT
Start: 2024-04-17 | End: 2024-04-17

## 2024-04-17 RX ORDER — AMLODIPINE BESYLATE 5 MG/1
5 TABLET ORAL DAILY
Qty: 90 TABLET | Refills: 3 | Status: SHIPPED | OUTPATIENT
Start: 2024-04-17 | End: 2025-04-12

## 2024-04-17 RX ORDER — CEPHALEXIN 500 MG/1
500 CAPSULE ORAL 4 TIMES DAILY
Qty: 28 CAPSULE | Refills: 0 | Status: SHIPPED | OUTPATIENT
Start: 2024-04-17

## 2024-04-17 NOTE — PROGRESS NOTES
CC:  No chief complaint on file.      Hx of CC:    Cyst x 5 days R neck  Using warm compresses  No fevers  No pain  No drainage     HTN follow up  No side effects on higher dose  No CP/SOB/edema         Allergies:  Allergies   Allergen Reactions    Merthiolate Glycerite [Thimerosal] RASH    Shellfish-Derived Products SWELLING      Current Meds:  Current Outpatient Medications   Medication Sig Dispense Refill    amLODIPine 5 MG Oral Tab Take 1 tablet (5 mg total) by mouth daily. 90 tablet 0    atorvastatin (LIPITOR) 10 MG Oral Tab Take 1 tablet (10 mg total) by mouth daily. 90 tablet 0    finasteride 5 MG Oral Tab Take 1 tablet (5 mg total) by mouth daily. 90 tablet 3    amLODIPine 2.5 MG Oral Tab Take 1 tablet (2.5 mg total) by mouth daily. 90 tablet 3    lisinopril 40 MG Oral Tab Take 1 tablet (40 mg total) by mouth daily. 90 tablet 3    celecoxib 200 MG Oral Cap  (Patient not taking: Reported on 1/22/2024)      amoxicillin 500 MG Oral Cap Take two capsules one hour prior to dental work; take one capsule six hours later. 6 capsule 2    Cholecalciferol (D3 SUPER STRENGTH) 50 MCG (2000 UT) Oral Cap       triamcinolone 0.1 % External Ointment Apply topically. As needed      Cinnamon 500 MG Oral Cap       Magnesium Gluconate (MAGNESIUM 27) 500 (27 Mg) MG Oral Tab Take twice daily       Vitamins-Lipotropics (BALANCED B-100 COMPLEX CR OR)       Omega-3 Fatty Acids (FISH OIL) 500 MG Oral Cap Take by mouth daily.            History:  Past Medical History:    Back problem    Stenosis    Benign prostatic hypertrophy    Degenerative joint disease (DJD) of lumbar spine    Hearing impairment    Hearing aides    High blood pressure    Neuropathy of both feet    Osteoarthritis    Skin cancer of nose    Visual impairment    Glasses      Past Surgical History:   Procedure Laterality Date    Appendectomy      Biopsy of skin lesion  11/2016    Biopsy of skin lesion  1995    mid back skin cyst    Colonoscopy  2003    Colonoscopy N/A  03/15/2022    Procedure: COLONOSCOPY;  Surgeon: Irwin Doyle MD;  Location: [facility] ENDOSCOPY    Colonoscopy & polypectomy  10/28/2016    Contact laser surgery of prostate  2018    cysto, urethral dilt    Hip surgery Right 2023    right total hip arthroplasty    Tonsillectomy  1960    Total hip replacement Right 2023    Right total hip arthroplasty      Family History   Problem Relation Age of Onset    Hypertension Father     Hypertension Mother     Cancer Mother         breast    Hypertension Brother     Other (Pre-Diabetic) Brother       Family Status   Relation Status    Fa     Mo     Bro Alive    Bro Alive      Social History     Socioeconomic History    Marital status:     Number of children: 2   Occupational History    Occupation: [employer]     Comment: retired    Occupation:      Employer: [employer]     Comment: retired   Tobacco Use    Smoking status: Former     Current packs/day: 0.00     Average packs/day: 1 pack/day for 10.0 years (10.0 ttl pk-yrs)     Types: Cigarettes     Start date: 3/27/1970     Quit date: 3/27/1980     Years since quittin.0    Smokeless tobacco: Never   Vaping Use    Vaping status: Never Used   Substance and Sexual Activity    Alcohol use: Yes     Comment: Per NG: Drinks beer and wine, 4 -5 drinks weekly.    Drug use: Never   Other Topics Concern    Caffeine Concern Yes     Comment: Per NG: Coffee, 3 cups daily            ROS:  General:  No fever, no fatigue, no weight changes  HEENT:  Denies congestion or nasal discharge  Cardio:  No chest pain   Pulmonary:  No cough, no SOB  GI:  No N/V/D  Dermatologic:  No rashes    Physical:    There were no vitals taken for this visit.    General:  Alert, appropriate, no acute distress  HEENT:  Normocephalic, supple. Moist mucus membranes.   Cardio:  RRR, no murmurs, S1, S2  Pulmonary:  Clear bilaterally, good air entry  Dermatologic:  R side of neck below angle of  mandible, pt has 1 cm subcutaneous firm mobile lesion with white central pore. No erythema/ tenderness   EXT: no edema  MS: normal movement   NEURO: no gross deficits       Assessment and Plan:    1. Cyst of skin  No signs of infection  Recommend warm compresses tid   Refer to plastic surgery if does not resolve  If becomes red/ hot / painful or larger- start abx ( script printed to hold) and f/u for eval of I & D  - Hand & Microvascular Surgery Referral - [facility] (Dr. Bear)  - cephalexin 500 MG Oral Cap; Take 1 capsule (500 mg total) by mouth 4 (four) times daily.  Dispense: 28 capsule; Refill: 0    2. Essential hypertension  Better  Continue lisinopril 40 and amlodipine 5 mg     3. Long toenail    - Podiatry Referral - [facility] Network    Will hold off on atorvastatin    There are no diagnoses linked to this encounter.  None  No orders of the defined types were placed in this encounter.

## 2024-04-19 ENCOUNTER — TELEPHONE (OUTPATIENT)
Dept: INTERNAL MEDICINE CLINIC | Facility: CLINIC | Age: 74
End: 2024-04-19

## 2024-04-19 DIAGNOSIS — Z00.00 PHYSICAL EXAM: Primary | ICD-10-CM

## 2024-04-19 DIAGNOSIS — E78.5 HYPERLIPIDEMIA, UNSPECIFIED HYPERLIPIDEMIA TYPE: ICD-10-CM

## 2024-04-19 NOTE — TELEPHONE ENCOUNTER
D/w pt atorvastatin     The 10-year ASCVD risk score (Mora MALONEY, et al., 2019) is: 25.2%    Values used to calculate the score:      Age: 74 years      Sex: Male      Is Non- : No      Diabetic: No      Tobacco smoker: No      Systolic Blood Pressure: 130 mmHg      Is BP treated: Yes      HDL Cholesterol: 55 mg/dL      Total Cholesterol: 170 mg/dL    He is interested in going on it    D/w him side effects    Recheck labs 2 mos    Call if any problems    Can cancel calcium score    
yes

## 2024-05-06 ENCOUNTER — HOSPITAL ENCOUNTER (OUTPATIENT)
Dept: CV DIAGNOSTICS | Facility: HOSPITAL | Age: 74
Discharge: HOME | End: 2024-05-06
Attending: FAMILY MEDICINE
Payer: MEDICARE

## 2024-05-06 DIAGNOSIS — I34.0 MILD MITRAL REGURGITATION: ICD-10-CM

## 2024-05-06 DIAGNOSIS — I10 ESSENTIAL HYPERTENSION: ICD-10-CM

## 2024-05-06 PROCEDURE — 93306 TTE W/DOPPLER COMPLETE: CPT | Performed by: FAMILY MEDICINE

## 2024-05-09 DIAGNOSIS — T75.3XXA MOTION SICKNESS, INITIAL ENCOUNTER: Primary | ICD-10-CM

## 2024-05-09 RX ORDER — MECLIZINE HYDROCHLORIDE 25 MG/1
25 TABLET ORAL 3 TIMES DAILY PRN
Qty: 30 TABLET | Refills: 0 | Status: SHIPPED | OUTPATIENT
Start: 2024-05-09

## 2024-05-13 ENCOUNTER — OFFICE VISIT (OUTPATIENT)
Dept: PODIATRY CLINIC | Facility: CLINIC | Age: 74
End: 2024-05-13
Payer: MEDICARE

## 2024-05-13 DIAGNOSIS — M79.674 TOE PAIN, BILATERAL: ICD-10-CM

## 2024-05-13 DIAGNOSIS — M79.675 TOE PAIN, BILATERAL: ICD-10-CM

## 2024-05-13 DIAGNOSIS — L60.3 ONYCHODYSTROPHY: Primary | ICD-10-CM

## 2024-05-13 PROCEDURE — 99213 OFFICE O/P EST LOW 20 MIN: CPT | Performed by: PODIATRIST

## 2024-05-13 NOTE — PROGRESS NOTES
[facility] Podiatry  Progress Note    Flash New is a 74 year old male.   Chief Complaint   Patient presents with    Toenail Care     3MO f/u  -Pt here for nail trim. Denies pain or concerns.         HPI:     This is a pleasant male with PMH of lumbar DDD.      He presents to clinic today for nail care.  He complains of long thick painful toenails which he is unable to trim himself due to having hip replacement.          Allergies: Merthiolate glycerite [thimerosal] and Shellfish-derived products   Current Outpatient Medications   Medication Sig Dispense Refill    meclizine 25 MG Oral Tab Take 1 tablet (25 mg total) by mouth 3 (three) times daily as needed. 30 tablet 0    atorvastatin 20 MG Oral Tab Take 1 tablet (20 mg total) by mouth nightly.      cephalexin 500 MG Oral Cap Take 1 capsule (500 mg total) by mouth 4 (four) times daily. 28 capsule 0    amLODIPine 5 MG Oral Tab Take 1 tablet (5 mg total) by mouth daily. 90 tablet 3    finasteride 5 MG Oral Tab Take 1 tablet (5 mg total) by mouth daily. 90 tablet 3    lisinopril 40 MG Oral Tab Take 1 tablet (40 mg total) by mouth daily. 90 tablet 3    celecoxib 200 MG Oral Cap       amoxicillin 500 MG Oral Cap Take two capsules one hour prior to dental work; take one capsule six hours later. 6 capsule 2    Cholecalciferol (D3 SUPER STRENGTH) 50 MCG (2000 UT) Oral Cap       triamcinolone 0.1 % External Ointment Apply topically. As needed      Cinnamon 500 MG Oral Cap       Magnesium Gluconate (MAGNESIUM 27) 500 (27 Mg) MG Oral Tab Take twice daily       Vitamins-Lipotropics (BALANCED B-100 COMPLEX CR OR)       Omega-3 Fatty Acids (FISH OIL) 500 MG Oral Cap Take by mouth daily.          Past Medical History:    Back problem    Stenosis    Benign prostatic hypertrophy    Degenerative joint disease (DJD) of lumbar spine    Hearing impairment    Hearing aides    High blood pressure    Neuropathy of both feet    Osteoarthritis    Skin cancer of nose    Visual  impairment    Glasses      Past Surgical History:   Procedure Laterality Date    Appendectomy      Biopsy of skin lesion  2016    Biopsy of skin lesion      mid back skin cyst    Colonoscopy      Colonoscopy N/A 03/15/2022    Procedure: COLONOSCOPY;  Surgeon: Irwin Doyle MD;  Location: [facility] ENDOSCOPY    Colonoscopy & polypectomy  10/28/2016    Contact laser surgery of prostate  2018    cysto, urethral dilt    Hip surgery Right 2023    right total hip arthroplasty    Tonsillectomy  1960    Total hip replacement Right 2023    Right total hip arthroplasty      Family History   Problem Relation Age of Onset    Hypertension Father     Hypertension Mother     Cancer Mother         breast    Hypertension Brother     Other (Pre-Diabetic) Brother       Social History     Socioeconomic History    Marital status:     Number of children: 2   Occupational History    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    Drug use: Never   Other Topics Concern    Caffeine Concern Yes     Comment: Per NG: Coffee, 3 cups daily           REVIEW OF SYSTEMS:   Denies nausea, fever, chills  No calf pain  No other muscle or joint aches  Denies chest pain or shortness of breath.      EXAM:   There were no vitals taken for this visit.    Constitutional:   Patient in no apparent distress. Well kept. Of normal body habitus. Alert and oriented to person, place, and time.  Vascular Examination:  MICK  pulse is 2/4  PT pulse is 2/4  Capillary refill is immediate  Integumentary Examination:   The patient's nails appear incurvated, thickened, elongated, dystrophic,  1-5 right, 1-5  left nails.  Digital hair growth is present left and is present right.  Skin is of good turgor  Neurological Examination:  Sharp/dull is present to right and is present to left.  Parasthesias absent.  Musculoskeletal Examination:  Muscle Strength is 5/5.    Pain on palpation to nails.          LABS & IMAGING:     Lab Results   Component Value Date    GLU 85 03/29/2024    BUN 18 03/29/2024    CREATSERUM 1.01 03/29/2024    BUNCREA 17.8 03/29/2024    ANIONGAP 5 03/29/2024    GFRAA 103 10/19/2021    GFRNAA 89 10/19/2021    CA 9.2 03/29/2024     03/29/2024    K 3.7 03/29/2024     03/29/2024    CO2 30.0 03/29/2024    OSMOCALC 295 03/29/2024        No results found for: \"EAG\", \"A1C\"     No results found.     ASSESSMENT AND PLAN:   Diagnoses and all orders for this visit:    Onychodystrophy    Toe pain, bilateral                Plan:     Reviewed treatment options for nail dystrophy including:   No treatment and monitoring, topical meds, oral meds, nail removal  Topical meds are much safer yet carry very low efficacy.  Pt has opted for debridement of nails and monitoring.    Evaluated the patient. Discussed treatment options with the patient.  Discussed with patient proper care and hygiene for their feet.  Patient tolerated procedures well, without incident.    Instrumentation used includes nail nippers and electric  where appropriate.  Procedure: (95189 Debridement of toenails 6-10) Surgically debrided and mechanically reduced 6 or more toenails.Hemmorhage occurred none.Nails that were debrided appeared dystrophic and caused the patient pain in shoe gear.Nails 5 Left & 5 Right.      RTC 3 months for nail care    No follow-ups on file.    Silviano Vieira DPM  5/13/24

## 2024-05-20 RX ORDER — ATORVASTATIN CALCIUM 20 MG/1
20 TABLET, FILM COATED ORAL NIGHTLY
Qty: 30 TABLET | Refills: 0 | Status: SHIPPED | OUTPATIENT
Start: 2024-05-20

## 2024-05-28 RX ORDER — ATORVASTATIN CALCIUM 20 MG/1
20 TABLET, FILM COATED ORAL NIGHTLY
Qty: 30 TABLET | Refills: 0 | OUTPATIENT
Start: 2024-05-28

## 2024-07-02 DIAGNOSIS — E78.5 HYPERLIPIDEMIA, UNSPECIFIED HYPERLIPIDEMIA TYPE: ICD-10-CM

## 2024-07-02 RX ORDER — ATORVASTATIN CALCIUM 10 MG/1
10 TABLET, FILM COATED ORAL DAILY
Qty: 90 TABLET | Refills: 0 | OUTPATIENT
Start: 2024-07-02

## 2024-07-08 ENCOUNTER — APPOINTMENT (OUTPATIENT)
Dept: URBAN - METROPOLITAN AREA CLINIC 244 | Age: 74
Setting detail: DERMATOLOGY
End: 2024-07-10

## 2024-07-08 DIAGNOSIS — L82.1 OTHER SEBORRHEIC KERATOSIS: ICD-10-CM

## 2024-07-08 DIAGNOSIS — L20.89 OTHER ATOPIC DERMATITIS: ICD-10-CM

## 2024-07-08 DIAGNOSIS — L21.8 OTHER SEBORRHEIC DERMATITIS: ICD-10-CM

## 2024-07-08 DIAGNOSIS — D22 MELANOCYTIC NEVI: ICD-10-CM

## 2024-07-08 DIAGNOSIS — Z85.828 PERSONAL HISTORY OF OTHER MALIGNANT NEOPLASM OF SKIN: ICD-10-CM

## 2024-07-08 DIAGNOSIS — L81.4 OTHER MELANIN HYPERPIGMENTATION: ICD-10-CM

## 2024-07-08 PROBLEM — D22.5 MELANOCYTIC NEVI OF TRUNK: Status: ACTIVE | Noted: 2024-07-08

## 2024-07-08 PROCEDURE — 99214 OFFICE O/P EST MOD 30 MIN: CPT

## 2024-07-08 PROCEDURE — OTHER SEPARATE AND IDENTIFIABLE DOCUMENTATION: OTHER

## 2024-07-08 PROCEDURE — OTHER GENTLE SKIN CARE INSTRUCTIONS: OTHER

## 2024-07-08 PROCEDURE — OTHER PRESCRIPTION: OTHER

## 2024-07-08 PROCEDURE — OTHER COUNSELING: OTHER

## 2024-07-08 RX ORDER — TRIAMCINOLONE ACETONIDE 1 MG/G
OINTMENT TOPICAL
Qty: 30 | Refills: 0 | Status: ERX | COMMUNITY
Start: 2024-07-08

## 2024-07-08 RX ORDER — FLUOCINONIDE 0.5 MG/ML
SOLUTION TOPICAL
Qty: 60 | Refills: 6 | Status: ERX | COMMUNITY
Start: 2024-07-08

## 2024-07-08 ASSESSMENT — LOCATION SIMPLE DESCRIPTION DERM
LOCATION SIMPLE: LEFT SHOULDER
LOCATION SIMPLE: ABDOMEN
LOCATION SIMPLE: UPPER BACK
LOCATION SIMPLE: ANTERIOR SCALP
LOCATION SIMPLE: NOSE

## 2024-07-08 ASSESSMENT — LOCATION ZONE DERM
LOCATION ZONE: SCALP
LOCATION ZONE: ARM
LOCATION ZONE: TRUNK
LOCATION ZONE: NOSE

## 2024-07-08 ASSESSMENT — LOCATION DETAILED DESCRIPTION DERM
LOCATION DETAILED: NASAL DORSUM
LOCATION DETAILED: SUPERIOR THORACIC SPINE
LOCATION DETAILED: LEFT POSTERIOR SHOULDER
LOCATION DETAILED: PERIUMBILICAL SKIN
LOCATION DETAILED: MID-FRONTAL SCALP

## 2024-07-08 ASSESSMENT — BSA RASH: BSA RASH: 2

## 2024-07-08 ASSESSMENT — ITCH NUMERIC RATING SCALE: HOW SEVERE IS YOUR ITCHING?: 5

## 2024-07-15 ENCOUNTER — LAB ENCOUNTER (OUTPATIENT)
Dept: LAB | Facility: REFERENCE LAB | Age: 74
End: 2024-07-15
Attending: FAMILY MEDICINE
Payer: MEDICARE

## 2024-07-15 DIAGNOSIS — E78.5 HYPERLIPIDEMIA, UNSPECIFIED HYPERLIPIDEMIA TYPE: ICD-10-CM

## 2024-07-15 LAB
ALBUMIN SERPL-MCNC: 4.5 G/DL (ref 3.2–4.8)
ALBUMIN/GLOB SERPL: 1.7 {RATIO} (ref 1–2)
ALP LIVER SERPL-CCNC: 89 U/L
ALT SERPL-CCNC: 13 U/L
ANION GAP SERPL CALC-SCNC: 5 MMOL/L (ref 0–18)
AST SERPL-CCNC: 21 U/L (ref ?–34)
BILIRUB SERPL-MCNC: 1.5 MG/DL (ref 0.2–1.1)
BUN BLD-MCNC: 15 MG/DL (ref 9–23)
BUN/CREAT SERPL: 15.8 (ref 10–20)
CALCIUM BLD-MCNC: 9.1 MG/DL (ref 8.7–10.4)
CHLORIDE SERPL-SCNC: 107 MMOL/L (ref 98–112)
CHOLEST SERPL-MCNC: 120 MG/DL (ref ?–200)
CO2 SERPL-SCNC: 29 MMOL/L (ref 21–32)
CREAT BLD-MCNC: 0.95 MG/DL
EGFRCR SERPLBLD CKD-EPI 2021: 84 ML/MIN/1.73M2 (ref 60–?)
FASTING PATIENT LIPID ANSWER: YES
FASTING STATUS PATIENT QL REPORTED: YES
GLOBULIN PLAS-MCNC: 2.7 G/DL (ref 2–3.5)
GLUCOSE BLD-MCNC: 89 MG/DL (ref 70–99)
HDLC SERPL-MCNC: 56 MG/DL (ref 40–59)
LDLC SERPL CALC-MCNC: 45 MG/DL (ref ?–100)
NONHDLC SERPL-MCNC: 64 MG/DL (ref ?–130)
OSMOLALITY SERPL CALC.SUM OF ELEC: 292 MOSM/KG (ref 275–295)
POTASSIUM SERPL-SCNC: 3.8 MMOL/L (ref 3.5–5.1)
PROT SERPL-MCNC: 7.2 G/DL (ref 5.7–8.2)
SODIUM SERPL-SCNC: 141 MMOL/L (ref 136–145)
TRIGL SERPL-MCNC: 103 MG/DL (ref 30–149)
VLDLC SERPL CALC-MCNC: 14 MG/DL (ref 0–30)

## 2024-07-15 PROCEDURE — 36415 COLL VENOUS BLD VENIPUNCTURE: CPT

## 2024-07-15 PROCEDURE — 80053 COMPREHEN METABOLIC PANEL: CPT

## 2024-07-15 PROCEDURE — 80061 LIPID PANEL: CPT

## 2024-07-16 NOTE — PROGRESS NOTES
CC:  No chief complaint on file.      Hx of CC:    Follow up HTN  Now on amlodipine 5 and lisinopril 40  Blood pressures have been well controlled  No CP or SOB      Started on atorvastatin for elevated ASCVD  Tolerating well without side effects    Allergies:  Allergies   Allergen Reactions    Merthiolate Glycerite [Thimerosal] RASH    Shellfish-Derived Products SWELLING      Current Meds:  Current Outpatient Medications   Medication Sig Dispense Refill    Fluocinonide 0.05 % External Solution Apply 0.05 % topically as needed.      atorvastatin 20 MG Oral Tab Take 1 tablet (20 mg total) by mouth nightly. 30 tablet 0    amLODIPine 5 MG Oral Tab Take 1 tablet (5 mg total) by mouth daily. 90 tablet 3    finasteride 5 MG Oral Tab Take 1 tablet (5 mg total) by mouth daily. 90 tablet 3    lisinopril 40 MG Oral Tab Take 1 tablet (40 mg total) by mouth daily. 90 tablet 3    celecoxib 200 MG Oral Cap       Cholecalciferol (D3 SUPER STRENGTH) 50 MCG (2000 UT) Oral Cap       triamcinolone 0.1 % External Ointment Apply topically. As needed      Cinnamon 500 MG Oral Cap       Magnesium Gluconate (MAGNESIUM 27) 500 (27 Mg) MG Oral Tab Take twice daily       Vitamins-Lipotropics (BALANCED B-100 COMPLEX CR OR)       Omega-3 Fatty Acids (FISH OIL) 500 MG Oral Cap Take by mouth daily.        meclizine 25 MG Oral Tab Take 1 tablet (25 mg total) by mouth 3 (three) times daily as needed. (Patient not taking: Reported on 7/17/2024) 30 tablet 0    cephalexin 500 MG Oral Cap Take 1 capsule (500 mg total) by mouth 4 (four) times daily. (Patient not taking: Reported on 7/17/2024) 28 capsule 0    amoxicillin 500 MG Oral Cap Take two capsules one hour prior to dental work; take one capsule six hours later. (Patient not taking: Reported on 7/17/2024) 6 capsule 2        History:  Past Medical History:    Back problem    Stenosis    Benign prostatic hypertrophy    Degenerative joint disease (DJD) of lumbar spine    Hearing impairment    Hearing  aides    High blood pressure    Neuropathy of both feet    Osteoarthritis    Skin cancer of nose    Visual impairment    Glasses      Past Surgical History:   Procedure Laterality Date    Appendectomy      Biopsy of skin lesion  2016    Biopsy of skin lesion      mid back skin cyst    Colonoscopy      Colonoscopy N/A 03/15/2022    Procedure: COLONOSCOPY;  Surgeon: Irwin Doyle MD;  [facility] ENDOSCOPY    Colonoscopy & polypectomy  10/28/2016    Contact laser surgery of prostate  2018    cysto, urethral dilt    Hip surgery Right 2023    right total hip arthroplasty    Tonsillectomy  1960    Total hip replacement Right 2023    Right total hip arthroplasty      Family History   Problem Relation Age of Onset    Hypertension Father     Hypertension Mother     Cancer Mother         breast    Hypertension Brother     Other (Pre-Diabetic) Brother       Family Status   Relation Status    Fa     Mo     Bro Alive    Bro Alive      Social History     Socioeconomic History    Marital status:     Number of children: 2   Occupational History    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    Drug use: Never   Other Topics Concern    Caffeine Concern Yes     Comment: Per NG: Coffee, 3 cups daily            ROS:  General:  No fever, no fatigue, no weight changes  Cardio:  No chest pain   Pulmonary:  No cough, no SOB      Physical:    /70   Pulse 62   Ht 5'  10\" (1.778 m)   Wt 161 lb (73 kg)   SpO2 98%   BMI 23.10 kg/m²     General:  Alert, appropriate, no acute distress  HEENT:  Normocephalic, supple. Moist mucus membranes.   Cardio:  RRR, no murmurs, S1, S2  Pulmonary:  Clear bilaterally, good air entry  EXT: no edema  MS: normal movement   NEURO: no gross deficits     Assessment and Plan:    1. Essential hypertension  Better today  Continue amlodipine 5 mg and lisinopril 40 mg     2. Hyperlipidemia, unspecified hyperlipidemia type  Lipids good  Tolerating statin  Continue atorvastatin   Refilled 10 mg atorvastatin for the year       There are no diagnoses linked to this encounter.  None  No orders of the defined types were placed in this encounter.

## 2024-07-17 ENCOUNTER — OFFICE VISIT (OUTPATIENT)
Dept: INTERNAL MEDICINE CLINIC | Facility: CLINIC | Age: 74
End: 2024-07-17
Payer: MEDICARE

## 2024-07-17 VITALS
HEART RATE: 62 BPM | BODY MASS INDEX: 23.05 KG/M2 | DIASTOLIC BLOOD PRESSURE: 70 MMHG | WEIGHT: 161 LBS | SYSTOLIC BLOOD PRESSURE: 120 MMHG | HEIGHT: 70 IN | OXYGEN SATURATION: 98 %

## 2024-07-17 DIAGNOSIS — I10 ESSENTIAL HYPERTENSION: Primary | ICD-10-CM

## 2024-07-17 DIAGNOSIS — E78.5 HYPERLIPIDEMIA, UNSPECIFIED HYPERLIPIDEMIA TYPE: ICD-10-CM

## 2024-07-17 PROCEDURE — 99214 OFFICE O/P EST MOD 30 MIN: CPT | Performed by: FAMILY MEDICINE

## 2024-07-17 PROCEDURE — G2211 COMPLEX E/M VISIT ADD ON: HCPCS | Performed by: FAMILY MEDICINE

## 2024-07-17 RX ORDER — FLUOCINONIDE TOPICAL SOLUTION USP, 0.05% 0.5 MG/ML
0.05 SOLUTION TOPICAL AS NEEDED
COMMUNITY
Start: 2024-07-09

## 2024-07-17 RX ORDER — ATORVASTATIN CALCIUM 10 MG/1
10 TABLET, FILM COATED ORAL DAILY
Qty: 90 TABLET | Refills: 3 | Status: SHIPPED | OUTPATIENT
Start: 2024-07-17 | End: 2025-07-12

## 2024-08-13 ENCOUNTER — OFFICE VISIT (OUTPATIENT)
Dept: PODIATRY CLINIC | Facility: CLINIC | Age: 74
End: 2024-08-13
Payer: MEDICARE

## 2024-08-13 DIAGNOSIS — L60.3 ONYCHODYSTROPHY: ICD-10-CM

## 2024-08-13 DIAGNOSIS — M79.674 TOE PAIN, BILATERAL: Primary | ICD-10-CM

## 2024-08-13 DIAGNOSIS — M79.675 TOE PAIN, BILATERAL: Primary | ICD-10-CM

## 2024-08-13 PROCEDURE — 99213 OFFICE O/P EST LOW 20 MIN: CPT | Performed by: STUDENT IN AN ORGANIZED HEALTH CARE EDUCATION/TRAINING PROGRAM

## 2024-08-13 NOTE — PROGRESS NOTES
[facility] Podiatry  Progress Note      Flash New is a 74 year old male.   Chief Complaint   Patient presents with    Toenail Care     F/u Toenail Care. Elderly patient unable to trim toenails due to medical condition. On 05/13/24 LOV with Isha.             HPI:     Patient is a pleasant 74-year-old male presents to clinic today for bilateral foot evaluation.  Admits to elongated toenails he is unable to trim himself.      Allergies: Merthiolate glycerite [thimerosal] and Shellfish-derived products    Current Outpatient Medications   Medication Sig Dispense Refill    Fluocinonide 0.05 % External Solution Apply 0.05 % topically as needed.      atorvastatin (LIPITOR) 10 MG Oral Tab Take 1 tablet (10 mg total) by mouth daily. 90 tablet 3    meclizine 25 MG Oral Tab Take 1 tablet (25 mg total) by mouth 3 (three) times daily as needed. 30 tablet 0    cephalexin 500 MG Oral Cap Take 1 capsule (500 mg total) by mouth 4 (four) times daily. 28 capsule 0    amLODIPine 5 MG Oral Tab Take 1 tablet (5 mg total) by mouth daily. 90 tablet 3    finasteride 5 MG Oral Tab Take 1 tablet (5 mg total) by mouth daily. 90 tablet 3    lisinopril 40 MG Oral Tab Take 1 tablet (40 mg total) by mouth daily. 90 tablet 3    celecoxib 200 MG Oral Cap       amoxicillin 500 MG Oral Cap Take two capsules one hour prior to dental work; take one capsule six hours later. 6 capsule 2    Cholecalciferol (D3 SUPER STRENGTH) 50 MCG (2000 UT) Oral Cap       triamcinolone 0.1 % External Ointment Apply topically. As needed      Cinnamon 500 MG Oral Cap       Magnesium Gluconate (MAGNESIUM 27) 500 (27 Mg) MG Oral Tab Take twice daily       Vitamins-Lipotropics (BALANCED B-100 COMPLEX CR OR)       Omega-3 Fatty Acids (FISH OIL) 500 MG Oral Cap Take by mouth daily.          Past Medical History:    Back problem    Stenosis    Benign prostatic hypertrophy    Degenerative joint disease (DJD) of lumbar spine    Hearing impairment    Hearing  aides    High blood pressure    Neuropathy of both feet    Osteoarthritis    Skin cancer of nose    Visual impairment    Glasses      Past Surgical History:   Procedure Laterality Date    Appendectomy      Biopsy of skin lesion  2016    Biopsy of skin lesion      mid back skin cyst    Colonoscopy      Colonoscopy N/A 03/15/2022    Procedure: COLONOSCOPY;  Surgeon: Irwin Doyle MD;  Location: [facility] ENDOSCOPY    Colonoscopy & polypectomy  10/28/2016    Contact laser surgery of prostate  2018    cysto, urethral dilt    Hip surgery Right 2023    right total hip arthroplasty    Tonsillectomy  1960    Total hip replacement Right 2023    Right total hip arthroplasty      Family History   Problem Relation Age of Onset    Hypertension Father     Hypertension Mother     Cancer Mother         breast    Hypertension Brother     Other (Pre-Diabetic) Brother       Social History     Socioeconomic History    Marital status:     Number of children: 2   Occupational History    Occupation: [employer]     Comment: retired    Occupation:      Employer: [employer]     Comment: retired   Tobacco Use    Smoking status: Former     Current packs/day: 0.00     Average packs/day: 1 pack/day for 10.0 years (10.0 ttl pk-yrs)     Types: Cigarettes     Start date: 3/27/1970     Quit date: 3/27/1980     Years since quittin.4    Smokeless tobacco: Never   Vaping Use    Vaping status: Never Used   Substance and Sexual Activity    Alcohol use: Yes     Comment: Per NG: Drinks beer and wine, 4 -5 drinks weekly.    Drug use: Never   Other Topics Concern    Caffeine Concern Yes     Comment: Per NG: Coffee, 3 cups daily           REVIEW OF SYSTEMS:     Denies nause, fever, chills  No calf pain  Denies chest pain or SOB      EXAM:   There were no vitals taken for this visit.  GENERAL: well developed, well nourished, in no apparent distress  EXTREMITIES:   1. Integument: Normal  skin temperature and turgor. Toenails x10 are elongated, thickened and discolored with subungal derbi.     2. Vascular: Dorsalis pedis two out of four bilateral and posterior tibial pulses two out of   four bilateral, capillary refill normal.   3. Musculoskeletal: All muscle groups are graded 5 out of 5 in the foot and ankle.   4. Neurological: Normal sharp dull sensation; reflexes normal.             ASSESSMENT AND PLAN:   Diagnoses and all orders for this visit:    Toe pain, bilateral    Onychodystrophy        Plan:         -Patient examined, chart history reviewed.  -Discussed importance of proper pedal hygiene, regular foot checks, and tight glucose control.  -Sharply debrided nails x10 with a sterile nail nipper achieving a 20% reduction in thickness and length, without incident.   -Ambulate with supportive shoes and inserts and avoid walking barefoot.  -Educated patient on acute signs of infection advised patient to seek immediate medical attention if symptoms arise.    RTC in 3 months  The patient indicates understanding of these issues and agrees to the plan.        Christina Adrian DPM

## 2024-09-03 ENCOUNTER — OFFICE VISIT (OUTPATIENT)
Dept: OTOLARYNGOLOGY | Facility: CLINIC | Age: 74
End: 2024-09-03
Payer: MEDICARE

## 2024-09-03 VITALS — WEIGHT: 161 LBS | HEIGHT: 70 IN | BODY MASS INDEX: 23.05 KG/M2

## 2024-09-03 DIAGNOSIS — H61.23 BILATERAL IMPACTED CERUMEN: Primary | ICD-10-CM

## 2024-09-03 PROCEDURE — 69210 REMOVE IMPACTED EAR WAX UNI: CPT | Performed by: SPECIALIST

## 2024-09-08 DIAGNOSIS — I10 ESSENTIAL HYPERTENSION: ICD-10-CM

## 2024-09-09 RX ORDER — LISINOPRIL 40 MG/1
40 TABLET ORAL DAILY
Qty: 90 TABLET | Refills: 0 | Status: SHIPPED | OUTPATIENT
Start: 2024-09-09

## 2024-10-16 ENCOUNTER — OFFICE VISIT (OUTPATIENT)
Dept: AUDIOLOGY | Facility: CLINIC | Age: 74
End: 2024-10-16

## 2024-10-16 DIAGNOSIS — H90.3 SENSORINEURAL HEARING LOSS, ASYMMETRICAL: Primary | ICD-10-CM

## 2024-10-16 PROCEDURE — 92593 HEARING AID CHECK, BOTH EARS: CPT | Performed by: AUDIOLOGIST

## 2024-10-16 NOTE — PROGRESS NOTES
HEARING AID FOLLOW-UP    Flash Dobbs Virgen  1/9/1950  [id number]    Patient is here for annual hearing aid check.     Hearing aid information:   Phonak Bolero V70P  Right #[id number]  Coupled to custom earmold with standard tubing  Left # [id number] coupled to slim tube and large open dome  Date dispensed:  10-4-16  Battery:  13    The patient has the following concerns:   Overall doing well and pleased with aids.   Only situation that is frustrating for him is noisy restaurant.   Not interested in upgrading technology as long as these aids are working.   Aids are currently 8 years old.       In office the following actions were taken:  Cleaned aid  Cleaned earmold  Suctioned  port  Listening check  Air conduction and speech testing completed.     Patient is to follow up in 1 year or sooner as needed.     10/16/2024  Gay Martinez

## 2024-11-15 NOTE — PROGRESS NOTES
Subjective:   Flash New is a 74 year old male who presents for a Medicare Subsequent Annual Wellness visit (Pt already had Initial Annual Wellness) and scheduled follow up of multiple significant but stable problems.           Hx HTN-   On amlodipine 2.5 mg and lisinopril 40 mg      Sees urology for BPH Dr Libia DUNCAN with dermatology- hx BCC- Dr Medina     Feels well  No CP or SOB  Does exercise a few times a week  at [employer]      Has hearing aids   Sees eye doctor on regular basis     Mood ok  Brother  from a stroke     Patient Active Problem List   Diagnosis    BPH with obstruction/lower urinary tract symptoms    Essential hypertension    Degenerative disc disease, lumbar    Basal cell carcinoma of skin of left upper limb, including shoulder    Seborrheic dermatitis    Chronic right-sided low back pain with right-sided sciatica    Avascular necrosis of bone of right hip (HCC)    H/O total hip arthroplasty, right    Calf swelling        History/Other:   Fall Risk Assessment:   He has been screened for Falls and is low risk.      Cognitive Assessment:   He had a completely normal cognitive assessment - see flowsheet entries       Functional Ability/Status:   Flash New has some abnormal functions as listed below:  He has Hearing problems based on screening of functional status.He has Vision problems based on screening of functional status.       Depression Screening (PHQ-2/PHQ-9): PHQ-2 SCORE: 0  , done 4/3/2024             Advanced Directives:   He does have a Living Will but we do NOT have it on file in Epic.    He does have a POA but we do NOT have it on file in Epic.    Discussed Advance Care Planning with patient (and family/surrogate if present). Standard forms made available to patient in After Visit Summary.      Patient Active Problem List   Diagnosis    BPH with obstruction/lower urinary tract symptoms    Essential hypertension    Degenerative disc disease, lumbar     Basal cell carcinoma of skin of left upper limb, including shoulder    Seborrheic dermatitis    Chronic right-sided low back pain with right-sided sciatica    Avascular necrosis of bone of right hip (HCC)    H/O total hip arthroplasty, right    Calf swelling     Allergies:  He is allergic to merthiolate glycerite [thimerosal] and shellfish-derived products.    Current Medications:  Outpatient Medications Marked as Taking for the 4/3/24 encounter (Office Visit) with Wendie Wolf MD   Medication Sig    amLODIPine 5 MG Oral Tab Take 1 tablet (5 mg total) by mouth daily.    atorvastatin (LIPITOR) 10 MG Oral Tab Take 1 tablet (10 mg total) by mouth daily.    finasteride 5 MG Oral Tab Take 1 tablet (5 mg total) by mouth daily.    amLODIPine 2.5 MG Oral Tab Take 1 tablet (2.5 mg total) by mouth daily.    lisinopril 40 MG Oral Tab Take 1 tablet (40 mg total) by mouth daily.    amoxicillin 500 MG Oral Cap Take two capsules one hour prior to dental work; take one capsule six hours later.    Cholecalciferol (D3 SUPER STRENGTH) 50 MCG (2000 UT) Oral Cap     triamcinolone 0.1 % External Ointment Apply topically. As needed    Cinnamon 500 MG Oral Cap     Magnesium Gluconate (MAGNESIUM 27) 500 (27 Mg) MG Oral Tab Take twice daily     Vitamins-Lipotropics (BALANCED B-100 COMPLEX CR OR)     Omega-3 Fatty Acids (FISH OIL) 500 MG Oral Cap Take by mouth daily.         Medical History:  He  has a past medical history of Back problem, Benign prostatic hypertrophy, Degenerative joint disease (DJD) of lumbar spine, Hearing impairment, High blood pressure, Neuropathy of both feet, Osteoarthritis, Skin cancer of nose (11/2016), and Visual impairment.  Surgical History:  He  has a past surgical history that includes tonsillectomy (1960); appendectomy; colonoscopy (2003); colonoscopy & polypectomy (10/28/2016); biopsy of skin lesion (11/2016); contact laser surgery of prostate (04/03/2018); biopsy of skin lesion (1995); colonoscopy  (N/A, 03/15/2022); total hip replacement (Right, 2023); and hip surgery (Right, 2023).   Family History:  His family history includes Cancer in his mother; Hypertension in his brother, father, and mother; Pre-Diabetic in his brother.  Social History:  He  reports that he quit smoking about 44 years ago. His smoking use included cigarettes. He has a 10 pack-year smoking history. He has never used smokeless tobacco. He reports current alcohol use. He reports that he does not use drugs.    Tobacco:  He smoked tobacco in the past but quit greater than 12 months ago.  Social History    Tobacco Use      Smoking status: Former        Packs/day: 1.00        Years: 10.00        Additional pack years: 0.00        Total pack years: 10.00        Types: Cigarettes        Quit date: 3/27/1980        Years since quittin.0      Smokeless tobacco: Never       CAGE Alcohol Screen:   CAGE screening score of 0 on 4/3/2024, showing low risk of alcohol abuse.      Patient Care Team:  Joyce Granger MD as PCP - General (Family Medicine)  Mike Youssef MD (Anesthesiology)  Demetri Laird MD (Anesthesiology)  Griffin Philippe MD (Anesthesiology)  JOYCE GRANGER as PCP  Chaim Taylor MD (Radiation Oncology)    Review of Systems         GENERAL: feels well , no fevers, no weight changes  SKIN: no new issues, sees Dr Medina  EYES:vision stable  HEENT: denies nasal congestion or sore throat  LUNGS: denies shortness of breath with exertion  CARDIOVASCULAR: denies chest pain on exertion  GI: no abd pain, diarrhea or constipation   : denies dysuria + some nocturia  MUSCULOSKELETAL: denies muscle pain  NEURO: denies headaches,   ENDOCRINE: no hx of DM or thyroid problems     Objective:   Physical Exam     /70   Ht 5' 10\" (1.778 m)   Wt 168 lb (76.2 kg)   BMI 24.11 kg/m²  Estimated body mass index is 24.11 kg/m² as calculated from the following:    Height as of this encounter: 5' 10\" (1.778 m).    Weight  as of this encounter: 168 lb (76.2 kg).    Medicare Hearing Assessment:   Hearing Screening    Time taken: 4/3/2024  8:08 PM  Entry User: Wendie Wolf MD  Screening Method: Finger Rub  Finger Rub Result: Pass (Comment: borderline on left, has hearing aids)               1. Physical exam  Reviewed labs     2. Essential hypertension  Needs better control  Increase amlodipine to 5 mg  F/u 4 weeks  Call if any problems on new dose  Monitor blood pressure at home   - amLODIPine 5 MG Oral Tab; Take 1 tablet (5 mg total) by mouth daily.  Dispense: 90 tablet; Refill: 0  - CARD ECHO 2D DOPPLER (CPT=93306); Future  - CT CALCIUM SCORING; Future    3. Mild mitral regurgitation  History of, last echo 5 years ago   - CARD ECHO 2D DOPPLER (CPT=93306); Future    4. Basal cell carcinoma of skin of left upper limb, including shoulder    F/u derm    5. BPH with obstruction/lower urinary tract symptoms    Just saw urology    6. Hyperlipidemia, unspecified hyperlipidemia type  ASCVD 31 %  Discussed options with pt- monitor , calcium score ,vs start atorvastatin 10 mg   Pt agreeable to starting statin d/w him SE including myalgias. Recheck lipids 6 weeks  Also can get CT calcium score still for further eval.  - atorvastatin (LIPITOR) 10 MG Oral Tab; Take 1 tablet (10 mg total) by mouth daily.  Dispense: 90 tablet; Refill: 0        The 10-year ASCVD risk score (Mora MALONEY, et al., 2019) is: 31.3%    Values used to calculate the score:      Age: 74 years      Sex: Male      Is Non- : No      Diabetic: No      Tobacco smoker: No      Systolic Blood Pressure: 150 mmHg      Is BP treated: Yes      HDL Cholesterol: 55 mg/dL      Total Cholesterol: 170 mg/dL    Assessment & Plan:   Flash New is a 74 year old male who presents for a Medicare Assessment.     1. Physical exam (Primary)  2. Essential hypertension  -     amLODIPine Besylate; Take 1 tablet (5 mg total) by mouth daily.  Dispense: 90 tablet;  Refill: 0  -     CARD ECHO 2D DOPPLER (CPT=93306); Future; Expected date: 04/03/2024  -     CT CALCIUM SCORING; Future; Expected date: 04/03/2024  3. Mild mitral regurgitation  -     CARD ECHO 2D DOPPLER (CPT=93306); Future; Expected date: 04/03/2024  4. Basal cell carcinoma of skin of left upper limb, including shoulder  5. BPH with obstruction/lower urinary tract symptoms  6. Hyperlipidemia, unspecified hyperlipidemia type  -     Atorvastatin Calcium; Take 1 tablet (10 mg total) by mouth daily.  Dispense: 90 tablet; Refill: 0            The patient indicates understanding of these issues and agrees to the plan.  Reinforced healthy diet, lifestyle, and exercise.      No follow-ups on file.     Wendie Wolf MD, 4/2/2024     Supplementary Documentation:   General Health:  In the past six months, have you lost more than 10 pounds without trying?: 2 - No  Has your appetite been poor?: No  Type of Diet: Balanced  How does the patient maintain a good energy level?: Appropriate Exercise  How would you describe your daily physical activity?: Light  How would you describe your current health state?: Good  How do you maintain positive mental well-being?: Social Interaction;Puzzles;Games;Visiting Friends;Visiting Family  On a scale of 0 to 10, with 0 being no pain and 10 being severe pain, what is your pain level?: 0 - (None)  In the past six months, have you experienced urine leakage?: 0-No  At any time do you feel concerned for the safety/well-being of yourself and/or your children, in your home or elsewhere?: No  Have you had any immunizations at another office such as Influenza, Hepatitis B, Tetanus, or Pneumococcal?: No        Flash New's SCREENING SCHEDULE   Tests on this list are recommended by your physician but may not be covered, or covered at this frequency, by your insurer.   Please check with your insurance carrier before scheduling to verify coverage.   PREVENTATIVE SERVICES FREQUENCY  &  COVERAGE DETAILS LAST COMPLETION DATE   Diabetes Screening    Fasting Blood Sugar / Glucose    One screening every 12 months if never tested or if previously tested but not diagnosed with pre-diabetes   One screening every 6 months if diagnosed with pre-diabetes Lab Results   Component Value Date    GLU 85 03/29/2024        Cardiovascular Disease Screening    Lipid Panel  Cholesterol  Lipoprotein (HDL)  Triglycerides Covered every 5 years for all Medicare beneficiaries without apparent signs or symptoms of cardiovascular disease Lab Results   Component Value Date    CHOLEST 170 03/29/2024    HDL 55 03/29/2024    LDL 96 03/29/2024    TRIG 106 03/29/2024         Electrocardiogram (EKG)   Covered if needed at Welcome to Medicare, and non-screening if indicated for medical reasons 12/28/2022      Ultrasound Screening for Abdominal Aortic Aneurysm (AAA) Covered once in a lifetime for one of the following risk factors    Men who are 65-75 years old and have ever smoked    Anyone with a family history 01/17/2018     Colorectal Cancer Screening  Covered for ages 50-85; only need ONE of the following:    Colonoscopy   Covered every 10 years    Covered every 2 years if patient is at high risk or previous colonoscopy was abnormal 03/15/2022    Health Maintenance   Topic Date Due    Colorectal Cancer Screening  03/15/2027       Flexible Sigmoidoscopy   Covered every 4 years -    Fecal Occult Blood Test Covered annually -   Prostate Cancer Screening    Prostate-Specific Antigen (PSA) Annually Lab Results   Component Value Date    PSA 2.7 01/16/2019     Health Maintenance   Topic Date Due    PSA  04/13/2025      Immunizations    Influenza Covered once per flu season  Please get every year 10/16/2023  No recommendations at this time    Pneumococcal Each vaccine ([id number] & [id number]) covered once after 65 Prevnar 13: 05/03/2017    Rgtfaxazv52: 07/18/2018     No recommendations at this time    Hepatitis B One screening  covered for patients with certain risk factors   -  No recommendations at this time    Tetanus Toxoid Not covered by Medicare Part B unless medically necessary (cut with metal); may be covered with your pharmacy prescription benefits -    Tetanus, Diptheria and Pertusis TD and TDaP Not covered by Medicare Part B -  No recommendations at this time    Zoster Not covered by Medicare Part B; may be covered with your pharmacy  prescription benefits 08/01/2012  No recommendations at this time     Annual Monitoring of Persistent Medications (ACE/ARB, digoxin diuretics, anticonvulsants)    Potassium Annually Lab Results   Component Value Date    K 3.7 03/29/2024         Creatinine   Annually Lab Results   Component Value Date    CREATSERUM 1.01 03/29/2024         BUN Annually Lab Results   Component Value Date    BUN 18 03/29/2024       Drug Serum Conc Annually No results found for: \"DIGOXIN\", \"DIG\", \"VALP\"               79.4

## 2024-11-19 ENCOUNTER — OFFICE VISIT (OUTPATIENT)
Dept: PODIATRY CLINIC | Facility: CLINIC | Age: 74
End: 2024-11-19
Payer: MEDICARE

## 2024-11-19 DIAGNOSIS — M79.674 PAIN DUE TO ONYCHOMYCOSIS OF TOENAILS OF BOTH FEET: ICD-10-CM

## 2024-11-19 DIAGNOSIS — M79.675 PAIN DUE TO ONYCHOMYCOSIS OF TOENAILS OF BOTH FEET: ICD-10-CM

## 2024-11-19 DIAGNOSIS — L60.3 ONYCHODYSTROPHY: Primary | ICD-10-CM

## 2024-11-19 DIAGNOSIS — B35.1 PAIN DUE TO ONYCHOMYCOSIS OF TOENAILS OF BOTH FEET: ICD-10-CM

## 2024-11-19 DIAGNOSIS — I73.9 PVD (PERIPHERAL VASCULAR DISEASE) (HCC): ICD-10-CM

## 2024-11-19 PROCEDURE — 11721 DEBRIDE NAIL 6 OR MORE: CPT | Performed by: PODIATRIST

## 2024-11-19 PROCEDURE — 99213 OFFICE O/P EST LOW 20 MIN: CPT | Performed by: PODIATRIST

## 2024-11-19 NOTE — PROGRESS NOTES
Reason for Visit      Flash New is a 74 year old male presents today complaining of bilateral routine footcare.     History of Present Illness     Patient presents to clinic today after last being seen by podiatry on 8/13/2024 for routine footcare.  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  Patient presents to clinic today complaining of elongated, thickened toenails that he is unable to debride himself.    The following portions of the patient's history were reviewed and updated as appropriate: allergies, current medications, past family history, past medical history, past social history, past surgical history and problem list.    Allergies[1]      Current Outpatient Medications:     LISINOPRIL 40 MG Oral Tab, Take 1 tablet by mouth daily., Disp: 90 tablet, Rfl: 0    Fluocinonide 0.05 % External Solution, Apply 0.05 % topically as needed., Disp: , Rfl:     atorvastatin (LIPITOR) 10 MG Oral Tab, Take 1 tablet (10 mg total) by mouth daily., Disp: 90 tablet, Rfl: 3    meclizine 25 MG Oral Tab, Take 1 tablet (25 mg total) by mouth 3 (three) times daily as needed., Disp: 30 tablet, Rfl: 0    cephalexin 500 MG Oral Cap, Take 1 capsule (500 mg total) by mouth 4 (four) times daily., Disp: 28 capsule, Rfl: 0    amLODIPine 5 MG Oral Tab, Take 1 tablet (5 mg total) by mouth daily., Disp: 90 tablet, Rfl: 3    finasteride 5 MG Oral Tab, Take 1 tablet (5 mg total) by mouth daily., Disp: 90 tablet, Rfl: 3    celecoxib 200 MG Oral Cap, , Disp: , Rfl:     amoxicillin 500 MG Oral Cap, Take two capsules one hour prior to dental work; take one capsule six hours later. (Patient not taking: Reported on 9/9/2024), Disp: 6 capsule, Rfl: 2    Cholecalciferol (D3 SUPER STRENGTH) 50 MCG (2000 UT) Oral Cap, , Disp: , Rfl:     triamcinolone 0.1 % External Ointment, Apply topically. As needed, Disp: , Rfl:     Cinnamon  500 MG Oral Cap, , Disp: , Rfl:     Magnesium Gluconate (MAGNESIUM 27) 500 (27 Mg) MG Oral Tab, Take twice daily , Disp: , Rfl:     Vitamins-Lipotropics (BALANCED B-100 COMPLEX CR OR), , Disp: , Rfl:     Omega-3 Fatty Acids (FISH OIL) 500 MG Oral Cap, Take by mouth daily.  , Disp: , Rfl:     There are no discontinued medications.    Patient Active Problem List   Diagnosis    BPH with obstruction/lower urinary tract symptoms    Essential hypertension    Degenerative disc disease, lumbar    Basal cell carcinoma of skin of left upper limb, including shoulder    Seborrheic dermatitis    Chronic right-sided low back pain with right-sided sciatica    Avascular necrosis of bone of right hip (HCC)    History of total hip arthroplasty, right    Calf swelling       Past Medical History:    Back problem    Stenosis    Benign prostatic hypertrophy    Degenerative joint disease (DJD) of lumbar spine    Hearing impairment    Hearing aides    High blood pressure    Neuropathy of both feet    Osteoarthritis    Skin cancer of nose    Visual impairment    Glasses       Past Surgical History:   Procedure Laterality Date    Appendectomy      Biopsy of skin lesion  11/2016    Biopsy of skin lesion  1995    mid back skin cyst    Colonoscopy  2003    Colonoscopy N/A 03/15/2022    Procedure: COLONOSCOPY;  Surgeon: Irwin Doyle MD;  Location: [facility] ENDOSCOPY    Colonoscopy & polypectomy  10/28/2016    Contact laser surgery of prostate  04/03/2018    cysto, urethral dilt    Hip surgery Right 01/18/2023    right total hip arthroplasty    Tonsillectomy  1960    Total hip replacement Right 01/18/2023    Right total hip arthroplasty       Family History   Problem Relation Age of Onset    Hypertension Father     Hypertension Mother     Cancer Mother         breast    Hypertension Brother     Other (Pre-Diabetic) Brother        Social History     Occupational History    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    Drug use: Never    Sexual activity: Not on file       ROS      Constitutional: negative for chills, fevers and sweats  Gastrointestinal: negative for abdominal pain, diarrhea, nausea and vomiting  Genitourinary:negative for dysuria and hematuria  Musculoskeletal:negative for arthralgias and muscle weakness  Neurological: negative for paresthesia and weakness  All others reviewed and negative.      Physical Exam     LE PHYSICAL EXAM    Constitution: Well-developed and well-nourished. Gait appears normal. No apparent distress. Alert and oriented to person, place, and time.  Integument: There are no varicosities. Skin appears moist, warm, and supple with positive hair growth. There are no color changes. No open lesions. No macerations, No Hyperkeratotic lesions.  Vascular examination: Dorsalis pedis and posterior tibial pulses are strong bilaterally with capillary filling time less than 3 seconds to all digits. There is no peripheral edema..  Neurological Sensorium: Grossly intact to sharp/dull. Vibratory: Intact.  Musculoskeletal:   5/5 pedal muscle strength b/l     Advanced trophic changes as: hair growth decrease nail changes  (thickening) pigmentary changes (discoloration) skin texture (thin, shiny)     Procedure       Nails 1 through 5 bilateral debrided with use of a nail nipper.  Patient Toller procedure well had no complications.  Neurovascular status intact to the level of the digits post procedure.     Assessment and Plan      Encounter Diagnoses   Name Primary?    Onychodystrophy Yes    Pain due to onychomycosis of toenails of both feet     PVD (peripheral vascular disease) ([facility])        Evaluated the patient. Discussed treatment options with the patient.  Discussed with patient proper care and hygiene for their feet.  Patient tolerated procedures well, without incident.    Instrumentation used includes nail nippers and electric  where appropriate.  Procedure: (49099 Debridement of toenails 6-10) Surgically debrided and mechanically reduced 6 or more toenails.Hemmorhage occurred none.Nails that were debrided appeared dystrophic and caused the patient pain in shoe gear.Nails 5 Left & 5 Right.     Evaluated patient. Discussed treatment options with patient.  Discussed proper hygiene and care for feet as well as use of emollient creams (ie Urea based creams)  Answered all patient questions. Discussed offloading hyperkeratotic lesions with proper shoe gear, offloading pads, and insoles.     Patient was instructed to call the office or on-call podiatric physician immediately with any issues or concerns before the next scheduled visit. Patient to follow-up in clinic in 3 months      Vicky Minor DPM, [email or web address].[email or web address] LAUREANO  Diplomat, American Board of Foot and Ankle Surgery  Certified in Foot and Rearfoot/Ankle Reconstruction  Fellow of the American College of Foot and Ankle Surgeons  Fellowship Trained Foot and Ankle Surgeon   [facility]     11/19/2024    9:55 AM         [1]   Allergies  Allergen Reactions    Merthiolate Glycerite [Thimerosal] RASH    Shellfish-Derived Products SWELLING

## 2024-12-04 DIAGNOSIS — I10 ESSENTIAL HYPERTENSION: ICD-10-CM

## 2024-12-04 RX ORDER — LISINOPRIL 40 MG/1
40 TABLET ORAL DAILY
Qty: 90 TABLET | Refills: 0 | Status: SHIPPED | OUTPATIENT
Start: 2024-12-04

## 2025-02-06 PROBLEM — S52.502A CLOSED FRACTURE DISTAL RADIUS AND ULNA, LEFT, INITIAL ENCOUNTER: Status: ACTIVE | Noted: 2025-02-06

## 2025-02-06 PROBLEM — S52.602A CLOSED FRACTURE DISTAL RADIUS AND ULNA, LEFT, INITIAL ENCOUNTER: Status: ACTIVE | Noted: 2025-02-06

## 2025-02-06 PROBLEM — M25.532 LEFT WRIST PAIN: Status: ACTIVE | Noted: 2025-02-06

## 2025-02-06 PROBLEM — S60.222A CONTUSION OF DORSUM OF LEFT HAND: Status: ACTIVE | Noted: 2025-02-06

## 2025-02-13 ENCOUNTER — HOSPITAL ENCOUNTER (OUTPATIENT)
Dept: ULTRASOUND IMAGING | Facility: HOSPITAL | Age: 75
Discharge: HOME | End: 2025-02-13
Attending: ORTHOPAEDIC SURGERY
Payer: MEDICARE

## 2025-02-13 DIAGNOSIS — M79.89 CALF SWELLING: ICD-10-CM

## 2025-02-13 PROBLEM — S99.912A INJURY OF LEFT ANKLE: Status: ACTIVE | Noted: 2025-02-13

## 2025-02-13 PROCEDURE — 93971 EXTREMITY STUDY: CPT | Performed by: ORTHOPAEDIC SURGERY

## 2025-02-18 PROBLEM — S52.502D CLOSED FRACTURE OF LOWER END OF LEFT RADIUS WITH ROUTINE HEALING: Status: ACTIVE | Noted: 2025-02-06

## 2025-02-18 PROBLEM — S52.602D CLOSED FRACTURE OF LOWER END OF LEFT ULNA WITH ROUTINE HEALING: Status: ACTIVE | Noted: 2025-02-18

## 2025-03-01 DIAGNOSIS — I10 ESSENTIAL HYPERTENSION: ICD-10-CM

## 2025-03-03 RX ORDER — LISINOPRIL 40 MG/1
40 TABLET ORAL DAILY
Qty: 90 TABLET | Refills: 0 | Status: SHIPPED | OUTPATIENT
Start: 2025-03-03

## 2025-03-04 ENCOUNTER — TELEPHONE (OUTPATIENT)
Dept: INTERNAL MEDICINE CLINIC | Facility: CLINIC | Age: 75
End: 2025-03-04

## 2025-03-04 DIAGNOSIS — Z12.5 ENCOUNTER FOR SCREENING FOR MALIGNANT NEOPLASM OF PROSTATE: ICD-10-CM

## 2025-03-04 DIAGNOSIS — Z00.00 PHYSICAL EXAM: Primary | ICD-10-CM

## 2025-03-04 NOTE — TELEPHONE ENCOUNTER
Patient has scheduled an annual physical for 04/17/2025. Patient would like orders put in the system to get blood work prior to his physical.

## 2025-03-17 ENCOUNTER — OFFICE VISIT (OUTPATIENT)
Dept: PODIATRY CLINIC | Facility: CLINIC | Age: 75
End: 2025-03-17
Payer: MEDICARE

## 2025-03-17 DIAGNOSIS — M79.675 PAIN DUE TO ONYCHOMYCOSIS OF TOENAILS OF BOTH FEET: ICD-10-CM

## 2025-03-17 DIAGNOSIS — L60.3 ONYCHODYSTROPHY: Primary | ICD-10-CM

## 2025-03-17 DIAGNOSIS — B35.1 PAIN DUE TO ONYCHOMYCOSIS OF TOENAILS OF BOTH FEET: ICD-10-CM

## 2025-03-17 DIAGNOSIS — M79.674 PAIN DUE TO ONYCHOMYCOSIS OF TOENAILS OF BOTH FEET: ICD-10-CM

## 2025-03-17 DIAGNOSIS — I73.9 PVD (PERIPHERAL VASCULAR DISEASE): ICD-10-CM

## 2025-03-17 NOTE — PROGRESS NOTES
Reason for Visit      Flash New is a 75 year old male presents today complaining of bilateral routine footcare.     History of Present Illness     Patient presents to clinic today after last being seen by podiatry on 8/13/2024 for routine footcare.  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  Patient presents to clinic today complaining of elongated, thickened toenails that he is unable to debride himself.    Patient returns to clinic today after last being seen by me on 11/19/2024.    The following portions of the patient's history were reviewed and updated as appropriate: allergies, current medications, past family history, past medical history, past social history, past surgical history and problem list.    Allergies[1]      Current Outpatient Medications:     LISINOPRIL 40 MG Oral Tab, TAKE ONE TABLET BY MOUTH ONE TIME DAILY, Disp: 90 tablet, Rfl: 0    Fluocinonide 0.05 % External Solution, Apply 0.05 % topically as needed., Disp: , Rfl:     atorvastatin (LIPITOR) 10 MG Oral Tab, Take 1 tablet (10 mg total) by mouth daily., Disp: 90 tablet, Rfl: 3    amLODIPine 5 MG Oral Tab, Take 1 tablet (5 mg total) by mouth daily., Disp: 90 tablet, Rfl: 3    finasteride 5 MG Oral Tab, Take 1 tablet (5 mg total) by mouth daily., Disp: 90 tablet, Rfl: 3    amoxicillin 500 MG Oral Cap, Take two capsules one hour prior to dental work; take one capsule six hours later. (Patient not taking: Reported on 3/4/2025), Disp: 6 capsule, Rfl: 2    Cholecalciferol (D3 SUPER STRENGTH) 50 MCG (2000 UT) Oral Cap, , Disp: , Rfl:     triamcinolone 0.1 % External Ointment, Apply topically. As needed, Disp: , Rfl:     Cinnamon 500 MG Oral Cap, , Disp: , Rfl:     Magnesium Gluconate (MAGNESIUM 27) 500 (27 Mg) MG Oral Tab, Take twice daily , Disp: , Rfl:     Vitamins-Lipotropics (BALANCED B-100 COMPLEX CR OR), , Disp: , Rfl:      Omega-3 Fatty Acids (FISH OIL) 500 MG Oral Cap, Take by mouth daily.  , Disp: , Rfl:     There are no discontinued medications.    Patient Active Problem List   Diagnosis    BPH with obstruction/lower urinary tract symptoms    Essential hypertension    Degenerative disc disease, lumbar    Basal cell carcinoma of skin of left upper limb, including shoulder    Seborrheic dermatitis    Chronic right-sided low back pain with right-sided sciatica    Avascular necrosis of bone of right hip (HCC)    History of total hip arthroplasty, right    Calf swelling    Left wrist pain    Contusion of dorsum of left hand    Closed fracture of lower end of left radius with routine healing    Injury of left ankle    Closed fracture of lower end of left ulna with routine healing       Past Medical History:    Back problem    Stenosis    Benign prostatic hypertrophy    Degenerative joint disease (DJD) of lumbar spine    Hearing impairment    Hearing aides    High blood pressure    Neuropathy of both feet    Osteoarthritis    Skin cancer of nose    Visual impairment    Glasses       Past Surgical History:   Procedure Laterality Date    Appendectomy      Biopsy of skin lesion  11/2016    Biopsy of skin lesion  1995    mid back skin cyst    Colonoscopy  2003    Colonoscopy N/A 03/15/2022    Procedure: COLONOSCOPY;  Surgeon: Irwin Doyle MD;  Location: [facility] ENDOSCOPY    Colonoscopy & polypectomy  10/28/2016    Contact laser surgery of prostate  04/03/2018    cysto, urethral dilt    Hip surgery Right 01/18/2023    right total hip arthroplasty    Tonsillectomy  1960    Total hip replacement Right 01/18/2023    Right total hip arthroplasty       Family History   Problem Relation Age of Onset    Hypertension Father     Hypertension Mother     Cancer Mother         breast    Hypertension Brother     Other (Pre-Diabetic) Brother        Social History     Occupational History    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    Drug use: Never    Sexual activity: Not on file       ROS      Constitutional: negative for chills, fevers and sweats  Gastrointestinal: negative for abdominal pain, diarrhea, nausea and vomiting  Genitourinary:negative for dysuria and hematuria  Musculoskeletal:negative for arthralgias and muscle weakness  Neurological: negative for paresthesia and weakness  All others reviewed and negative.      Physical Exam     LE PHYSICAL EXAM    Constitution: Well-developed and well-nourished. Gait appears normal. No apparent distress. Alert and oriented to person, place, and time.  Integument: There are no varicosities. Skin appears moist, warm, and supple with positive hair growth. There are no color changes. No open lesions. No macerations, No Hyperkeratotic lesions.  Vascular examination: Dorsalis pedis and posterior tibial pulses are strong bilaterally with capillary filling time less than 3 seconds to all digits. There is no peripheral edema..  Neurological Sensorium: Grossly intact to sharp/dull. Vibratory: Intact.  Musculoskeletal:   5/5 pedal muscle strength b/l     Advanced trophic changes as: hair growth decrease nail changes  (thickening) pigmentary changes (discoloration) skin texture (thin, shiny)     Procedure       Nails 1 through 5 bilateral debrided with use of a nail nipper.  Patient Toller procedure well had no complications.  Neurovascular status intact to the level of the digits post procedure.     Assessment and  Plan     Encounter Diagnoses   Name Primary?    Onychodystrophy Yes    Pain due to onychomycosis of toenails of both feet     PVD (peripheral vascular disease)          Evaluated the patient. Discussed treatment options with the patient.  Discussed with patient proper care and hygiene for their feet.  Patient tolerated procedures well, without incident.    Instrumentation used includes nail nippers and electric  where appropriate.  Procedure: (35591 Debridement of toenails 6-10) Surgically debrided and mechanically reduced 6 or more toenails.Hemmorhage occurred none.Nails that were debrided appeared dystrophic and caused the patient pain in shoe gear.Nails 5 Left & 5 Right.     Evaluated patient. Discussed treatment options with patient.  Discussed proper hygiene and care for feet as well as use of emollient creams (ie Urea based creams)  Answered all patient questions. Discussed offloading hyperkeratotic lesions with proper shoe gear, offloading pads, and insoles.     Patient was instructed to call the office or on-call podiatric physician immediately with any issues or concerns before the next scheduled visit. Patient to follow-up in clinic in 3 months      Vicky Minor DPM, [email or web address] LAUREANO  Diplomat, American Board of Foot and Ankle Surgery  Certified in Foot and Rearfoot/Ankle Reconstruction  Fellow of the American College of Foot and Ankle Surgeons  Fellowship Trained Foot and Ankle Surgeon   [facility]     11/19/2024    9:55 AM         [1]   Allergies  Allergen Reactions    Merthiolate Glycerite [Thimerosal] RASH    Shellfish-Derived Products SWELLING

## 2025-04-14 ENCOUNTER — LAB ENCOUNTER (OUTPATIENT)
Dept: LAB | Facility: HOSPITAL | Age: 75
End: 2025-04-14
Attending: UROLOGY
Payer: MEDICARE

## 2025-04-14 DIAGNOSIS — N13.9 OBSTRUCTIVE UROPATHY: ICD-10-CM

## 2025-04-14 DIAGNOSIS — Z12.5 ENCOUNTER FOR SCREENING PROSTATE SPECIFIC ANTIGEN (PSA) MEASUREMENT: Primary | ICD-10-CM

## 2025-04-14 DIAGNOSIS — Z00.00 PHYSICAL EXAM: ICD-10-CM

## 2025-04-14 LAB
ALBUMIN SERPL-MCNC: 4.3 G/DL (ref 3.2–4.8)
ALBUMIN/GLOB SERPL: 1.6 {RATIO} (ref 1–2)
ALP LIVER SERPL-CCNC: 96 U/L (ref 45–117)
ALT SERPL-CCNC: 15 U/L (ref 10–49)
ANION GAP SERPL CALC-SCNC: 4 MMOL/L (ref 0–18)
AST SERPL-CCNC: 22 U/L (ref ?–34)
BASOPHILS # BLD AUTO: 0.04 X10(3) UL (ref 0–0.2)
BASOPHILS NFR BLD AUTO: 0.6 %
BILIRUB SERPL-MCNC: 0.9 MG/DL (ref 0.2–1.1)
BUN BLD-MCNC: 14 MG/DL (ref 9–23)
BUN/CREAT SERPL: 14.3 (ref 10–20)
CALCIUM BLD-MCNC: 9.1 MG/DL (ref 8.7–10.4)
CHLORIDE SERPL-SCNC: 104 MMOL/L (ref 98–112)
CHOLEST SERPL-MCNC: 124 MG/DL (ref ?–200)
CO2 SERPL-SCNC: 30 MMOL/L (ref 21–32)
CREAT BLD-MCNC: 0.98 MG/DL (ref 0.7–1.3)
DEPRECATED RDW RBC AUTO: 43.4 FL (ref 35.1–46.3)
EGFRCR SERPLBLD CKD-EPI 2021: 80 ML/MIN/1.73M2 (ref 60–?)
EOSINOPHIL # BLD AUTO: 0.27 X10(3) UL (ref 0–0.7)
EOSINOPHIL NFR BLD AUTO: 3.9 %
ERYTHROCYTE [DISTWIDTH] IN BLOOD BY AUTOMATED COUNT: 12.6 % (ref 11–15)
FASTING PATIENT LIPID ANSWER: YES
FASTING STATUS PATIENT QL REPORTED: YES
GLOBULIN PLAS-MCNC: 2.7 G/DL (ref 2–3.5)
GLUCOSE BLD-MCNC: 92 MG/DL (ref 70–99)
HCT VFR BLD AUTO: 46.9 % (ref 39–53)
HDLC SERPL-MCNC: 56 MG/DL (ref 40–59)
HGB BLD-MCNC: 15.6 G/DL (ref 13–17.5)
IMM GRANULOCYTES # BLD AUTO: 0.03 X10(3) UL (ref 0–1)
IMM GRANULOCYTES NFR BLD: 0.4 %
LDLC SERPL CALC-MCNC: 50 MG/DL (ref ?–100)
LYMPHOCYTES # BLD AUTO: 1.78 X10(3) UL (ref 1–4)
LYMPHOCYTES NFR BLD AUTO: 25.6 %
MCH RBC QN AUTO: 31.3 PG (ref 26–34)
MCHC RBC AUTO-ENTMCNC: 33.3 G/DL (ref 31–37)
MCV RBC AUTO: 94 FL (ref 80–100)
MONOCYTES # BLD AUTO: 0.48 X10(3) UL (ref 0.1–1)
MONOCYTES NFR BLD AUTO: 6.9 %
NEUTROPHILS # BLD AUTO: 4.34 X10 (3) UL (ref 1.5–7.7)
NEUTROPHILS # BLD AUTO: 4.34 X10(3) UL (ref 1.5–7.7)
NEUTROPHILS NFR BLD AUTO: 62.6 %
NONHDLC SERPL-MCNC: 68 MG/DL (ref ?–130)
OSMOLALITY SERPL CALC.SUM OF ELEC: 286 MOSM/KG (ref 275–295)
PLATELET # BLD AUTO: 235 10(3)UL (ref 150–450)
POTASSIUM SERPL-SCNC: 3.9 MMOL/L (ref 3.5–5.1)
PROT SERPL-MCNC: 7 G/DL (ref 5.7–8.2)
RBC # BLD AUTO: 4.99 X10(6)UL (ref 3.8–5.8)
SODIUM SERPL-SCNC: 138 MMOL/L (ref 136–145)
TRIGL SERPL-MCNC: 99 MG/DL (ref 30–149)
VLDLC SERPL CALC-MCNC: 14 MG/DL (ref 0–30)
WBC # BLD AUTO: 6.9 X10(3) UL (ref 4–11)

## 2025-04-14 PROCEDURE — 80053 COMPREHEN METABOLIC PANEL: CPT

## 2025-04-14 PROCEDURE — 36415 COLL VENOUS BLD VENIPUNCTURE: CPT

## 2025-04-14 PROCEDURE — 80061 LIPID PANEL: CPT

## 2025-04-14 PROCEDURE — 85025 COMPLETE CBC W/AUTO DIFF WBC: CPT

## 2025-04-16 PROBLEM — S99.912A INJURY OF LEFT ANKLE: Status: RESOLVED | Noted: 2025-02-13 | Resolved: 2025-04-16

## 2025-04-16 PROBLEM — M87.051 AVASCULAR NECROSIS OF BONE OF RIGHT HIP (HCC): Status: RESOLVED | Noted: 2022-11-10 | Resolved: 2025-04-16

## 2025-04-16 PROBLEM — M25.532 LEFT WRIST PAIN: Status: RESOLVED | Noted: 2025-02-06 | Resolved: 2025-04-16

## 2025-04-16 PROBLEM — M79.89 CALF SWELLING: Status: RESOLVED | Noted: 2023-02-06 | Resolved: 2025-04-16

## 2025-04-16 PROBLEM — S60.222A CONTUSION OF DORSUM OF LEFT HAND: Status: RESOLVED | Noted: 2025-02-06 | Resolved: 2025-04-16

## 2025-04-16 NOTE — PROGRESS NOTES
Subjective:   Flash New is a 75 year old male who presents for a Subsequent Annual Wellness visit (Pt already had Initial Annual Wellness) and scheduled follow up of multiple significant but stable problems.   History of Present Illness              Fell in feb and broke his left wrist  Completing physical therapy  Usually exercises, will resume soon     HTN-   On amlodipine 5 mg and lisinopril 40 mg      Sees urology for BPH Dr Libia DUNCAN with dermatology- hx BCC- Dr Medina cliic      Feels well  No CP or SOB     Has hearing aids   Sees eye doctor on regular basis      Mood  good     Grandkids [facility]     Patient Active Problem List    Diagnosis Date Noted    Closed fracture of lower end of left ulna with routine healing 02/18/2025    Closed fracture of lower end of left radius with routine healing 02/06/2025    History of total hip arthroplasty, right 02/06/2023    Chronic right-sided low back pain with right-sided sciatica 06/02/2022    Basal cell carcinoma of skin of left upper limb, including shoulder 01/27/2022    Seborrheic dermatitis 01/27/2022    Degenerative disc disease, lumbar 04/23/2020    Essential hypertension 07/18/2018    BPH with obstruction/lower urinary tract symptoms       History/Other:   Fall Risk Assessment:   He has been screened for Falls and is High Risk. Fall Prevention information provided to patient in After Visit Summary.    Do you feel unsteady when standing or walking?: (Patient-Rptd) No  Do you worry about falling?: (Patient-Rptd) No  Have you fallen in the past year?: (Patient-Rptd) Yes  How many times have you fallen?: (Patient-Rptd) (P) 1  Were you injured?: (Patient-Rptd) (P) Yes     Cognitive Assessment:   He had a completely normal cognitive assessment - see flowsheet entries       Functional Ability/Status:   Flash New has some abnormal functions as listed below:  He has Hearing problems based on screening of functional status.He has Vision  problems based on screening of functional status.       Depression Screening (PHQ):  PHQ-2 SCORE: 0  , done 4/17/2025             Advanced Directives:   He does have a Living Will but we do NOT have it on file in Epic.    He does have a POA but we do NOT have it on file in Epic.    Discussed Advance Care Planning with patient (and family/surrogate if present). Standard forms made available to patient in After Visit Summary.      Problem List[1]  Allergies:  He is allergic to merthiolate glycerite [thimerosal] and shellfish-derived products.    Current Medications:  Active Meds, Sig Only[2]    Medical History:  He  has a past medical history of Back problem, Benign prostatic hypertrophy, Degenerative joint disease (DJD) of lumbar spine, Hearing impairment, High blood pressure, Neuropathy of both feet, Osteoarthritis, Skin cancer of nose (11/2016), and Visual impairment.  Surgical History:  He  has a past surgical history that includes tonsillectomy (1960); appendectomy; colonoscopy (2003); colonoscopy & polypectomy (10/28/2016); biopsy of skin lesion (11/2016); contact laser surgery of prostate (04/03/2018); biopsy of skin lesion (1995); colonoscopy (N/A, 03/15/2022); total hip replacement (Right, 01/18/2023); and hip surgery (Right, 01/18/2023).   Family History:  His family history includes Cancer in his mother; Hypertension in his brother, father, and mother; Pre-Diabetic in his brother.  Social History:  He  reports that he quit smoking about 45 years ago. His smoking use included cigarettes. He started smoking about 55 years ago. He has a 10 pack-year smoking history. He has never used smokeless tobacco. He reports current alcohol use. He reports that he does not use drugs.    Tobacco:  He smoked tobacco in the past but quit greater than 12 months ago.  Tobacco Use[3]     CAGE Alcohol Screen:   CAGE screening score of 0 on 4/10/2025, showing low risk of alcohol abuse.      Patient Care Team:  Wendie Wolf,  MD as PCP - General (Family Medicine)  Mike Youssef MD (Anesthesiology)  Demetri Laird MD (Anesthesiology)  Griffin Philippe MD (Anesthesiology)  JOYCE GRANGER as PCP  Chaim Taylor MD (Radiation Oncology)    Review of Systems         GENERAL: feels well , no fevers, no weight changes  SKIN: denies any unusual skin lesions  EYES: denies blurred vision or double vision  LUNGS: denies shortness of breath with exertion  CARDIOVASCULAR: denies chest pain on exertion  GI: no abd pain, diarrhea or constipation   : denies dysuria  MUSCULOSKELETAL: denies muscle pain  NEURO: denies headaches,  ENDOCRINE: no hx of DM or thyroid problems      Objective:   Physical Exam     /66   Pulse 77   Ht 5' 10\" (1.778 m)   Wt 165 lb 12.8 oz (75.2 kg)   SpO2 98%   BMI 23.79 kg/m²  Estimated body mass index is 23.79 kg/m² as calculated from the following:    Height as of this encounter: 5' 10\" (1.778 m).    Weight as of this encounter: 165 lb 12.8 oz (75.2 kg).    Medicare Hearing Assessment:   Hearing Screening    Screening Method: Questionnaire  I have a problem hearing over the telephone: No I have trouble following the conversations when two or more people are talking at the same time: No   I have trouble understanding things on the TV: No I have to strain to understand conversations: No   I have to worry about missing the telephone ring or doorbell: No I have trouble hearing conversations in a noisy background such as a crowded room or restaurant: No   I get confused about where sounds come from: No I misunderstand some words in a sentence and need to ask people to repeat themselves: No   I especially have trouble understanding the speech of women and children: No I have trouble understanding the speaker in a large room such as at a meeting or place of Taoist: No   Many people I talk to seem to mumble (or don't speak clearly): No People get annoyed because I misunderstand what they say: No   I  misunderstand what others are saying and make inappropriate responses: No I avoid social activities because I cannot hear well and fear I will reply improperly: No   Family members and friends have told me they think I may have hearing loss: No             Visual Acuity:   Right Eye Visual Acuity: Uncorrected Right Eye Chart Acuity: 20/30   Left Eye Visual Acuity: Uncorrected Left Eye Chart Acuity: 20/30   Both Eyes Visual Acuity: Uncorrected Both Eyes Chart Acuity: 20/30   Able To Tolerate Visual Acuity: Yes        /66   Pulse 77   Ht 5' 10\" (1.778 m)   Wt 165 lb 12.8 oz (75.2 kg)   SpO2 98%   BMI 23.79 kg/m²   Body mass index is 23.79 kg/m².   GENERAL: well developed, well nourished,in no apparent distress  SKIN: no rashes,no suspicious lesions  HEENT: atraumatic, normocephalic, O/P normal  Ears- normal   EYES:PERRLA,  nl conjunctiva   NECK: supple,no adenopathy   LUNGS: clear to auscultation  CARDIO: RRR without murmur  GI: good BS's,no masses, HSM or tenderness  EXTREMITIES: no cyanosis, clubbing or edema  NEURO: Oriented times three,cranial nerves are intact,motor and sensory are grossly intact    Assessment & Plan:   Flash New is a 75 year old male who presents for a Medicare Assessment.     1. Physical exam (Primary)  2. Essential hypertension  -     Discontinue: Lisinopril; Take 1 tablet (40 mg total) by mouth daily.  Dispense: 90 tablet; Refill: 0  -     amLODIPine Besylate; Take 1 tablet (5 mg total) by mouth daily.  Dispense: 90 tablet; Refill: 3  -     Lisinopril; Take 1 tablet (40 mg total) by mouth daily.  Dispense: 90 tablet; Refill: 3  3. Hyperlipidemia, unspecified hyperlipidemia type  -     Atorvastatin Calcium; Take 1 tablet (10 mg total) by mouth daily.  Dispense: 90 tablet; Refill: 3  4. Basal cell carcinoma of skin of left upper limb, including shoulder  -     Derm Referral - In Network  [unfilled]              1. Physical exam  Reviewed labs  Recommend regular  exercise     2. Essential hypertension  Controlled, CPM  - amLODIPine 5 MG Oral Tab; Take 1 tablet (5 mg total) by mouth daily.  Dispense: 90 tablet; Refill: 3  - lisinopril 40 MG Oral Tab; Take 1 tablet (40 mg total) by mouth daily.  Dispense: 90 tablet; Refill: 3    3. Hyperlipidemia, unspecified hyperlipidemia type  Controlled CPM   - atorvastatin (LIPITOR) 10 MG Oral Tab; Take 1 tablet (10 mg total) by mouth daily.  Dispense: 90 tablet; Refill: 3    4. Basal cell carcinoma of skin of left upper limb, including shoulder  F/u with dermatology   - Derm Referral - In Network      The patient indicates understanding of these issues and agrees to the plan.  Reinforced healthy diet, lifestyle, and exercise.      No follow-ups on file.     Wendie Wolf MD, 4/16/2025     Supplementary Documentation:   General Health:  In the past six months, have you lost more than 10 pounds without trying?: (Patient-Rptd) 2 - No  Has your appetite been poor?: (Patient-Rptd) No  Type of Diet: (Patient-Rptd) Balanced  How does the patient maintain a good energy level?: (Patient-Rptd) Appropriate Exercise, Stretching, Other  How would you describe your daily physical activity?: (Patient-Rptd) Light  How would you describe your current health state?: (Patient-Rptd) Good  How do you maintain positive mental well-being?: (Patient-Rptd) Social Interaction, Puzzles, Visiting Friends, Visiting Family  On a scale of 0 to 10, with 0 being no pain and 10 being severe pain, what is your pain level?: (Patient-Rptd) 0 - (None)  In the past six months, have you experienced urine leakage?: (Patient-Rptd) 0-No  At any time do you feel concerned for the safety/well-being of yourself and/or your children, in your home or elsewhere?: (Patient-Rptd) No  Have you had any immunizations at another office such as Influenza, Hepatitis B, Tetanus, or Pneumococcal?: (Patient-Rptd) Yes    Health Maintenance   Topic Date Due    Annual Depression Screening   01/01/2025    Annual Physical  04/03/2025    COVID-19 Vaccine (9 - 2024-25 season) 04/15/2025    PSA  04/16/2026    Colorectal Cancer Screening  03/15/2027    Influenza Vaccine  Completed    Fall Risk Screening (Annual)  Completed    Pneumococcal Vaccine: 50+ Years  Completed    Zoster Vaccines  Completed    Meningococcal B Vaccine  Aged Out            [1]   Patient Active Problem List  Diagnosis    BPH with obstruction/lower urinary tract symptoms    Essential hypertension    Degenerative disc disease, lumbar    Basal cell carcinoma of skin of left upper limb, including shoulder    Seborrheic dermatitis    Chronic right-sided low back pain with right-sided sciatica    History of total hip arthroplasty, right    Closed fracture of lower end of left radius with routine healing    Closed fracture of lower end of left ulna with routine healing   [2]   Outpatient Medications Marked as Taking for the 4/17/25 encounter (Office Visit) with Wendie Wolf MD   Medication Sig    atorvastatin (LIPITOR) 10 MG Oral Tab Take 1 tablet (10 mg total) by mouth daily.    amLODIPine 5 MG Oral Tab Take 1 tablet (5 mg total) by mouth daily.    lisinopril 40 MG Oral Tab Take 1 tablet (40 mg total) by mouth daily.    finasteride 5 MG Oral Tab Take 1 tablet (5 mg total) by mouth daily.    Fluocinonide 0.05 % External Solution Apply 0.05 % topically as needed.    amoxicillin 500 MG Oral Cap Take two capsules one hour prior to dental work; take one capsule six hours later.    Cholecalciferol (D3 SUPER STRENGTH) 50 MCG (2000 UT) Oral Cap     triamcinolone 0.1 % External Ointment Apply topically. As needed    Cinnamon 500 MG Oral Cap     Magnesium Gluconate (MAGNESIUM 27) 500 (27 Mg) MG Oral Tab Take twice daily     Vitamins-Lipotropics (BALANCED B-100 COMPLEX CR OR)     Omega-3 Fatty Acids (FISH OIL) 500 MG Oral Cap Take by mouth daily.     [3]   Social History  Tobacco Use   Smoking Status Former    Current packs/day: 0.00    Average  packs/day: 1 pack/day for 10.0 years (10.0 ttl pk-yrs)    Types: Cigarettes    Start date: 3/27/1970    Quit date: 3/27/1980    Years since quittin.0   Smokeless Tobacco Never

## 2025-04-17 ENCOUNTER — OFFICE VISIT (OUTPATIENT)
Dept: INTERNAL MEDICINE CLINIC | Facility: CLINIC | Age: 75
End: 2025-04-17
Payer: MEDICARE

## 2025-04-17 VITALS
OXYGEN SATURATION: 98 % | WEIGHT: 165.81 LBS | BODY MASS INDEX: 23.74 KG/M2 | SYSTOLIC BLOOD PRESSURE: 120 MMHG | HEIGHT: 70 IN | HEART RATE: 77 BPM | DIASTOLIC BLOOD PRESSURE: 66 MMHG

## 2025-04-17 DIAGNOSIS — C44.619 BASAL CELL CARCINOMA OF SKIN OF LEFT UPPER LIMB, INCLUDING SHOULDER: ICD-10-CM

## 2025-04-17 DIAGNOSIS — E78.5 HYPERLIPIDEMIA, UNSPECIFIED HYPERLIPIDEMIA TYPE: ICD-10-CM

## 2025-04-17 DIAGNOSIS — I10 ESSENTIAL HYPERTENSION: ICD-10-CM

## 2025-04-17 DIAGNOSIS — Z00.00 PHYSICAL EXAM: Primary | ICD-10-CM

## 2025-04-17 RX ORDER — ATORVASTATIN CALCIUM 10 MG/1
10 TABLET, FILM COATED ORAL DAILY
Qty: 90 TABLET | Refills: 3 | Status: SHIPPED | OUTPATIENT
Start: 2025-04-17 | End: 2026-04-12

## 2025-04-17 RX ORDER — LISINOPRIL 40 MG/1
40 TABLET ORAL DAILY
Qty: 90 TABLET | Refills: 0 | Status: SHIPPED | OUTPATIENT
Start: 2025-04-17 | End: 2025-04-17

## 2025-04-17 RX ORDER — LISINOPRIL 40 MG/1
40 TABLET ORAL DAILY
Qty: 90 TABLET | Refills: 3 | Status: SHIPPED | OUTPATIENT
Start: 2025-04-17

## 2025-04-17 RX ORDER — AMLODIPINE BESYLATE 5 MG/1
5 TABLET ORAL DAILY
Qty: 90 TABLET | Refills: 3 | Status: SHIPPED | OUTPATIENT
Start: 2025-04-17

## 2025-04-17 RX ORDER — AMLODIPINE BESYLATE 5 MG/1
5 TABLET ORAL DAILY
COMMUNITY
Start: 2022-06-01 | End: 2025-04-17

## 2025-04-22 ENCOUNTER — LAB ENCOUNTER (OUTPATIENT)
Dept: LAB | Facility: HOSPITAL | Age: 75
End: 2025-04-22
Attending: UROLOGY
Payer: MEDICARE

## 2025-04-22 DIAGNOSIS — Z12.5 ENCOUNTER FOR SCREENING PROSTATE SPECIFIC ANTIGEN (PSA) MEASUREMENT: ICD-10-CM

## 2025-04-22 DIAGNOSIS — N13.9 OBSTRUCTIVE UROPATHY: ICD-10-CM

## 2025-04-22 LAB
COMPLEXED PSA SERPL-MCNC: 2.64 NG/ML (ref ?–4)
CREAT BLD-MCNC: 0.9 MG/DL (ref 0.7–1.3)
EGFRCR SERPLBLD CKD-EPI 2021: 89 ML/MIN/1.73M2 (ref 60–?)

## 2025-04-22 PROCEDURE — 82565 ASSAY OF CREATININE: CPT

## 2025-04-22 PROCEDURE — 36415 COLL VENOUS BLD VENIPUNCTURE: CPT

## 2025-05-15 ENCOUNTER — LAB ENCOUNTER (OUTPATIENT)
Dept: LAB | Facility: HOSPITAL | Age: 75
End: 2025-05-15
Attending: UROLOGY
Payer: MEDICARE

## 2025-05-15 DIAGNOSIS — R97.20 ELEVATED PSA: Primary | ICD-10-CM

## 2025-05-15 PROCEDURE — 84154 ASSAY OF PSA FREE: CPT

## 2025-05-15 PROCEDURE — 84153 ASSAY OF PSA TOTAL: CPT

## 2025-05-15 PROCEDURE — 36415 COLL VENOUS BLD VENIPUNCTURE: CPT

## 2025-05-16 LAB
% FREE PSA: 25.3 %
% FREE PSA: 25.3 %
PROSTATE SPECIFIC AG: 4.7 NG/ML
PROSTATE SPECIFIC AG: 4.7 NG/ML
PSA, FREE: 1.19 NG/ML
PSA, FREE: 1.19 NG/ML

## 2025-05-18 ENCOUNTER — PATIENT MESSAGE (OUTPATIENT)
Dept: INTERNAL MEDICINE CLINIC | Facility: CLINIC | Age: 75
End: 2025-05-18

## 2025-05-21 NOTE — TELEPHONE ENCOUNTER
Per Dr Wolf-- SDA appt booked for pt for 5/29. [employer] message sent to pt to confirm this slot works for him and to call office back if time/ date not workable for him.

## 2025-06-02 ENCOUNTER — LAB REQUISITION (OUTPATIENT)
Dept: LAB | Facility: HOSPITAL | Age: 75
End: 2025-06-02
Payer: MEDICARE

## 2025-06-02 DIAGNOSIS — R97.20 ELEVATED PROSTATE SPECIFIC ANTIGEN (PSA): ICD-10-CM

## 2025-06-02 PROCEDURE — 88305 TISSUE EXAM BY PATHOLOGIST: CPT | Performed by: UROLOGY

## 2025-06-02 PROCEDURE — 88342 IMHCHEM/IMCYTCHM 1ST ANTB: CPT | Performed by: UROLOGY

## 2025-06-06 ENCOUNTER — PATIENT MESSAGE (OUTPATIENT)
Dept: INTERNAL MEDICINE CLINIC | Facility: CLINIC | Age: 75
End: 2025-06-06

## 2025-06-12 ENCOUNTER — HOSPITAL ENCOUNTER (OUTPATIENT)
Dept: NUCLEAR MEDICINE | Facility: HOSPITAL | Age: 75
Discharge: HOME | End: 2025-06-12
Attending: UROLOGY
Payer: MEDICARE

## 2025-06-12 DIAGNOSIS — C61 PROSTATE CANCER (HCC): ICD-10-CM

## 2025-06-12 PROCEDURE — 78815 PET IMAGE W/CT SKULL-THIGH: CPT | Performed by: UROLOGY

## 2025-06-16 ENCOUNTER — OFFICE VISIT (OUTPATIENT)
Dept: PODIATRY CLINIC | Facility: CLINIC | Age: 75
End: 2025-06-16
Payer: MEDICARE

## 2025-06-16 DIAGNOSIS — I73.9 PVD (PERIPHERAL VASCULAR DISEASE): ICD-10-CM

## 2025-06-16 DIAGNOSIS — M79.675 PAIN DUE TO ONYCHOMYCOSIS OF TOENAILS OF BOTH FEET: Primary | ICD-10-CM

## 2025-06-16 DIAGNOSIS — B35.1 PAIN DUE TO ONYCHOMYCOSIS OF TOENAILS OF BOTH FEET: Primary | ICD-10-CM

## 2025-06-16 DIAGNOSIS — M79.674 PAIN DUE TO ONYCHOMYCOSIS OF TOENAILS OF BOTH FEET: Primary | ICD-10-CM

## 2025-06-16 NOTE — PROGRESS NOTES
Reason for Visit      Flash New is a 75 year old male presents today complaining of bilateral routine footcare.     History of Present Illness     Patient presents to clinic today after last being seen by podiatry on 8/13/2024 for routine footcare.  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  Patient presents to clinic today complaining of elongated, thickened toenails that he is unable to debride himself.    Patient returns to clinic today after last being seen by me on 3/17/2025    The following portions of the patient's history were reviewed and updated as appropriate: allergies, current medications, past family history, past medical history, past social history, past surgical history and problem list.    Allergies[1]      Current Outpatient Medications:     ondansetron (ZOFRAN) 4 mg tablet, Take 1 tablet (4 mg total) by mouth every 8 (eight) hours as needed for Nausea., Disp: 10 tablet, Rfl: 0    ALPRAZolam (XANAX) 0.25 MG Oral Tab, Take 1 tablet (0.25 mg total) by mouth 4 (four) times daily as needed., Disp: 10 tablet, Rfl: 0    atorvastatin (LIPITOR) 10 MG Oral Tab, Take 1 tablet (10 mg total) by mouth daily., Disp: 90 tablet, Rfl: 3    amLODIPine 5 MG Oral Tab, Take 1 tablet (5 mg total) by mouth daily., Disp: 90 tablet, Rfl: 3    lisinopril 40 MG Oral Tab, Take 1 tablet (40 mg total) by mouth daily., Disp: 90 tablet, Rfl: 3    finasteride 5 MG Oral Tab, Take 1 tablet (5 mg total) by mouth daily., Disp: , Rfl:     Fluocinonide 0.05 % External Solution, Apply 0.05 % topically as needed., Disp: , Rfl:     amoxicillin 500 MG Oral Cap, Take two capsules one hour prior to dental work; take one capsule six hours later., Disp: 6 capsule, Rfl: 2    Cholecalciferol (D3 SUPER STRENGTH) 50 MCG (2000 UT) Oral Cap, , Disp: , Rfl:     triamcinolone 0.1 % External Ointment, Apply topically. As needed,  Disp: , Rfl:     Cinnamon 500 MG Oral Cap, , Disp: , Rfl:     Magnesium Gluconate (MAGNESIUM 27) 500 (27 Mg) MG Oral Tab, Take twice daily , Disp: , Rfl:     Vitamins-Lipotropics (BALANCED B-100 COMPLEX CR OR), , Disp: , Rfl:     Omega-3 Fatty Acids (FISH OIL) 500 MG Oral Cap, Take by mouth daily.  , Disp: , Rfl:     There are no discontinued medications.    Patient Active Problem List   Diagnosis    BPH with obstruction/lower urinary tract symptoms    Essential hypertension    Degenerative disc disease, lumbar    Basal cell carcinoma of skin of left upper limb, including shoulder    Seborrheic dermatitis    Chronic right-sided low back pain with right-sided sciatica    History of total hip arthroplasty, right    Closed fracture of lower end of left radius with routine healing    Closed fracture of lower end of left ulna with routine healing       Past Medical History:    Back problem    Stenosis    Benign prostatic hypertrophy    Degenerative joint disease (DJD) of lumbar spine    Hearing impairment    Hearing aides    High blood pressure    Neuropathy of both feet    Osteoarthritis    Skin cancer of nose    Visual impairment    Glasses       Past Surgical History:   Procedure Laterality Date    Appendectomy      Biopsy of skin lesion  11/2016    Biopsy of skin lesion  1995    mid back skin cyst    Colonoscopy  2003    Colonoscopy N/A 03/15/2022    Procedure: COLONOSCOPY;  Surgeon: Irwin Doyle MD;  Location: [facility] ENDOSCOPY    Colonoscopy & polypectomy  10/28/2016    Contact laser surgery of prostate  04/03/2018    cysto, urethral dilt    Hip surgery Right 01/18/2023    right total hip arthroplasty    Tonsillectomy  1960    Total hip replacement Right 01/18/2023    Right total hip arthroplasty       Family History   Problem Relation Age of Onset    Hypertension Father     Hypertension Mother     Cancer Mother         breast    Hypertension Brother     Other (Pre-Diabetic) Brother        Social History      Occupational History    Occupation: [employer]     Comment: retired    Occupation:      Employer: [employer]     Comment: retired   Tobacco Use    Smoking status: Former     Current packs/day: 0.00     Average packs/day: 1 pack/day for 10.0 years (10.0 ttl pk-yrs)     Types: Cigarettes     Start date: 3/27/1970     Quit date: 3/27/1980     Years since quittin.2    Smokeless tobacco: Never   Vaping Use    Vaping status: Never Used   Substance and Sexual Activity    Alcohol use: Yes     Comment: Per NG: Drinks beer and wine, 4 -5 drinks weekly.    Drug use: Never    Sexual activity: Not on file       ROS      Constitutional: negative for chills, fevers and sweats  Gastrointestinal: negative for abdominal pain, diarrhea, nausea and vomiting  Genitourinary:negative for dysuria and hematuria  Musculoskeletal:negative for arthralgias and muscle weakness  Neurological: negative for paresthesia and weakness  All others reviewed and negative.      Physical Exam     LE PHYSICAL EXAM    Constitution: Well-developed and well-nourished. Gait appears normal. No apparent distress. Alert and oriented to person, place, and time.  Integument: There are no varicosities. Skin appears moist, warm, and supple with positive hair growth. There are no color changes. No open lesions. No macerations, No Hyperkeratotic lesions.  Vascular examination: Dorsalis pedis and posterior tibial pulses are strong bilaterally with capillary filling time less than 3 seconds to all digits. There is no peripheral edema..  Neurological Sensorium: Grossly intact to sharp/dull. Vibratory: Intact.  Musculoskeletal:   5/5 pedal muscle strength b/l     Advanced trophic changes as: hair growth decrease nail changes  (thickening) pigmentary changes (discoloration) skin texture (thin, shiny)     Procedure       Nails 1 through 5 bilateral debrided with use of a nail nipper.  Patient Toller procedure well had no complications.   Neurovascular status intact to the level of the digits post procedure.     Assessment and Plan     Encounter Diagnoses   Name Primary?    Pain due to onychomycosis of toenails of both feet Yes    PVD (peripheral vascular disease)            Evaluated the patient. Discussed treatment options with the patient.  Discussed with patient proper care and hygiene for their feet.  Patient tolerated procedures well, without incident.    Instrumentation used includes nail nippers and electric  where appropriate.  Procedure: (27215 Debridement of toenails 6-10) Surgically debrided and mechanically reduced 6 or more toenails.Hemmorhage occurred none.Nails that were debrided appeared dystrophic and caused the patient pain in shoe gear.Nails 5 Left & 5 Right.     Evaluated patient. Discussed treatment options with patient.  Discussed proper hygiene and care for feet as well as use of emollient creams (ie Urea based creams)  Answered all patient questions. Discussed offloading hyperkeratotic lesions with proper shoe gear, offloading pads, and insoles.     Patient was instructed to call the office or on-call podiatric physician immediately with any issues or concerns before the next scheduled visit. Patient to follow-up in clinic in 3 months      Vicky Minor DPM, [email or web address] LAUREANO  Diplomat, American Board of Foot and Ankle Surgery  Certified in Foot and Rearfoot/Ankle Reconstruction  Fellow of the American College of Foot and Ankle Surgeons  Fellowship Trained Foot and Ankle Surgeon   [facility]     11/19/2024    9:55 AM         [1]   Allergies  Allergen Reactions    Merthiolate Glycerite [Thimerosal] RASH    Shellfish-Derived Products SWELLING

## 2025-06-17 ENCOUNTER — OFFICE VISIT (OUTPATIENT)
Dept: OTOLARYNGOLOGY | Facility: CLINIC | Age: 75
End: 2025-06-17
Payer: MEDICARE

## 2025-06-17 ENCOUNTER — TELEPHONE (OUTPATIENT)
Age: 75
End: 2025-06-17

## 2025-06-17 VITALS — WEIGHT: 162 LBS | HEIGHT: 70 IN | BODY MASS INDEX: 23.19 KG/M2

## 2025-06-17 DIAGNOSIS — H61.23 BILATERAL IMPACTED CERUMEN: Primary | ICD-10-CM

## 2025-06-17 PROCEDURE — 69210 REMOVE IMPACTED EAR WAX UNI: CPT | Performed by: SPECIALIST

## 2025-06-17 RX ORDER — BICALUTAMIDE 50 MG/1
TABLET, FILM COATED ORAL
COMMUNITY
Start: 2025-06-16

## 2025-06-17 RX ORDER — TAMSULOSIN HYDROCHLORIDE 0.4 MG/1
CAPSULE ORAL
COMMUNITY
Start: 2025-06-16

## 2025-06-17 NOTE — TELEPHONE ENCOUNTER
Writer called patient and scheduled consult appointment with Dr. Mccracken for 6/25. Patient aware of clinic location.

## 2025-06-17 NOTE — TELEPHONE ENCOUNTER
Pt is calling to schedule a new consult appt.  Patient Name- Flash New  Referred by- Dr. Bi Minor  Referred to- Dr. Mccracken  Reason- Prostate Cancer  Insurance- Medicare  Please give pt a call back. Thank you.

## 2025-06-25 ENCOUNTER — OFFICE VISIT (OUTPATIENT)
Age: 75
End: 2025-06-25
Attending: STUDENT IN AN ORGANIZED HEALTH CARE EDUCATION/TRAINING PROGRAM
Payer: MEDICARE

## 2025-06-25 VITALS
WEIGHT: 160 LBS | HEIGHT: 70 IN | RESPIRATION RATE: 18 BRPM | TEMPERATURE: 98 F | SYSTOLIC BLOOD PRESSURE: 122 MMHG | BODY MASS INDEX: 22.9 KG/M2 | DIASTOLIC BLOOD PRESSURE: 68 MMHG | HEART RATE: 66 BPM | OXYGEN SATURATION: 98 %

## 2025-06-25 DIAGNOSIS — Z51.11 CHEMOTHERAPY MANAGEMENT, ENCOUNTER FOR: ICD-10-CM

## 2025-06-25 DIAGNOSIS — Z79.818 USE OF LEUPROLIDE ACETATE (LUPRON): ICD-10-CM

## 2025-06-25 DIAGNOSIS — C61 PROSTATE CANCER (HCC): Primary | ICD-10-CM

## 2025-06-26 NOTE — CONSULTS
[facility] Hematology Oncology  Initial Oncology Clinic Consult Note    Patient Name: Flash New   YOB: 1950   Medical Record Number: [id number]  Referring Physician(s): Bi Minor    Subjective:   Flash New is a 75 year old male with a history of HTN and BPH who presents to render an opinion from medical oncology regarding at least high risk Héctor (5+5) = 10 prostate cancer. PSA was elevated which prompted a biopsy and revealed Varnell (5+5)=10 prostate cancer, though not primary GG5 patter, PSMA PET/CT negative for yaima or distant disease and he was referred to medical oncology to discuss hormonal therapy. He has been evaluated for brachytherapy boost and will see Rad Onc later this week to discuss EBRT.     Clinically he feels well, he has no acute concerns or complaints.     Review of Systems:  Pt denies fevers, chills, night sweats, HA, vision changes, CP, SOB, cough, n/v/d, abd pain, urinary or bowel complaints  Hematology/Oncology ROS performed and negative except as above in HPI    History/Other:   Past Medical History:  Past Medical History[1]    Past Surgical History:  Past Surgical History[2]    Current Medications:  Current Medications[3]    Allergies:   Allergies[4]    Family Medical History:  Family History[5]    Social History:  Social History     Socioeconomic History    Marital status:      Spouse name: Not on file    Number of children: 2    Years of education: Not on file    Highest education level: Not on file   Occupational History    Occupation: [employer]     Comment: retired    Occupation:      Employer: [employer]     Comment: retired   Tobacco Use    Smoking status: Former     Current packs/day: 0.00     Average packs/day: 1 pack/day for 10.0 years (10.0 ttl pk-yrs)     Types: Cigarettes     Start date: 3/27/1970     Quit date: 3/27/1980     Years since quittin.2    Smokeless tobacco: Never   Vaping Use     Vaping status: Never Used   Substance and Sexual Activity    Alcohol use: Yes     Comment: Per NG: Drinks beer and wine, 4 -5 drinks weekly.    Drug use: Never    Sexual activity: Not on file   Other Topics Concern     Service Not Asked    Blood Transfusions Not Asked    Caffeine Concern Yes     Comment: Per NG: Coffee, 3 cups daily    Occupational Exposure Not Asked    Hobby Hazards Not Asked    Sleep Concern Not Asked    Stress Concern Not Asked    Weight Concern Not Asked    Special Diet Not Asked    Back Care Not Asked    Exercise Not Asked    Bike Helmet Not Asked    Seat Belt Not Asked    Self-Exams Not Asked   Social History Narrative    Not on file     Social Drivers of Health     Food Insecurity: Not on file   Transportation Needs: Not on file   Housing Stability: Not on file       Objective:   Blood pressure 122/68, pulse 66, temperature 97.8 °F (36.6 °C), temperature source Tympanic, resp. rate 18, height 1.778 m (5' 10\"), weight 72.6 kg (160 lb), SpO2 98%.  Physical Exam:  ECOG PS: 0  General: A&Ox3, NAD  HEENT: PERRL, OP clear  CV: RRR, no murmurs  Pulm: CTA b/l, normal effort  Abd: soft, ntnd  Extremities: no edema  Neurological: grossly intact    Results:   Labs:  Lab Results   Component Value Date    WBC 6.9 04/14/2025    HGB 15.6 04/14/2025    HCT 46.9 04/14/2025    .0 04/14/2025    CREATSERUM 0.90 04/22/2025    BUN 14 04/14/2025     04/14/2025    K 3.9 04/14/2025     04/14/2025    CO2 30.0 04/14/2025    GLU 92 04/14/2025    CA 9.1 04/14/2025    ALB 4.3 04/14/2025    ALKPHO 96 04/14/2025    BILT 0.9 04/14/2025    TP 7.0 04/14/2025    AST 22 04/14/2025    ALT 15 04/14/2025    PTT 26.1 12/28/2022    INR 0.97 12/28/2022    TSH 2.77 05/03/2017    PSA 4.7 (H) 05/15/2025    MG 1.8 04/08/2018    TROP 0.01 04/05/2018    B12 >1,500 (H) 05/03/2017     Imaging:  PSMA PET/CT 6/12/25:  CONCLUSION:   Prostate is enlarged. Nodular PET avid focus within the right peripheral zone  compatible with recently biopsied tumor.  There is also PET avid focus within the posterior margin of the right side of the prostate which could represent nodular focus within    the gland or localized involvement with right seminal vesicle, which can otherwise be better characterized with prostate MRI.     Pathology, prostate biopsy 6/2/25:                Location Héctor  Score % of  Pattern 4  Grade Group   Positive Cores / Total Cores  Tumor Length (mm)  % Tissue Involved       A. Left lateral mid - - - - - -    B. Left lateral base 4+4=8 100 4 1/1 1.8 15    C. Left apex - - - - - -    D. Left mid - - - - - -    E. Left base - - - - - -    F. Left transitional zone - - - - - -    G. Right lateral mid 4+5=9 55 5 1/1 17.8 99    H. Right lateral base - - - - - -    I. Right apex - - - - - -    J. Right mid 5+4=9 20 5 1/1 10.8 60    K. Right base 5+5=10 - 5 1/2 2.1 10    L. Right transitional zone - - - - - -     Assessment & Plan:   Flash New is a 75 year old male with a history of HTN and BPH who presents to render an opinion from medical oncology regarding at least high risk Bedrock (5+5) = 10 clinically localized prostate cancer.     We reviewed the diagnosis and natural history of prostate cancer and standard of care treatment options.  We discussed EBRT and ADT for 24 to 36 months as standard of care treatment.  He has at least high risk disease with grade group 5 adenocarcinoma on biopsy, low he does not have primary Bedrock grade group 5 pattern.  We discussed indications for the addition of abiraterone to ADT based on STAMPEDE, and he currently remains unclear if he meets criteria based on local T staging.  I agree with an MRI prostate which will be obtained at [place] in the next week or 2.  If there is at least T3 disease then we can strongly consider the addition of abiraterone to ADT.  Is being considered for EBRT and brachytherapy boost, Dr. Minor plans to administer Lupron.  I will touch  base with the patient over the phone pending MRI results and if there is the suspected seminal vesicle involvement bring him back to discuss the addition of abiraterone more extensively.    MILADIS Mccracken,     [facility] Hematology/Oncology  [facility]     This note was created using a voice-recognition transcribing system. Incorrect words or phrases may have been missed during proofreading. Please interpret accordingly.         [1]   Past Medical History:   Back problem    Stenosis    Benign prostatic hypertrophy    Degenerative joint disease (DJD) of lumbar spine    Hearing impairment    Hearing aides    High blood pressure    Neuropathy of both feet    Osteoarthritis    Skin cancer of nose    Visual impairment    Glasses   [2]   Past Surgical History:  Procedure Laterality Date    Appendectomy      Biopsy of skin lesion  11/2016    Biopsy of skin lesion  1995    mid back skin cyst    Colonoscopy  2003    Colonoscopy N/A 03/15/2022    Procedure: COLONOSCOPY;  Surgeon: Irwin Doyle MD;  Location: [facility] ENDOSCOPY    Colonoscopy & polypectomy  10/28/2016    Contact laser surgery of prostate  04/03/2018    cysto, urethral dilt    Hip surgery Right 01/18/2023    right total hip arthroplasty    Tonsillectomy  1960    Total hip replacement Right 01/18/2023    Right total hip arthroplasty   [3]    bicalutamide 50 MG Oral Tab       tamsulosin 0.4 MG Oral Cap       ondansetron (ZOFRAN) 4 mg tablet Take 1 tablet (4 mg total) by mouth every 8 (eight) hours as needed for Nausea. 10 tablet 0    ALPRAZolam (XANAX) 0.25 MG Oral Tab Take 1 tablet (0.25 mg total) by mouth 4 (four) times daily as needed. 10 tablet 0    atorvastatin (LIPITOR) 10 MG Oral Tab Take 1 tablet (10 mg total) by mouth daily. 90 tablet 3    amLODIPine 5 MG Oral Tab Take 1 tablet (5 mg total) by mouth daily. 90 tablet 3    lisinopril 40 MG Oral Tab Take 1 tablet (40 mg total) by mouth daily. 90 tablet 3     finasteride 5 MG Oral Tab Take 1 tablet (5 mg total) by mouth in the morning.      Fluocinonide 0.05 % External Solution Apply 0.05 % topically as needed.      amoxicillin 500 MG Oral Cap Take two capsules one hour prior to dental work; take one capsule six hours later. 6 capsule 2    Cholecalciferol (D3 SUPER STRENGTH) 50 MCG (2000 UT) Oral Cap       triamcinolone 0.1 % External Ointment Apply topically. As needed      Cinnamon 500 MG Oral Cap       Magnesium Gluconate (MAGNESIUM 27) 500 (27 Mg) MG Oral Tab Take twice daily      Vitamins-Lipotropics (BALANCED B-100 COMPLEX CR OR)       Omega-3 Fatty Acids (FISH OIL) 500 MG Oral Cap Take by mouth in the morning.     [4]   Allergies  Allergen Reactions    Merthiolate Glycerite [Thimerosal] RASH    Shellfish-Derived Products SWELLING   [5]   Family History  Problem Relation Age of Onset    Hypertension Father     Hypertension Mother     Cancer Mother         breast    Hypertension Brother     Other (Pre-Diabetic) Brother

## 2025-06-26 NOTE — PROGRESS NOTES
Nursing Consultation Note  Patient: Flash New  YOB: 1950  Age: 75 year old  Radiation Oncologist: Dr. Chaim Taylor  Referring Physician: Bi Minor  Diagnosis:[unfilled]  Consult Date: 6/26/2025      Chemotherapy: N/A  Labs: Reviewed  Imaging: Reviewed  Is the patient of child-bearing age?         No  Has the patient received radiation therapy in the past? yes- heterotropic hip treatment 1/17/2023.  Does the patient have an implantable device?No   Patient has/has had:     1. Assistive Devices: N/A    2. Flu Vaccination: yes    3. Pneumonia Vaccination:  yes    Vital Signs: There were no vitals filed for this visit.,   Wt Readings from Last 6 Encounters:   06/25/25 72.6 kg (160 lb)   06/17/25 73.5 kg (162 lb)   05/29/25 73.5 kg (162 lb)   04/17/25 75.2 kg (165 lb 12.8 oz)   02/13/25 74.8 kg (165 lb)   02/06/25 74.8 kg (165 lb)       Nursing Note:      Review of Systems   Constitutional: Negative.    HENT: Negative.     Eyes: Negative.    Respiratory: Negative.     Cardiovascular: Negative.    Gastrointestinal: Negative.    Endocrine: Negative.    Genitourinary: Negative.    Musculoskeletal:  Positive for back pain.        Lower back pain occasionally     Stiffness- has arthritis        Skin: Negative.    Allergic/Immunologic: Negative.    Neurological: Negative.    Hematological: Negative.    Psychiatric/Behavioral: Negative.            Allergies:  Allergies[1]    Current Medications[2]    Preferred Pharmacy:    [id number] - [address] [phone], [phone]  [address] [place] [id number]  Phone: [phone] Fax: [phone]      Past Medical History[3]    Past Surgical History[4]    Social History     Socioeconomic History    Marital status:      Spouse name: Not on file    Number of children: 2    Years of education: Not on file    Highest education level: Not on file   Occupational History    Occupation: [employer]     Comment: retired     Occupation:      Employer: [employer]     Comment: retired   Tobacco Use    Smoking status: Former     Current packs/day: 0.00     Average packs/day: 1 pack/day for 10.0 years (10.0 ttl pk-yrs)     Types: Cigarettes     Start date: 3/27/1970     Quit date: 3/27/1980     Years since quittin.2    Smokeless tobacco: Never   Vaping Use    Vaping status: Never Used   Substance and Sexual Activity    Alcohol use: Yes     Comment: Per NG: Drinks beer and wine, 4 -5 drinks weekly.    Drug use: Never    Sexual activity: Not on file   Other Topics Concern     Service Not Asked    Blood Transfusions Not Asked    Caffeine Concern Yes     Comment: Per NG: Coffee, 3 cups daily    Occupational Exposure Not Asked    Hobby Hazards Not Asked    Sleep Concern Not Asked    Stress Concern Not Asked    Weight Concern Not Asked    Special Diet Not Asked    Back Care Not Asked    Exercise Not Asked    Bike Helmet Not Asked    Seat Belt Not Asked    Self-Exams Not Asked   Social History Narrative    Not on file     Social Drivers of Health     Food Insecurity: Not on file   Transportation Needs: Not on file   Housing Stability: Not on file       ECOG:  Grade 0 - Fully active, able to carry on all predisease activities without restrictions.      Education:  yes    Are ADL's met?  Yes  Does patient feel safe in their environment?  Yes  Care decisions:  Patient and/or surrogate IS involved in care decisions.  Advanced directives:  Patient HAS advnaced directives and a copy has been requested.  Transportation:  Adequate transportation available for expected visits    Pain:   ;    ;    ;     Primary language:  English  Language line required?  no  Comprehension Ability:  good  Able to read?  yes  Able to write?  yes  Communication tools:  none  Patient's ability to learn:  good  Readiness to learn:  Motivated  Learning preferences:  Discussion and Handout  Barriers to learning:  None  Interventions to reduce barriers:   Consult, Face patient when speaking, Provide support/encouragement, Provide printed materials, and Patient to express feelings  Visual aids:  yes  Hearing disability:  yes- bilateral   Dentures:  no  Knowledge Deficit Plan Of Care:    Problem:  Knowledge Deficit    Problems related to:    Radiation therapy    Interventions:  Assess patient knowledge level  Assess knowledge needs  Instruct on treatment planning  Instruct on radiation therapy appointment scheduling  Instruct on purpose of radiation therapy  Instruct on side effects of radiation therapy    Expected Outcomes:  Knowledge of care plan  Knowledge of radiation therapy  Knowledge of side effects of radiation therapy and management  Comfortable with knowledge level    Progress Toward Outcome:  Making progress    Pamphlets/Handouts Given to Patient:  Understanding radiation therapy  Radiation process overview        Patient seen for consult with Dr. Taylor. Accompanied by his wife. VSS. Patient denies pain currently. Patient had elevated PSA on 4/22/25, repeat on 5/15/25 even higher. Biopsy done on 6/2/25 confirming prostate CA. PSMA PET done on 6/12/25. Patient saw med onc on 6/25/25 and a radiation oncologist at [place] on 6/24/25. AUA 8, SKY 0. Patient has MRI prostate scheduled for 7/9/25. Here to discuss RT. I explained to the patient that today he would meet Dr. Taylor but while being treated there was a possibility that he might also encounter the physicians who cover for Dr. Taylor which are Dr. Bucio, Dr. Maza, and Dr. Surjit Guadalupe. Patient educated on radiation process as well as full bladder instructions. States his understanding. Consent signed, CT sim task not sent. Patient scheduled for HDR CT sim on 8/7, will sim for EBRT 3-7 days after. Patient provided with radiation RN number.          [1]   Allergies  Allergen Reactions    Merthiolate Glycerite [Thimerosal] RASH    Shellfish-Derived Products SWELLING   [2]   Current Outpatient Medications   Medication Sig  Dispense Refill    bicalutamide 50 MG Oral Tab       tamsulosin 0.4 MG Oral Cap       ondansetron (ZOFRAN) 4 mg tablet Take 1 tablet (4 mg total) by mouth every 8 (eight) hours as needed for Nausea. 10 tablet 0    ALPRAZolam (XANAX) 0.25 MG Oral Tab Take 1 tablet (0.25 mg total) by mouth 4 (four) times daily as needed. 10 tablet 0    atorvastatin (LIPITOR) 10 MG Oral Tab Take 1 tablet (10 mg total) by mouth daily. 90 tablet 3    amLODIPine 5 MG Oral Tab Take 1 tablet (5 mg total) by mouth daily. 90 tablet 3    lisinopril 40 MG Oral Tab Take 1 tablet (40 mg total) by mouth daily. 90 tablet 3    finasteride 5 MG Oral Tab Take 1 tablet (5 mg total) by mouth in the morning.      Fluocinonide 0.05 % External Solution Apply 0.05 % topically as needed.      amoxicillin 500 MG Oral Cap Take two capsules one hour prior to dental work; take one capsule six hours later. 6 capsule 2    Cholecalciferol (D3 SUPER STRENGTH) 50 MCG (2000 UT) Oral Cap       triamcinolone 0.1 % External Ointment Apply topically. As needed      Cinnamon 500 MG Oral Cap       Magnesium Gluconate (MAGNESIUM 27) 500 (27 Mg) MG Oral Tab Take twice daily      Vitamins-Lipotropics (BALANCED B-100 COMPLEX CR OR)       Omega-3 Fatty Acids (FISH OIL) 500 MG Oral Cap Take by mouth in the morning.     [3]   Past Medical History:   Back problem    Stenosis    Benign prostatic hypertrophy    Degenerative joint disease (DJD) of lumbar spine    Hearing impairment    Hearing aides    High blood pressure    Neuropathy of both feet    Osteoarthritis    Skin cancer of nose    Visual impairment    Glasses   [4]   Past Surgical History:  Procedure Laterality Date    Appendectomy      Biopsy of skin lesion  11/2016    Biopsy of skin lesion  1995    mid back skin cyst    Colonoscopy  2003    Colonoscopy N/A 03/15/2022    Procedure: COLONOSCOPY;  Surgeon: Irwin Doyle MD;  Location: [facility] ENDOSCOPY    Colonoscopy & polypectomy  10/28/2016    Contact laser surgery  of prostate  04/03/2018    cysto, urethral dilt    Hip surgery Right 01/18/2023    right total hip arthroplasty    Tonsillectomy  1960    Total hip replacement Right 01/18/2023    Right total hip arthroplasty

## 2025-06-27 ENCOUNTER — OFFICE VISIT (OUTPATIENT)
Dept: RADIATION ONCOLOGY | Facility: HOSPITAL | Age: 75
End: 2025-06-27
Attending: RADIOLOGY
Payer: MEDICARE

## 2025-06-27 VITALS
WEIGHT: 160.63 LBS | HEART RATE: 68 BPM | SYSTOLIC BLOOD PRESSURE: 124 MMHG | RESPIRATION RATE: 18 BRPM | DIASTOLIC BLOOD PRESSURE: 70 MMHG | OXYGEN SATURATION: 95 % | TEMPERATURE: 98 F | BODY MASS INDEX: 23 KG/M2

## 2025-06-27 NOTE — CONSULTS
RADIATION ONCOLOGY NOTE    DATE OF VISIT: 6/27/2025    DIAGNOSIS :  High risk prostate cancer, G10, MRI pending, PSA 4.7,  for trimodality with HDR/EBRT.    Dear Bird Rinaldi Wiele, Ross and colleagues,    As you recall, Mr. Flash New is a 75 year old pleasant male, who presents with his wife to discussed the various mgmt options for his localized, very high risk prostate cancer.     Pt presented with elevated PSA and Biopsy revealed Héctor (5+5)=10 prostate cancer as below and met w/u including.  PSMA PET/CT negative for yaima or distant disease and he was referred to medical oncology to discuss hormonal therapy. He has been evaluated for brachytherapy boost as well.  MRI is spending.  Pt previously had XRT to hip for heterotopic bone.  Pt will start Lupron shortly.             Recent Results (from the past [id number] hours)   PSA, Total and Free    Collection Time: 05/15/25  8:23 AM   Result Value Ref Range    Prostate Specific Antigen 4.7 (H) 0.0 - 4.0 ng/mL    Free PSA 1.19 N/A ng/mL    %Free PSA 25.3 %     *Note: Due to a large number of results and/or encounters for the requested time period, some results have not been displayed. A complete set of results can be found in Results Review.     PSA:    Lab Results   Component Value Date    PSA 4.7 (H) 05/15/2025    PSA 2.7 01/16/2019    PSA 3.1 12/20/2017    PSA 2.9 05/15/2017    PSA 2.6 12/08/2016       PSA Screen:    Lab Results   Component Value Date    PSAS 2.64 04/22/2025    PSAS 1.15 04/16/2024    PSAS 1.50 04/13/2023    PSAS 2.91 04/12/2022    PSAS 2.96 01/21/2020    PSAS 2.83 01/16/2019         PET PSMA FOR PROSTATE CARCINOMA (CPT=78815)  Result Date: 6/13/2025  CONCLUSION:  Prostate is enlarged.  Nodular PET avid focus within the right peripheral zone compatible with recently biopsied tumor.  There is also PET avid focus within the posterior margin of the right side of the prostate which could represent nodular focus within  the gland or  localized involvement with right seminal vesicle, which can otherwise be better characterized with prostate MRI.   Dictated by (CST): Dionte Cabezas MD on 6/13/2025 at 4:54 PM     Finalized by (CST): Dionte Cabezas MD on 6/13/2025 at 5:09 PM              ROS    A 12 Point review of system was obtained and is as above and per HPI and nursing note.    Review of Systems   Constitutional: Negative.    HENT: Negative.     Eyes: Negative.    Respiratory: Negative.     Cardiovascular: Negative.    Gastrointestinal: Negative.    Endocrine: Negative.    Genitourinary: Negative.    Musculoskeletal:  Positive for back pain.        Lower back pain occasionally     Stiffness- has arthritis        Skin: Negative.    Allergic/Immunologic: Negative.    Neurological: Negative.    Hematological: Negative.    Psychiatric/Behavioral: Negative.           Current Medications[1]    PAIN:   , Pain Score: 0,     ,  ,     ,  ,      ALLERGIES :   Allergies[2]    PAST MEDICAL HISTORY:   has a past medical history of Back problem, Benign prostatic hypertrophy, Degenerative joint disease (DJD) of lumbar spine, Hearing impairment, High blood pressure, Neuropathy of both feet, Osteoarthritis, Skin cancer of nose (11/2016), and Visual impairment.    He has no past medical history of Anesthesia complication, Anxiety state, Asthma (HCC), Blood disorder, Calculus of kidney, Deep vein thrombosis (HCC), Depression, Diabetes (HCC), Difficult intubation, Disorder of liver, Diverticulosis of large intestine, Esophageal reflux, Family history of malignant hyperthermia, Family history of pseudocholinesterase deficiency, Heart attack (HCC), High cholesterol, History of adverse reaction to anesthesia, History of blood transfusion, motion sickness, Hyperlipidemia, Malignant hyperthermia, Muscle weakness, Pneumonia due to organism, PONV (postoperative nausea and vomiting), Pseudocholinesterase deficiency, Pulmonary embolism (HCC), Renal disorder, Seizure disorder  (HCC), Sleep apnea, Stroke (HCC), or Type 1 diabetes mellitus (HCC).    PAST SURGICAL HISTORY:   has a past surgical history that includes tonsillectomy (1960); appendectomy; colonoscopy (2003); colonoscopy & polypectomy (10/28/2016); biopsy of skin lesion (11/2016); contact laser surgery of prostate (04/03/2018) (cysto, urethral dilt); biopsy of skin lesion (1995) (mid back skin cyst); colonoscopy (N/A, 03/15/2022) (Procedure: COLONOSCOPY;  Surgeon: Irwin Doyle MD;  Location: [facility] ENDOSCOPY); total hip replacement (Right, 01/18/2023) (Right total hip arthroplasty); and hip surgery (Right, 01/18/2023) (right total hip arthroplasty).    PAST SOCIAL HISTORY   reports that he quit smoking about 45 years ago. His smoking use included cigarettes. He started smoking about 55 years ago. He has a 10 pack-year smoking history. He has never used smokeless tobacco. He reports current alcohol use. He reports that he does not use drugs.    PAST FAMILY HISTORY   family history includes Cancer in his mother; Hypertension in his brother, father, and mother; Pre-Diabetic in his brother.    Wt Readings from Last 6 Encounters:   06/27/25 72.8 kg (160 lb 9.6 oz)   06/25/25 72.6 kg (160 lb)   06/17/25 73.5 kg (162 lb)   05/29/25 73.5 kg (162 lb)   04/17/25 75.2 kg (165 lb 12.8 oz)   02/13/25 74.8 kg (165 lb)        PHYSICAL EXAM  Blood pressure 124/70, pulse 68, temperature 97.7 °F (36.5 °C), temperature source Temporal, resp. rate 18, weight 72.8 kg (160 lb 9.6 oz), SpO2 95%.  PERFORMANCE STATUS 0 , denies PAIN  GENERAL:  Pt is alert and oriented times three in no acute distress.    HEENT:  PERRLA, EOMI,   NECK:  Supple with no  lymphadenopathy.   CHEST:  Clear   ABDOMEN:  Soft with no masses.  TAYA deferred until XRT mapping.   EXTREMITIES:  There is no upper or lower extremity edema.    NEUROLOGIC:  Cranial nerves II-XII are intact.  Neuro exam is nonfocal.    COMPLETED TESTS:  I have reviewed the patient's clinical,  radiographic, pathologic and laboratory studies.    ASSESSMENT/PLAN    Very high risk prostate cancer, G10, MRI pending, PSA 4.7,  for trimodality with HDR/EBRT.    PSMA PET/CT negative for yaima or distant disease     I have discussed various options and discussed various forms of XRT and role of ADT.      He has been evaluated for brachytherapy boost as well.  MRI is spending.     Pt will proceed with HDR boost first prior to EBRT and will start Flomax shortly.    I have also discussed the rule of EBRT to the prostate/LN for regional control.    Pt will continue to follow closely with Dr. Mccracken for ARSI discussion after the MRI.     I have explained the diagnosis, stage, natural history of the disease and the goals of treatment.   The rational, alternatives and all risk were discussed and all questions were answered.    At this time the patient would like to proceed with a course of treatment.  Therefore, I will plan a XRT mapping session shortly and will keep you updated.      Thank you for allowing me to take care of your patient.  Please do not hesitate to contact me directly.    Chaim Taylor MD, FACRO  Radiation Oncology  [email or web address]  [phone]    6/27/2025                      [1]   Current Outpatient Medications   Medication Sig Dispense Refill    bicalutamide 50 MG Oral Tab       atorvastatin (LIPITOR) 10 MG Oral Tab Take 1 tablet (10 mg total) by mouth daily. 90 tablet 3    amLODIPine 5 MG Oral Tab Take 1 tablet (5 mg total) by mouth daily. 90 tablet 3    lisinopril 40 MG Oral Tab Take 1 tablet (40 mg total) by mouth daily. 90 tablet 3    finasteride 5 MG Oral Tab Take 1 tablet (5 mg total) by mouth in the morning.      Fluocinonide 0.05 % External Solution Apply 0.05 % topically as needed.      amoxicillin 500 MG Oral Cap Take two capsules one hour prior to dental work; take one capsule six hours later. 6 capsule 2    Cholecalciferol (D3 SUPER STRENGTH) 50 MCG (2000 UT) Oral Cap        triamcinolone 0.1 % External Ointment Apply topically. As needed      Cinnamon 500 MG Oral Cap       Magnesium Gluconate (MAGNESIUM 27) 500 (27 Mg) MG Oral Tab Take twice daily      Vitamins-Lipotropics (BALANCED B-100 COMPLEX CR OR)       Omega-3 Fatty Acids (FISH OIL) 500 MG Oral Cap Take by mouth in the morning.      tamsulosin 0.4 MG Oral Cap  (Patient not taking: Reported on 6/27/2025)      ondansetron (ZOFRAN) 4 mg tablet Take 1 tablet (4 mg total) by mouth every 8 (eight) hours as needed for Nausea. (Patient not taking: Reported on 6/27/2025) 10 tablet 0    ALPRAZolam (XANAX) 0.25 MG Oral Tab Take 1 tablet (0.25 mg total) by mouth 4 (four) times daily as needed. (Patient not taking: Reported on 6/27/2025) 10 tablet 0   [2]   Allergies  Allergen Reactions    Merthiolate Glycerite [Thimerosal] RASH    Shellfish-Derived Products SWELLING

## 2025-06-27 NOTE — PATIENT INSTRUCTIONS
- please call us when your HDR procedure is scheduled and when you have a completion date.     - We schedule your CT mapping scan 3-7 days after you're done with HDR.     - please bring a copy of the MRI disc to our office so Dr. Taylor can see the images.     - please call [phone] with radiation questions

## 2025-07-08 ENCOUNTER — APPOINTMENT (OUTPATIENT)
Dept: RADIATION ONCOLOGY | Facility: HOSPITAL | Age: 75
End: 2025-07-08
Attending: RADIOLOGY
Payer: MEDICARE

## 2025-07-08 PROCEDURE — 77334 RADIATION TREATMENT AID(S): CPT | Performed by: RADIOLOGY

## 2025-07-09 PROCEDURE — 77399 UNLISTED PX MED RADJ PHYSICS: CPT | Performed by: RADIOLOGY

## 2025-07-09 PROCEDURE — 77470 SPECIAL RADIATION TREATMENT: CPT | Performed by: RADIOLOGY

## 2025-07-11 ENCOUNTER — EKG ENCOUNTER (OUTPATIENT)
Dept: LAB | Facility: HOSPITAL | Age: 75
End: 2025-07-11
Attending: FAMILY MEDICINE
Payer: MEDICARE

## 2025-07-11 DIAGNOSIS — Z01.818 PRE-OP EVALUATION: ICD-10-CM

## 2025-07-11 LAB
ATRIAL RATE: 69 BPM
P AXIS: 61 DEGREES
P-R INTERVAL: 178 MS
Q-T INTERVAL: 398 MS
QRS DURATION: 86 MS
QTC CALCULATION (BEZET): 426 MS
R AXIS: 75 DEGREES
T AXIS: 72 DEGREES
VENTRICULAR RATE: 69 BPM

## 2025-07-11 PROCEDURE — 93010 ELECTROCARDIOGRAM REPORT: CPT | Performed by: STUDENT IN AN ORGANIZED HEALTH CARE EDUCATION/TRAINING PROGRAM

## 2025-07-11 PROCEDURE — 93005 ELECTROCARDIOGRAM TRACING: CPT

## 2025-07-14 ENCOUNTER — APPOINTMENT (OUTPATIENT)
Dept: URBAN - METROPOLITAN AREA CLINIC 244 | Age: 75
Setting detail: DERMATOLOGY
End: 2025-07-14

## 2025-07-14 DIAGNOSIS — L57.0 ACTINIC KERATOSIS: ICD-10-CM

## 2025-07-14 DIAGNOSIS — B07.8 OTHER VIRAL WARTS: ICD-10-CM

## 2025-07-14 DIAGNOSIS — Z12.83 ENCOUNTER FOR SCREENING FOR MALIGNANT NEOPLASM OF SKIN: ICD-10-CM

## 2025-07-14 DIAGNOSIS — D22 MELANOCYTIC NEVI: ICD-10-CM

## 2025-07-14 DIAGNOSIS — L82.1 OTHER SEBORRHEIC KERATOSIS: ICD-10-CM

## 2025-07-14 DIAGNOSIS — Z85.828 PERSONAL HISTORY OF OTHER MALIGNANT NEOPLASM OF SKIN: ICD-10-CM

## 2025-07-14 DIAGNOSIS — L20.89 OTHER ATOPIC DERMATITIS: ICD-10-CM

## 2025-07-14 DIAGNOSIS — L81.4 OTHER MELANIN HYPERPIGMENTATION: ICD-10-CM

## 2025-07-14 DIAGNOSIS — L21.8 OTHER SEBORRHEIC DERMATITIS: ICD-10-CM

## 2025-07-14 PROBLEM — D48.5 NEOPLASM OF UNCERTAIN BEHAVIOR OF SKIN: Status: ACTIVE | Noted: 2025-07-14

## 2025-07-14 PROBLEM — D22.9 MELANOCYTIC NEVI, UNSPECIFIED: Status: ACTIVE | Noted: 2025-07-14

## 2025-07-14 PROCEDURE — 11102 TANGNTL BX SKIN SINGLE LES: CPT | Mod: 59

## 2025-07-14 PROCEDURE — OTHER BIOPSY BY SHAVE METHOD: OTHER

## 2025-07-14 PROCEDURE — OTHER COUNSELING: OTHER

## 2025-07-14 PROCEDURE — 17003 DESTRUCT PREMALG LES 2-14: CPT | Mod: 59

## 2025-07-14 PROCEDURE — 17000 DESTRUCT PREMALG LESION: CPT | Mod: 59

## 2025-07-14 PROCEDURE — OTHER LIQUID NITROGEN: OTHER

## 2025-07-14 PROCEDURE — OTHER MIPS QUALITY: OTHER

## 2025-07-14 PROCEDURE — 99214 OFFICE O/P EST MOD 30 MIN: CPT | Mod: 25

## 2025-07-14 PROCEDURE — 17110 DESTRUCT B9 LESION 1-14: CPT

## 2025-07-14 PROCEDURE — OTHER PRESCRIPTION: OTHER

## 2025-07-14 RX ORDER — FLUOCINONIDE 0.5 MG/ML
SOLUTION TOPICAL
Qty: 60 | Refills: 6 | Status: ERX

## 2025-07-14 RX ORDER — TRIAMCINOLONE ACETONIDE 1 MG/G
OINTMENT TOPICAL
Qty: 30 | Refills: 0 | Status: ERX

## 2025-07-14 ASSESSMENT — LOCATION DETAILED DESCRIPTION DERM
LOCATION DETAILED: LEFT NASAL SIDEWALL
LOCATION DETAILED: RIGHT SUPERIOR FRONTAL SCALP
LOCATION DETAILED: NASAL DORSUM
LOCATION DETAILED: SUPERIOR THORACIC SPINE
LOCATION DETAILED: LEFT SUPERIOR FRONTAL SCALP
LOCATION DETAILED: LEFT POSTERIOR SHOULDER
LOCATION DETAILED: MID-FRONTAL SCALP
LOCATION DETAILED: LEFT NASAL SIDEWALL

## 2025-07-14 ASSESSMENT — LOCATION SIMPLE DESCRIPTION DERM
LOCATION SIMPLE: SCALP
LOCATION SIMPLE: ANTERIOR SCALP
LOCATION SIMPLE: LEFT SHOULDER
LOCATION SIMPLE: NOSE
LOCATION SIMPLE: UPPER BACK
LOCATION SIMPLE: LEFT NOSE
LOCATION SIMPLE: LEFT NOSE

## 2025-07-14 ASSESSMENT — LOCATION ZONE DERM
LOCATION ZONE: ARM
LOCATION ZONE: TRUNK
LOCATION ZONE: NOSE
LOCATION ZONE: NOSE
LOCATION ZONE: SCALP

## 2025-07-15 PROCEDURE — 77301 RADIOTHERAPY DOSE PLAN IMRT: CPT | Performed by: RADIOLOGY

## 2025-07-15 PROCEDURE — 77300 RADIATION THERAPY DOSE PLAN: CPT | Performed by: RADIOLOGY

## 2025-07-15 PROCEDURE — 77338 DESIGN MLC DEVICE FOR IMRT: CPT | Performed by: RADIOLOGY

## 2025-07-17 ENCOUNTER — PATIENT MESSAGE (OUTPATIENT)
Dept: INTERNAL MEDICINE CLINIC | Facility: CLINIC | Age: 75
End: 2025-07-17

## 2025-07-17 ENCOUNTER — APPOINTMENT (OUTPATIENT)
Dept: RADIATION ONCOLOGY | Facility: HOSPITAL | Age: 75
End: 2025-07-17
Attending: RADIOLOGY
Payer: MEDICARE

## 2025-07-17 PROCEDURE — 77385 HC IMRT SIMPLE: CPT | Performed by: RADIOLOGY

## 2025-07-18 PROCEDURE — 77385 HC IMRT SIMPLE: CPT | Performed by: RADIOLOGY

## 2025-07-21 ENCOUNTER — VIRTUAL PHONE E/M (OUTPATIENT)
Facility: LOCATION | Age: 75
End: 2025-07-21
Attending: STUDENT IN AN ORGANIZED HEALTH CARE EDUCATION/TRAINING PROGRAM
Payer: MEDICARE

## 2025-07-21 ENCOUNTER — OFFICE VISIT (OUTPATIENT)
Dept: RADIATION ONCOLOGY | Facility: HOSPITAL | Age: 75
End: 2025-07-21
Attending: RADIOLOGY
Payer: MEDICARE

## 2025-07-21 VITALS
TEMPERATURE: 98 F | RESPIRATION RATE: 18 BRPM | WEIGHT: 160.38 LBS | SYSTOLIC BLOOD PRESSURE: 103 MMHG | HEART RATE: 65 BPM | BODY MASS INDEX: 23 KG/M2 | DIASTOLIC BLOOD PRESSURE: 61 MMHG | OXYGEN SATURATION: 97 %

## 2025-07-21 DIAGNOSIS — C61 PROSTATE CANCER (HCC): Primary | ICD-10-CM

## 2025-07-21 DIAGNOSIS — C61 MALIGNANT NEOPLASM OF PROSTATE (HCC): Primary | ICD-10-CM

## 2025-07-21 PROCEDURE — 77385 HC IMRT SIMPLE: CPT | Performed by: RADIOLOGY

## 2025-07-21 NOTE — PROGRESS NOTES
[facility] Radiation Treatment Management Note 1-5    Patient:  Flash New  Age:  75 year old  Visit Diagnosis:    1. Malignant neoplasm of prostate (HCC)      Primary Rad/Onc:  Dr. Chaim Taylor    Site Delivered Dose (cGy) Prescribed Dose (cGy) Fraction #   Prostate +  4500 3/25           First treatment date:  7/17/25  Concurrent chemotherapy:  N/A        6/26/2025     9:44 AM 6/27/2025     7:32 AM 7/21/2025     9:44 AM   Oncology Vitals   Weight  160 lb 9.6 oz 160 lb 6.4 oz   Weight  72.848 kg 72.757 kg   BSA (m2)  1.9 m2 1.9 m2   BMI  23.04 kg/m2 23.02 kg/m2   /68 124/70 103/61   Pulse  68 65   Resp  18 18   Temp  97.7 °F (36.5 °C) 98 °F (36.7 °C)   SpO2  95 % 97 %   Pain Score  0 0        Toxicities:  Fatigue Grade 0= None  Constipation Grade 0= None  Diarrhea  Grade 0= None  Dysuria on urination Grade 0= None   Urgency on urination Grade 0= None  Frequency on urinationGrade 0= None  Urine stream strength Moderate  Urine Incontinence Grade 0= None  Nocturia Grade 1= Present X 2 nightly Baseline    Nursing Note:  OTV with Dr. Rupinder gutierrez. No RT issues  On ADT and flomax daily    Ena LINDER RN    Physician Note:  Subjective:  -new start, no issues  -already on Flomax      Objective:  Vitals noted  ECOG 0  NAD      Treatment setup imaging have been reviewed:  Yes    Assessment/Plan:  -cont EBRT per plan, HDR boost at [facility]    We discussed expected future toxicity. All questions answered.  Next OTV 1 week     Shilpa Bucio MD  For AS

## 2025-07-22 PROCEDURE — 77385 HC IMRT SIMPLE: CPT | Performed by: RADIOLOGY

## 2025-07-22 NOTE — PROGRESS NOTES
MRI negative for lymphadenopathy, gross SANDRA for seminal vesicle involvement, thus pt does not meet criteria for Abiraterone per ROSA M. He will receive ADT through Dr. Minor and receive EBRT/Brachytherapy at our Center and [facility], respectively. I will notify Dr. Minor and remain available should any needs arise in the future.     Griffin Mccracken, DO

## 2025-07-23 ENCOUNTER — DIETICIAN VISIT (OUTPATIENT)
Dept: NUTRITION | Facility: HOSPITAL | Age: 75
End: 2025-07-23

## 2025-07-23 VITALS — WEIGHT: 160.63 LBS | BODY MASS INDEX: 23 KG/M2

## 2025-07-23 PROCEDURE — 77385 HC IMRT SIMPLE: CPT | Performed by: RADIOLOGY

## 2025-07-23 NOTE — PROGRESS NOTES
Oncology Nutrition Assessment    Ht Readings from Last 1 Encounters:   06/25/25 177.8 cm (5' 10\")       Wt Readings from Last 1 Encounters:   07/23/25 72.8 kg (160 lb 9.6 oz)     BMI Calculated:  Body mass index is 23.04 kg/m².  Weight History:    Wt Readings from Last 10 Encounters:   07/23/25 72.8 kg (160 lb 9.6 oz)   07/21/25 72.8 kg (160 lb 6.4 oz)   06/27/25 72.8 kg (160 lb 9.6 oz)   06/25/25 72.6 kg (160 lb)   06/17/25 73.5 kg (162 lb)   05/29/25 73.5 kg (162 lb)   04/17/25 75.2 kg (165 lb 12.8 oz)   02/13/25 74.8 kg (165 lb)   02/06/25 74.8 kg (165 lb)   09/03/24 73 kg (161 lb)     IBW:  166#  UBW:  160-165#    Diagnoses:  Prostate cancer with RT and ADT  Significant Medical History:   has a past medical history of Back problem, Benign prostatic hypertrophy, Degenerative joint disease (DJD) of lumbar spine, Hearing impairment, High blood pressure, Neuropathy of both feet, Osteoarthritis, Skin cancer of nose (11/2016), and Visual impairment.   Diet History:  general  Oral Liquid Supplement Use:  none    Appetite: good    Nutritional Physical History:   well nourished and thin per visual exam  GI Problems:  GI intact  Adequacy of PO Intake:  good    Nutrition Risk Status:  nutrition status intact    Nutritional Goals:  aid in management of treatment side effects via dietary measures  Handouts Provided:  Basic diet handout provided (limit nuts, seeds, popcorn, corn and avoid gaseous foods). Contact information for questions provided.     Weekly Visits:  Nutritional Interventions:  7/23/2025  Discussed importance of good oral intake for wt maint during tx.  Urged adequate fluids. Balance rest and activity. Advised avoidance of nuts, seeds, popcorn and highly gaseous foods. Advised more restrictions if diarrhea presents. No plans to see further unless requested or pt having significant side effects that I can be of aid.  Contact information provided for questions.       Natalie Golden RD, LDN   Clinical Dietitian  [phone]

## 2025-07-24 PROCEDURE — 77385 HC IMRT SIMPLE: CPT | Performed by: RADIOLOGY

## 2025-07-25 PROCEDURE — 77385 HC IMRT SIMPLE: CPT | Performed by: RADIOLOGY

## 2025-07-25 PROCEDURE — 77336 RADIATION PHYSICS CONSULT: CPT | Performed by: RADIOLOGY

## 2025-07-25 NOTE — PROGRESS NOTES
[facility] Radiation Treatment Management Note 6-10    Patient:  Flash New  Age:  75 year old  Visit Diagnosis:    1. Malignant neoplasm of prostate (HCC)      Primary Rad/Onc:  Dr. Chaim Taylor    Site Delivered Dose (cGy) Prescribed Dose (cGy) Fraction #   Prostate + LN  1440 4500 8/25     First treatment date:  7/17/25  Concurrent chemotherapy: N/A        7/21/2025     9:44 AM 7/23/2025     9:56 AM 7/28/2025     9:45 AM   Oncology Vitals   Weight 160 lb 6.4 oz 160 lb 9.6 oz 161 lb 3.2 oz   Weight 72.757 kg 72.848 kg 73.12 kg   BSA (m2) 1.9 m2 1.9 m2 1.9 m2   BMI 23.02 kg/m2 23.04 kg/m2 23.13 kg/m2   /61  106/65   Pulse 65  65   Resp 18  18   Temp 98 °F (36.7 °C)  97.4 °F (36.3 °C)   SpO2 97 %  95 %   Pain Score 0  0        Toxicities:  Fatigue Grade 0= None  Constipation Grade 0= None  Diarrhea  Grade 0= None- looser, denies diarrhea. Was having 3/day. Started reducing fruits and veggies and adding bread per dietician. Will get imodium.    Dysuria on urination Grade 0= None   Urgency on urination Grade 0= None  Frequency on urinationGrade 1= Present  Urine stream strength Moderate  Urine Incontinence Grade 0= None  Nocturia Grade 1= Present X 2 nightly    Nursing Note:  Patient seen for OTV with Dr. Taylor.   Looser stools, not diarrhea. Will purchase imodium if needed. Modified diet which has helped a bit.   Buttocks soreness, not taking OTC. Denies redness or open areas. Will apply cream if needed.   Having area of BCC on anterior neck removed on Thursday by dermatologist.   Appetite good  Denies hot flashes recently.     Wt Readings from Last 6 Encounters:   07/28/25 73.1 kg (161 lb 3.2 oz)   07/23/25 72.8 kg (160 lb 9.6 oz)   07/21/25 72.8 kg (160 lb 6.4 oz)   06/27/25 72.8 kg (160 lb 9.6 oz)   06/25/25 72.6 kg (160 lb)   06/17/25 73.5 kg (162 lb)     Elise MCKEON, RN    Physician Note:  Subjective:  -LBM, taking imodium PRN  -already on Flomax      Objective:  Vitals noted  ECOG  0  NAD      Treatment setup imaging have been reviewed:  Yes    Assessment/Plan:  DIAGNOSIS :  High risk prostate cancer, G10, MRI pending, PSA 4.7,  for trimodality with HDR/EBRT.  -cont EBRT per plan, HDR boost at [facility]    -LBM, taking imodium PRN    We discussed expected future toxicity. All questions answered.  Next OTV 1 week

## 2025-07-28 ENCOUNTER — OFFICE VISIT (OUTPATIENT)
Dept: RADIATION ONCOLOGY | Facility: HOSPITAL | Age: 75
End: 2025-07-28
Attending: RADIOLOGY
Payer: MEDICARE

## 2025-07-28 VITALS
DIASTOLIC BLOOD PRESSURE: 65 MMHG | SYSTOLIC BLOOD PRESSURE: 106 MMHG | TEMPERATURE: 97 F | WEIGHT: 161.19 LBS | BODY MASS INDEX: 23 KG/M2 | HEART RATE: 65 BPM | RESPIRATION RATE: 18 BRPM | OXYGEN SATURATION: 95 %

## 2025-07-28 DIAGNOSIS — C61 MALIGNANT NEOPLASM OF PROSTATE (HCC): Primary | ICD-10-CM

## 2025-07-28 PROCEDURE — 77385 HC IMRT SIMPLE: CPT | Performed by: RADIOLOGY

## 2025-07-29 PROCEDURE — 77385 HC IMRT SIMPLE: CPT | Performed by: RADIOLOGY

## 2025-07-30 PROCEDURE — 77385 HC IMRT SIMPLE: CPT | Performed by: RADIOLOGY

## 2025-07-31 ENCOUNTER — APPOINTMENT (OUTPATIENT)
Dept: URBAN - METROPOLITAN AREA CLINIC 244 | Age: 75
Setting detail: DERMATOLOGY
End: 2025-07-31

## 2025-07-31 PROBLEM — C44.519 BASAL CELL CARCINOMA OF SKIN OF OTHER PART OF TRUNK: Status: ACTIVE | Noted: 2025-07-31

## 2025-07-31 PROCEDURE — OTHER EXCISION: OTHER

## 2025-07-31 PROCEDURE — 11602 EXC TR-EXT MAL+MARG 1.1-2 CM: CPT

## 2025-07-31 PROCEDURE — 77385 HC IMRT SIMPLE: CPT | Performed by: RADIOLOGY

## 2025-07-31 PROCEDURE — 12032 INTMD RPR S/A/T/EXT 2.6-7.5: CPT

## 2025-08-01 ENCOUNTER — APPOINTMENT (OUTPATIENT)
Dept: RADIATION ONCOLOGY | Facility: HOSPITAL | Age: 75
End: 2025-08-01
Attending: RADIOLOGY

## 2025-08-01 PROCEDURE — 77385 HC IMRT SIMPLE: CPT | Performed by: RADIOLOGY

## 2025-08-01 PROCEDURE — 77336 RADIATION PHYSICS CONSULT: CPT | Performed by: RADIOLOGY

## 2025-08-04 ENCOUNTER — OFFICE VISIT (OUTPATIENT)
Dept: RADIATION ONCOLOGY | Facility: HOSPITAL | Age: 75
End: 2025-08-04
Attending: RADIOLOGY

## 2025-08-04 VITALS
OXYGEN SATURATION: 96 % | SYSTOLIC BLOOD PRESSURE: 109 MMHG | TEMPERATURE: 98 F | HEART RATE: 97 BPM | DIASTOLIC BLOOD PRESSURE: 69 MMHG | RESPIRATION RATE: 18 BRPM

## 2025-08-04 DIAGNOSIS — C61 MALIGNANT NEOPLASM OF PROSTATE (HCC): Primary | ICD-10-CM

## 2025-08-04 PROCEDURE — 77385 HC IMRT SIMPLE: CPT | Performed by: RADIOLOGY

## 2025-08-05 PROCEDURE — 77385 HC IMRT SIMPLE: CPT | Performed by: RADIOLOGY

## 2025-08-06 PROCEDURE — 77385 HC IMRT SIMPLE: CPT | Performed by: RADIOLOGY

## 2025-08-07 PROCEDURE — 77385 HC IMRT SIMPLE: CPT | Performed by: RADIOLOGY

## 2025-08-08 PROCEDURE — 77336 RADIATION PHYSICS CONSULT: CPT | Performed by: RADIOLOGY

## 2025-08-08 PROCEDURE — 77385 HC IMRT SIMPLE: CPT | Performed by: RADIOLOGY

## 2025-08-10 ENCOUNTER — PATIENT MESSAGE (OUTPATIENT)
Dept: INTERNAL MEDICINE CLINIC | Facility: CLINIC | Age: 75
End: 2025-08-10

## 2025-08-11 ENCOUNTER — OFFICE VISIT (OUTPATIENT)
Dept: RADIATION ONCOLOGY | Facility: HOSPITAL | Age: 75
End: 2025-08-11
Attending: RADIOLOGY

## 2025-08-11 VITALS
RESPIRATION RATE: 16 BRPM | BODY MASS INDEX: 23 KG/M2 | DIASTOLIC BLOOD PRESSURE: 62 MMHG | WEIGHT: 161.19 LBS | TEMPERATURE: 97 F | SYSTOLIC BLOOD PRESSURE: 98 MMHG | OXYGEN SATURATION: 96 % | HEART RATE: 68 BPM

## 2025-08-11 DIAGNOSIS — C61 MALIGNANT NEOPLASM OF PROSTATE (HCC): Primary | ICD-10-CM

## 2025-08-11 PROCEDURE — 77385 HC IMRT SIMPLE: CPT | Performed by: RADIOLOGY

## 2025-08-12 ENCOUNTER — LAB ENCOUNTER (OUTPATIENT)
Dept: LAB | Facility: HOSPITAL | Age: 75
End: 2025-08-12
Attending: FAMILY MEDICINE

## 2025-08-12 DIAGNOSIS — Z01.818 PRE-OP EVALUATION: ICD-10-CM

## 2025-08-12 LAB
ALBUMIN SERPL-MCNC: 4.4 G/DL (ref 3.2–4.8)
ALBUMIN/GLOB SERPL: 1.8 (ref 1–2)
ALP LIVER SERPL-CCNC: 84 U/L (ref 45–117)
ALT SERPL-CCNC: 11 U/L (ref 10–49)
ANION GAP SERPL CALC-SCNC: 8 MMOL/L (ref 0–18)
AST SERPL-CCNC: 17 U/L (ref ?–34)
BASOPHILS # BLD AUTO: 0.03 X10(3) UL (ref 0–0.2)
BASOPHILS NFR BLD AUTO: 0.3 %
BILIRUB SERPL-MCNC: 1.3 MG/DL (ref 0.2–1.1)
BUN BLD-MCNC: 14 MG/DL (ref 9–23)
BUN/CREAT SERPL: 15.7 (ref 10–20)
CALCIUM BLD-MCNC: 9.3 MG/DL (ref 8.7–10.4)
CHLORIDE SERPL-SCNC: 100 MMOL/L (ref 98–112)
CO2 SERPL-SCNC: 29 MMOL/L (ref 21–32)
CREAT BLD-MCNC: 0.89 MG/DL (ref 0.7–1.3)
DEPRECATED RDW RBC AUTO: 41.6 FL (ref 35.1–46.3)
EGFRCR SERPLBLD CKD-EPI 2021: 89 ML/MIN/1.73M2 (ref 60–?)
EOSINOPHIL # BLD AUTO: 0.22 X10(3) UL (ref 0–0.7)
EOSINOPHIL NFR BLD AUTO: 2.5 %
ERYTHROCYTE [DISTWIDTH] IN BLOOD BY AUTOMATED COUNT: 12.6 % (ref 11–15)
FASTING STATUS PATIENT QL REPORTED: NO
GLOBULIN PLAS-MCNC: 2.5 G/DL (ref 2–3.5)
GLUCOSE BLD-MCNC: 82 MG/DL (ref 70–99)
HCT VFR BLD AUTO: 40.3 % (ref 39–53)
HGB BLD-MCNC: 13.9 G/DL (ref 13–17.5)
IMM GRANULOCYTES # BLD AUTO: 0.02 X10(3) UL (ref 0–1)
IMM GRANULOCYTES NFR BLD: 0.2 %
LYMPHOCYTES # BLD AUTO: 0.54 X10(3) UL (ref 1–4)
LYMPHOCYTES NFR BLD AUTO: 6.1 %
MCH RBC QN AUTO: 31.3 PG (ref 26–34)
MCHC RBC AUTO-ENTMCNC: 34.5 G/DL (ref 31–37)
MCV RBC AUTO: 90.8 FL (ref 80–100)
MONOCYTES # BLD AUTO: 0.69 X10(3) UL (ref 0.1–1)
MONOCYTES NFR BLD AUTO: 7.8 %
NEUTROPHILS # BLD AUTO: 7.32 X10 (3) UL (ref 1.5–7.7)
NEUTROPHILS # BLD AUTO: 7.32 X10(3) UL (ref 1.5–7.7)
NEUTROPHILS NFR BLD AUTO: 83.1 %
OSMOLALITY SERPL CALC.SUM OF ELEC: 284 MOSM/KG (ref 275–295)
PLATELET # BLD AUTO: 161 10(3)UL (ref 150–450)
POTASSIUM SERPL-SCNC: 3.2 MMOL/L (ref 3.5–5.1)
PROT SERPL-MCNC: 6.9 G/DL (ref 5.7–8.2)
RBC # BLD AUTO: 4.44 X10(6)UL (ref 3.8–5.8)
SODIUM SERPL-SCNC: 137 MMOL/L (ref 136–145)
WBC # BLD AUTO: 8.8 X10(3) UL (ref 4–11)

## 2025-08-12 PROCEDURE — 36415 COLL VENOUS BLD VENIPUNCTURE: CPT

## 2025-08-12 PROCEDURE — 85025 COMPLETE CBC W/AUTO DIFF WBC: CPT

## 2025-08-12 PROCEDURE — 77385 HC IMRT SIMPLE: CPT | Performed by: RADIOLOGY

## 2025-08-12 PROCEDURE — 80053 COMPREHEN METABOLIC PANEL: CPT

## 2025-08-13 ENCOUNTER — OFFICE VISIT (OUTPATIENT)
Dept: INTERNAL MEDICINE CLINIC | Facility: CLINIC | Age: 75
End: 2025-08-13

## 2025-08-13 VITALS
OXYGEN SATURATION: 97 % | BODY MASS INDEX: 23 KG/M2 | DIASTOLIC BLOOD PRESSURE: 56 MMHG | HEART RATE: 63 BPM | SYSTOLIC BLOOD PRESSURE: 110 MMHG | WEIGHT: 162.81 LBS

## 2025-08-13 DIAGNOSIS — E87.6 HYPOKALEMIA: ICD-10-CM

## 2025-08-13 DIAGNOSIS — Z01.818 PRE-OP EVALUATION: Primary | ICD-10-CM

## 2025-08-13 DIAGNOSIS — I10 ESSENTIAL HYPERTENSION: ICD-10-CM

## 2025-08-13 PROCEDURE — 77385 HC IMRT SIMPLE: CPT | Performed by: RADIOLOGY

## 2025-08-13 PROCEDURE — 99214 OFFICE O/P EST MOD 30 MIN: CPT | Performed by: FAMILY MEDICINE

## 2025-08-13 PROCEDURE — G2211 COMPLEX E/M VISIT ADD ON: HCPCS | Performed by: FAMILY MEDICINE

## 2025-08-13 RX ORDER — POTASSIUM CHLORIDE 1500 MG/1
20 TABLET, EXTENDED RELEASE ORAL DAILY
Qty: 30 TABLET | Refills: 0 | Status: SHIPPED | OUTPATIENT
Start: 2025-08-13 | End: 2025-09-12

## 2025-08-14 ENCOUNTER — APPOINTMENT (OUTPATIENT)
Dept: URBAN - METROPOLITAN AREA CLINIC 244 | Age: 75
Setting detail: DERMATOLOGY
End: 2025-08-14

## 2025-08-14 DIAGNOSIS — Z48.02 ENCOUNTER FOR REMOVAL OF SUTURES: ICD-10-CM

## 2025-08-14 PROCEDURE — 99024 POSTOP FOLLOW-UP VISIT: CPT

## 2025-08-14 PROCEDURE — 77385 HC IMRT SIMPLE: CPT | Performed by: RADIOLOGY

## 2025-08-14 PROCEDURE — OTHER PHOTO-DOCUMENTATION: OTHER

## 2025-08-14 PROCEDURE — OTHER SUTURE REMOVAL (GLOBAL PERIOD): OTHER

## 2025-08-14 ASSESSMENT — LOCATION DETAILED DESCRIPTION DERM
LOCATION DETAILED: STERNAL NOTCH
LOCATION DETAILED: STERNAL NOTCH

## 2025-08-14 ASSESSMENT — LOCATION SIMPLE DESCRIPTION DERM
LOCATION SIMPLE: CHEST
LOCATION SIMPLE: CHEST

## 2025-08-14 ASSESSMENT — LOCATION ZONE DERM
LOCATION ZONE: TRUNK
LOCATION ZONE: TRUNK

## 2025-08-15 PROCEDURE — 77336 RADIATION PHYSICS CONSULT: CPT | Performed by: RADIOLOGY

## 2025-08-15 PROCEDURE — 77385 HC IMRT SIMPLE: CPT | Performed by: RADIOLOGY

## 2025-08-18 ENCOUNTER — OFFICE VISIT (OUTPATIENT)
Dept: RADIATION ONCOLOGY | Facility: HOSPITAL | Age: 75
End: 2025-08-18
Attending: RADIOLOGY

## 2025-08-18 VITALS
BODY MASS INDEX: 23 KG/M2 | OXYGEN SATURATION: 98 % | TEMPERATURE: 98 F | WEIGHT: 159.63 LBS | SYSTOLIC BLOOD PRESSURE: 120 MMHG | RESPIRATION RATE: 16 BRPM | DIASTOLIC BLOOD PRESSURE: 64 MMHG | HEART RATE: 60 BPM

## 2025-08-18 DIAGNOSIS — C61 MALIGNANT NEOPLASM OF PROSTATE (HCC): Primary | ICD-10-CM

## 2025-08-18 PROCEDURE — 77385 HC IMRT SIMPLE: CPT | Performed by: RADIOLOGY

## 2025-08-19 PROCEDURE — 77385 HC IMRT SIMPLE: CPT | Performed by: RADIOLOGY

## 2025-08-20 PROCEDURE — 77336 RADIATION PHYSICS CONSULT: CPT | Performed by: RADIOLOGY

## 2025-08-20 PROCEDURE — 77385 HC IMRT SIMPLE: CPT | Performed by: RADIOLOGY

## 2025-08-21 ENCOUNTER — LAB ENCOUNTER (OUTPATIENT)
Dept: LAB | Facility: HOSPITAL | Age: 75
End: 2025-08-21
Attending: FAMILY MEDICINE

## 2025-08-21 DIAGNOSIS — E87.6 HYPOKALEMIA: ICD-10-CM

## 2025-08-21 LAB
ANION GAP SERPL CALC-SCNC: 9 MMOL/L (ref 0–18)
BUN BLD-MCNC: 16 MG/DL (ref 9–23)
BUN/CREAT SERPL: 18.4 (ref 10–20)
CALCIUM BLD-MCNC: 9.3 MG/DL (ref 8.7–10.4)
CHLORIDE SERPL-SCNC: 104 MMOL/L (ref 98–112)
CO2 SERPL-SCNC: 27 MMOL/L (ref 21–32)
CREAT BLD-MCNC: 0.87 MG/DL (ref 0.7–1.3)
EGFRCR SERPLBLD CKD-EPI 2021: 90 ML/MIN/1.73M2 (ref 60–?)
FASTING STATUS PATIENT QL REPORTED: NO
GLUCOSE BLD-MCNC: 85 MG/DL (ref 70–99)
OSMOLALITY SERPL CALC.SUM OF ELEC: 290 MOSM/KG (ref 275–295)
POTASSIUM SERPL-SCNC: 3.6 MMOL/L (ref 3.5–5.1)
SODIUM SERPL-SCNC: 140 MMOL/L (ref 136–145)

## 2025-08-21 PROCEDURE — 80048 BASIC METABOLIC PNL TOTAL CA: CPT

## 2025-08-21 PROCEDURE — 36415 COLL VENOUS BLD VENIPUNCTURE: CPT

## 2025-08-24 DIAGNOSIS — E87.6 HYPOKALEMIA: ICD-10-CM

## 2025-08-27 RX ORDER — POTASSIUM CHLORIDE 1500 MG/1
1 TABLET, EXTENDED RELEASE ORAL DAILY
Qty: 30 TABLET | Refills: 0 | OUTPATIENT
Start: 2025-08-27

## (undated) DEVICE — SUT VICRYL 0 CP-1 J267H

## (undated) DEVICE — KIT ENDO ORCAPOD 160/180/190

## (undated) DEVICE — DRAPE SHEET LARGE 76X55

## (undated) DEVICE — 3M™ STERI-STRIP™ REINFORCED ADHESIVE SKIN CLOSURES, R1547, 1/2 IN X 4 IN (12 MM X 100 MM), 6 STRIPS/ENVELOPE: Brand: 3M™ STERI-STRIP™

## (undated) DEVICE — SOLUTION  .9 3000ML

## (undated) DEVICE — 450 ML BOTTLE OF 0.05% CHLORHEXIDINE GLUCONATE IN 99.95% STERILE WATER FOR IRRIGATION, USP AND APPLICATOR.: Brand: IRRISEPT ANTIMICROBIAL WOUND LAVAGE

## (undated) DEVICE — SPLINT ORTH UNV HIP PD CUP LG

## (undated) DEVICE — RENTAL LASER GREEN LIGHT XPS

## (undated) DEVICE — GAMMEX® PI HYBRID SIZE 6.5, STERILE POWDER-FREE SURGICAL GLOVE, POLYISOPRENE AND NEOPRENE BLEND: Brand: GAMMEX

## (undated) DEVICE — NON-ADHERENT PAD PREPACK: Brand: TELFA

## (undated) DEVICE — TOWEL SURG OR 17X30IN BLUE

## (undated) DEVICE — Device

## (undated) DEVICE — FIBER XPS MOXY

## (undated) DEVICE — UROLOGY DRAIN BAG

## (undated) DEVICE — BLADE 20 SHRP BP SS SRG STRL

## (undated) DEVICE — DRESSING BIOPATCH 1X4 BLUE

## (undated) DEVICE — TRAY SURESTEP 16 BARDEX DRAIN

## (undated) DEVICE — 3M™ STERI-DRAPE™ U-DRAPE 1015: Brand: STERI-DRAPE™

## (undated) DEVICE — TUR/ENDOSCOPIC CABLE, 10' (3.05 M): Brand: CONMED

## (undated) DEVICE — SKIN PREP TRAY 4 COMPARTM TRAY: Brand: MEDLINE INDUSTRIES, INC.

## (undated) DEVICE — GAMMEX® NON-LATEX PI ORTHO SIZE 9, STERILE POLYISOPRENE POWDER-FREE SURGICAL GLOVE: Brand: GAMMEX

## (undated) DEVICE — BLADE SAW THK.64MM THK.99MM 70

## (undated) DEVICE — HOOD: Brand: FLYTE

## (undated) DEVICE — TOTAL HIP: Brand: MEDLINE INDUSTRIES, INC.

## (undated) DEVICE — KIT DRN 3/16IN PVC DRN 3 SPRG

## (undated) DEVICE — MEDI-VAC NON-CONDUCTIVE SUCTION TUBING 6MM X 1.8M (6FT.) L: Brand: CARDINAL HEALTH

## (undated) DEVICE — REM POLYHESIVE ADULT PATIENT RETURN ELECTRODE: Brand: VALLEYLAB

## (undated) DEVICE — BIT DRL 30MM 3.2MM RNGLC ACTB

## (undated) DEVICE — 3M™ STERI-DRAPE™ INCISE DRAPE 1050 (60CM X 45CM): Brand: STERI-DRAPE™

## (undated) DEVICE — MEDI-VAC NON-CONDUCTIVE SUCTION TUBING: Brand: CARDINAL HEALTH

## (undated) DEVICE — SOL NACL IRRIG 0.9% 1000ML BTL

## (undated) DEVICE — SHEET,DRAPE,70X100,STERILE: Brand: MEDLINE

## (undated) DEVICE — 3 ML SYRINGE LUER-LOCK TIP: Brand: MONOJECT

## (undated) DEVICE — 3M™ TEGADERM™ TRANSPARENT FILM DRESSING, 1626W, 4 IN X 4-3/4 IN (10 CM X 12 CM), 50 EACH/CARTON, 4 CARTON/CASE: Brand: 3M™ TEGADERM™

## (undated) DEVICE — GAMMEX® PI HYBRID SIZE 7, STERILE POWDER-FREE SURGICAL GLOVE, POLYISOPRENE AND NEOPRENE BLEND: Brand: GAMMEX

## (undated) DEVICE — STERILE LATEX POWDER-FREE SURGICAL GLOVESWITH NITRILE COATING: Brand: PROTEXIS

## (undated) DEVICE — 3M™ STERI-DRAPE™ PATIENT ISOLATION DRAPE 1014: Brand: STERI-DRAPE™

## (undated) DEVICE — SUT PDS II 1 CT-1 Z347H

## (undated) DEVICE — BAG DRAIN INFECTION CNTRL 2000

## (undated) DEVICE — LUBRICANT JLY SURGILUBE 2OZ

## (undated) DEVICE — CATH URTH LBRCTH 22FR LNG

## (undated) DEVICE — SOLUTION SURG DURAPREP 26ML

## (undated) DEVICE — KIT CLEAN ENDOKIT 1.1OZ GOWNX2

## (undated) DEVICE — SYS THR OSSEOTI CER LINER STEM: Type: IMPLANTABLE DEVICE

## (undated) DEVICE — SOL H2O 1000ML BTL

## (undated) DEVICE — 3M™ STERI-STRIP™ COMPOUND BENZOIN TINCTURE 40 BAGS/CARTON 4 CARTONS/CASE C1544: Brand: 3M™ STERI-STRIP™

## (undated) DEVICE — SUT MONOCRYL 3-0 PS-1 Y936H

## (undated) DEVICE — CATH SECURING DEVICE STATLOCK

## (undated) DEVICE — 3M™ MEDITPORE™ SOFT CLOTH TAPE 6 IN X 10 YD 12 ROLLS/CASE 2966: Brand: 3M™ MEDIPORE™

## (undated) DEVICE — PROXIMATE SKIN STAPLERS (35 WIDE) CONTAINS 35 STAINLESS STEEL STAPLES (FIXED HEAD): Brand: PROXIMATE

## (undated) DEVICE — LINE MNTR ADLT SET O2 INTMD

## (undated) DEVICE — GAMMEX® PI HYBRID SIZE 9, STERILE POWDER-FREE SURGICAL GLOVE, POLYISOPRENE AND NEOPRENE BLEND: Brand: GAMMEX

## (undated) DEVICE — SUCTION CANISTER, 3000CC,SAFELINER: Brand: DEROYAL

## (undated) DEVICE — 35 ML SYRINGE REGULAR TIP: Brand: MONOJECT

## (undated) DEVICE — SEALER CAP SELF-SEAL 1.6MM

## (undated) DEVICE — Device: Brand: STABLECUT®

## (undated) DEVICE — MEGADYNE 6.5IN BLADE MONO

## (undated) DEVICE — CYSTO PACK: Brand: MEDLINE INDUSTRIES, INC.

## (undated) DEVICE — SOL H2O 3000ML IRRIG

## (undated) DEVICE — GAUZE TRAY STERILE 4X4 12PLY

## (undated) NOTE — MR AVS SNAPSHOT
Kim  [address]  [phone]               Thank you for choosing us for your health care visit with Gay Armstrong.   We are glad to serve you and happy to provide you with this lira Commonly known as:  VITAMIN B12                   MyChart     Visit [facility]  You can access your MyChart to more actively manage your health care and view more details from this visit by going to [email or web address].   If you've recently had a stay

## (undated) NOTE — LETTER
[address] [facility] [address] [place], [place]      Authorization for Surgical Operation and Procedure     Date:___________                                                                                                         Time:__________  1. I hereby authorizeTacos Doyle MD, my physician and his/her assistants (if applicable), which may include medical students, residents, and/or fellows, to perform the following surgical operation/ procedure and administer such anesthesia as may be determined necessary by my physician:  Operation/Procedure name (s) COLONOSCOPY  on Myla Race   2. I recognize that during the surgical operation/procedure, unforeseen conditions may necessitate additional or different procedures than those listed above. I, therefore, further authorize and request that the above-named surgeon, assistants, or designees perform such procedures as are, in their judgment, necessary and desirable. 3.   My surgeon/physician has discussed prior to my surgery the potential benefits, risks and side effects of this procedure; the likelihood of achieving goals; and potential problems that might occur during recuperation. They also discussed reasonable alternatives to the procedure, including risks, benefits, and side effects related to the alternatives and risks related to not receiving this procedure. I have had all my questions answered and I acknowledge that no guarantee has been made as to the result that may be obtained. 4.   Should the need arise during my operation or immediate post-operative period, I also consent to the administration of blood and/or blood products. Further, I understand that despite careful testing and screening of blood or blood products by collecting agencies, I may still be subject to ill effects as a result of receiving a blood transfusion and/or blood products.   The following are some, but not all, of the potential risks that can occur: fever and allergic reactions, hemolytic reactions, transmission of diseases such as Hepatitis, AIDS and Cytomegalovirus (CMV) and fluid overload. In the event that I wish to have an autologous transfusion of my own blood, or a directed donor transfusion. I will discuss this with my physician. 5.   I authorize the use of any specimen, organs, tissues, body parts or foreign objects that may be removed from my body during the operation/procedure for diagnosis, research or teaching purposes and their subsequent disposal by hospital authorities. I also authorize the release of specimen test results and/or written reports to my treating physician on the hospital medical staff or other referring or consulting physicians involved in my care, at the discretion of the Pathologist or my treating physician. 6.   I consent to the photographing or videotaping of the operations or procedures to be performed, including appropriate portions of my body for medical, scientific, or educational purposes, provided my identity is not revealed by the pictures or by descriptive texts accompanying them. If the procedure has been photographed/videotaped, the surgeon will obtain the original picture, image, videotape or CD. The hospital will not be responsible for storage, release or maintenance of the picture, image, tape or CD.    7.   I consent to the presence of a  or observers in the operating room as deemed necessary by my physician or their designees. 8.   I recognize that in the event my procedure results in extended X-Ray/fluoroscopy time, I may develop a skin reaction. 9. If I have a Do Not Attempt Resuscitation (DNAR) order in place, that status will be suspended while in the operating room, procedural suite, and during the recovery period unless otherwise explicitly stated by me (or a person authorized to consent on my behalf).  The surgeon or my attending physician will determine when the applicable recovery period ends for purposes of reinstating the DNAR order. 10. Patients having a sterilization procedure: I understand that if the procedure is successful the results will be permanent and it will therefore be impossible for me to inseminate, conceive, or bear children. I also understand that the procedure is intended to result in sterility, although the result has not been guaranteed. 11. I acknowledge that my physician has explained sedation/analgesia administration to me including the risk and benefits I consent to the administration of sedation/analgesia as may be necessary or desirable in the judgment of my physician. I CERTIFY THAT I HAVE READ AND FULLY UNDERSTAND THE ABOVE CONSENT TO OPERATION and/or OTHER PROCEDURE.    _________________________________________  __________________________________  Signature of Patient     Signature of Responsible Person         ___________________________________         Printed Name of Responsible Person           _________________________________                  Relationship to Patient  _________________________________________  ______________________________  Signature of Witness          Date  Time    STATEMENT OF PHYSICIAN My signature below affirms that prior to the time of the procedure; I have explained to the patient and/or his/her legal representative, the risks and benefits involved in the proposed treatment and any reasonable alternative to the proposed treatment. I have also explained the risks and benefits involved in refusal of the proposed treatment and alternatives to the proposed treatment and have answered the patient's questions. If I have a significant financial interest in a co-management agreement or a significant financial interest in any product or implant, or other significant relationship used in this procedure/surgery, I have disclosed this and had a discussion with my patient. _______________________________________________________________ _____________________________  Nathanael Mo)                                                                                         (Date)                                   (Time)        Patient Name: Sen Breana    : 1950   Printed: 3/14/2022      Medical Record #: [id number]                                              Page 1

## (undated) NOTE — MR AVS SNAPSHOT
[address] at [address] [id number]  [phone]               Thank you for choosing us for your health care visit with Garrett Gaines MD.  We are glad to serve you and happy to provide you with 138/88 mmHg 63 97.8 °F (36.6 °C) 170 lb           Current Medications          This list is accurate as of: 5/3/17 10:59 AM.  Always use your most recent med list.                ASPIR-81 81 MG Tbec   Generic drug:  aspirin   Take  by mouth.            [facility] office, you can view your past visit information in [employer] by going to Visits < Visit Summaries. Web and Rank questions? Call [phone] for help. Web and Rank is NOT to be used for urgent needs. For medical emergencies, dial 911.         Educational Inform

## (undated) NOTE — LETTER
No referring provider defined for this encounter. 05/13/20        Patient: Karley Boo   YOB: 1950   Date of Visit: 5/13/2020       Dear  Dr. Carlo Lozoya MD,      Thank you for referring Karley Boo to my practice.   Please fi

## (undated) NOTE — Clinical Note
TCM call completed. A TCM-[facility] appointment is scheduled for 1/30/2023. The patient reports pain has been well managed at home. Thank you.

## (undated) NOTE — LETTER
Hospital Discharge Documentation  Please phone to schedule a hospital follow up appointment.     From: [address] Hospitalist's Office  Phone: [phone]    Patient discharged time/date: 4/8/2018  2:15 PM  Patient discharge disposition:  Home or Self Leukocytosis, unspecified type      Reason for Admission:[RS.1] Syncope[RS.2]    Physical Exam:[RS.1]    04/08/18  0757   BP: 154/84   Pulse: 65   Resp: 18   Temp: 97.7 °F (36.5 °C)[RS.3]   General: No acute distress. Alert and oriented x 3.   HEENT: Richard Persisting, will give IV and PO replacement prior to DC  Will send home on 3 days of 20 meq BID  Monitor K as OP      BPH  s/p GreenLight laser vaporization of prostate  Complete abx course  Pain controlled  Cont PRN pyridium for dysuria[RS.2]    Consultat Take by mouth daily. Refills:  0     Glucosamine-Chondroitin 500-400 MG Caps      Take by mouth daily.    Refills:  0     HYDROcodone-acetaminophen 5-325 MG Tabs  Commonly known as:  NORCO      Take 1 tablet by mouth every 6 (six) hours as needed for [employer]

## (undated) NOTE — LETTER
[facility]  Administration of Anesthesia  1. Lyles Mom, or _________________________________ acting on his behalf, (Patient) (Dependent/Representative) request to receive anesthesia for my pending procedure/operation/treatment. A physician (anesthesiologist) alone or an anesthesiologist working with a nurse anesthetist may administer my anesthesia. 2. I understand that my anesthesiologist is not an employee or agent of the hospital, but is an independent medical practitioner who has been permitted to use its facilities for the care and treatment of his/her patients. 3. I acknowledge that a physician from [facility] or their designate(s), recommended anesthesia for me using her/his medical judgment. The type(s) of anesthesia I may receive include:                a) General Anesthesia, b) Spinal/Epidural Anesthesia, c) Regional Anesthesia or d) Monitored Anesthesia Care. 4. If my spinal, regional or monitored anesthesia care (local) is not satisfactory for my comfort, or if my medical condition requires, I consent to the administration of general anesthesia. 5. I am aware that the practice of anesthesiology is not an exact science and that some foreseeable risks or consequences may occur. Some common risks/consequences include sore throat and hoarseness, nausea and vomiting, muscle soreness, backache, damage to the mouth/teeth/vocal cords and eye injury. I understand that more rare but serious potential risks of anesthesia include blood pressure changes, drug reactions, cardiac arrest, brain damage, paralysis or death. These risks apply to whether I have general, spinal/epidural, regional or monitored anesthesia care. 6. OBSTETRIC PATIENTS: Specific risks/consequences of spinal/epidural anesthesia may include itching, low blood pressure, difficulty urinating, slowing of the baby's heart rate and headache.  Rare risks include infections, high spinal block, spinal bleeding, seizure, cardiac arrest and death. 7. AWARENESS: I understand that it is possible (but unlikely) to have explicit memory of events from the operating room while under general anesthesia. 8. ELECTROCONVULSIVE THERAPY PATIENTS: This consent serves for all treatments in a single course of therapy. 9. I understand that I must inform my anesthesiologist when I last ate and/or drank to minimize the risk of anesthesia. 10. If I am pregnant, or may pregnant, I understand that elective surgery should be postponed until after the baby is born. Anesthetics cross the placenta and may temporarily anesthetize the baby. Although fetal complications of anesthesia during pregnancy are rare, they may include birth defects, premature labor, brain damage and death. 11. I certify that I informed the anesthesiologist, to the best of my ability, about medication I take including blood thinners, anticoagulants, herbal remedies, narcotics and recreational drugs (e.g. cocaine, marijuana, PCP). Failure to inform my anesthesiologist about these medicines may increase my risk of anesthetic complications. The nature and purpose of my anesthetic management was explained to me. I had the opportunity to ask questions and the answers and information provided meet my satisfaction.   I retain the right to withdraw this consent at any time prior to the administration of said anesthetic.    ___________________________________________________           _____________________________________________________  Patient Signature                                                                                      Witness Signature                ___________________________________________________           _____________________________________________________  Date/Time                                                                                               Responsible person in case of minor/ unconscious pt /Relationship    My signature below affirms that prior to the time of the procedure, I have explained to the patient and/or his/her guardian, the risks and benefits of undergoing anesthesia, as well as any reasonable alternatives.     ___________________________________________________            _____________________________________________________  Physician Signature                            Date/Time  Patient Name: Sariah Joya     : 1950     Printed: 3/14/2022      Medical Record #: [id number]                              Page 1 of 1    ----------ANESTHESIA CONSENT----------